# Patient Record
Sex: MALE | Race: WHITE
[De-identification: names, ages, dates, MRNs, and addresses within clinical notes are randomized per-mention and may not be internally consistent; named-entity substitution may affect disease eponyms.]

---

## 2017-08-16 ENCOUNTER — HOSPITAL ENCOUNTER (OUTPATIENT)
Dept: HOSPITAL 57 - BURLABSP | Age: 82
Discharge: HOME | End: 2017-08-16
Attending: FAMILY MEDICINE
Payer: MEDICARE

## 2017-08-16 DIAGNOSIS — L97.512: ICD-10-CM

## 2017-08-16 DIAGNOSIS — E11.621: Primary | ICD-10-CM

## 2017-08-16 PROCEDURE — 87186 SC STD MICRODIL/AGAR DIL: CPT

## 2017-08-16 PROCEDURE — 87205 SMEAR GRAM STAIN: CPT

## 2017-08-16 PROCEDURE — 87077 CULTURE AEROBIC IDENTIFY: CPT

## 2017-08-16 PROCEDURE — 87070 CULTURE OTHR SPECIMN AEROBIC: CPT

## 2017-09-12 ENCOUNTER — HOSPITAL ENCOUNTER (INPATIENT)
Dept: HOSPITAL 57 - BURMED | Age: 82
LOS: 16 days | Discharge: NURSING, REHAB OR LONG-TERM CARE | DRG: 690 | End: 2017-09-28
Attending: FAMILY MEDICINE | Admitting: FAMILY MEDICINE
Payer: MEDICARE

## 2017-09-12 VITALS — BODY MASS INDEX: 40.4 KG/M2

## 2017-09-12 DIAGNOSIS — I25.10: ICD-10-CM

## 2017-09-12 DIAGNOSIS — N39.0: Primary | ICD-10-CM

## 2017-09-12 DIAGNOSIS — I50.32: ICD-10-CM

## 2017-09-12 DIAGNOSIS — D64.9: ICD-10-CM

## 2017-09-12 DIAGNOSIS — E66.01: ICD-10-CM

## 2017-09-12 DIAGNOSIS — K21.9: ICD-10-CM

## 2017-09-12 DIAGNOSIS — L97.519: ICD-10-CM

## 2017-09-12 DIAGNOSIS — I11.0: ICD-10-CM

## 2017-09-12 DIAGNOSIS — G47.33: ICD-10-CM

## 2017-09-12 DIAGNOSIS — Z99.3: ICD-10-CM

## 2017-09-12 DIAGNOSIS — E11.9: ICD-10-CM

## 2017-09-12 DIAGNOSIS — M45.9: ICD-10-CM

## 2017-09-12 DIAGNOSIS — I48.0: ICD-10-CM

## 2017-09-12 DIAGNOSIS — N39.41: ICD-10-CM

## 2017-09-12 PROCEDURE — 97602 WOUND(S) CARE NON-SELECTIVE: CPT

## 2017-09-12 PROCEDURE — 81001 URINALYSIS AUTO W/SCOPE: CPT

## 2017-09-12 PROCEDURE — 36416 COLLJ CAPILLARY BLOOD SPEC: CPT

## 2017-09-12 PROCEDURE — 87086 URINE CULTURE/COLONY COUNT: CPT

## 2017-09-12 RX ADMIN — Medication SCH MG: at 22:21

## 2017-09-13 RX ADMIN — INSULIN DETEMIR SCH UNIT: 100 INJECTION, SOLUTION SUBCUTANEOUS at 10:52

## 2017-09-13 RX ADMIN — METFORMIN HYDROCHLORIDE SCH MG: 500 TABLET, EXTENDED RELEASE ORAL at 11:04

## 2017-09-13 RX ADMIN — INSULIN HUMAN PRN UNIT: 100 INJECTION, SOLUTION PARENTERAL at 18:26

## 2017-09-13 RX ADMIN — ASPIRIN SCH MG: 81 TABLET ORAL at 11:06

## 2017-09-13 RX ADMIN — Medication SCH MG: at 11:03

## 2017-09-13 RX ADMIN — INSULIN HUMAN PRN UNIT: 100 INJECTION, SOLUTION PARENTERAL at 13:08

## 2017-09-13 RX ADMIN — Medication SCH MG: at 20:40

## 2017-09-13 RX ADMIN — METFORMIN HYDROCHLORIDE SCH MG: 500 TABLET, EXTENDED RELEASE ORAL at 18:23

## 2017-09-14 RX ADMIN — Medication SCH MG: at 21:47

## 2017-09-14 RX ADMIN — METFORMIN HYDROCHLORIDE SCH MG: 500 TABLET, EXTENDED RELEASE ORAL at 08:46

## 2017-09-14 RX ADMIN — INSULIN DETEMIR SCH UNIT: 100 INJECTION, SOLUTION SUBCUTANEOUS at 09:07

## 2017-09-14 RX ADMIN — ASPIRIN SCH MG: 81 TABLET ORAL at 09:03

## 2017-09-14 RX ADMIN — METFORMIN HYDROCHLORIDE SCH MG: 500 TABLET, EXTENDED RELEASE ORAL at 21:45

## 2017-09-14 RX ADMIN — Medication SCH MG: at 08:45

## 2017-09-15 RX ADMIN — INSULIN DETEMIR SCH UNIT: 100 INJECTION, SOLUTION SUBCUTANEOUS at 10:45

## 2017-09-15 RX ADMIN — Medication SCH MG: at 10:47

## 2017-09-15 RX ADMIN — METFORMIN HYDROCHLORIDE SCH MG: 500 TABLET, EXTENDED RELEASE ORAL at 10:59

## 2017-09-15 RX ADMIN — Medication SCH MG: at 21:10

## 2017-09-15 RX ADMIN — METFORMIN HYDROCHLORIDE SCH MG: 500 TABLET, EXTENDED RELEASE ORAL at 18:11

## 2017-09-15 RX ADMIN — ASPIRIN SCH MG: 81 TABLET ORAL at 10:45

## 2017-09-16 RX ADMIN — METFORMIN HYDROCHLORIDE SCH MG: 500 TABLET, EXTENDED RELEASE ORAL at 16:33

## 2017-09-16 RX ADMIN — Medication SCH MG: at 21:09

## 2017-09-16 RX ADMIN — INSULIN HUMAN PRN UNIT: 100 INJECTION, SOLUTION PARENTERAL at 12:30

## 2017-09-16 RX ADMIN — METFORMIN HYDROCHLORIDE SCH MG: 500 TABLET, EXTENDED RELEASE ORAL at 08:37

## 2017-09-16 RX ADMIN — INSULIN DETEMIR SCH UNIT: 100 INJECTION, SOLUTION SUBCUTANEOUS at 08:42

## 2017-09-16 RX ADMIN — ASPIRIN SCH MG: 81 TABLET ORAL at 08:37

## 2017-09-16 RX ADMIN — Medication SCH MG: at 08:41

## 2017-09-17 RX ADMIN — ASPIRIN SCH MG: 81 TABLET ORAL at 09:27

## 2017-09-17 RX ADMIN — METFORMIN HYDROCHLORIDE SCH MG: 500 TABLET, EXTENDED RELEASE ORAL at 17:36

## 2017-09-17 RX ADMIN — Medication SCH MG: at 21:03

## 2017-09-17 RX ADMIN — Medication SCH MG: at 09:28

## 2017-09-17 RX ADMIN — METFORMIN HYDROCHLORIDE SCH MG: 500 TABLET, EXTENDED RELEASE ORAL at 09:25

## 2017-09-17 RX ADMIN — INSULIN DETEMIR SCH UNIT: 100 INJECTION, SOLUTION SUBCUTANEOUS at 09:15

## 2017-09-18 RX ADMIN — METFORMIN HYDROCHLORIDE SCH MG: 500 TABLET, EXTENDED RELEASE ORAL at 17:15

## 2017-09-18 RX ADMIN — METFORMIN HYDROCHLORIDE SCH MG: 500 TABLET, EXTENDED RELEASE ORAL at 08:21

## 2017-09-18 RX ADMIN — Medication SCH MG: at 21:21

## 2017-09-18 RX ADMIN — INSULIN DETEMIR SCH UNIT: 100 INJECTION, SOLUTION SUBCUTANEOUS at 08:32

## 2017-09-18 RX ADMIN — ASPIRIN SCH MG: 81 TABLET ORAL at 08:22

## 2017-09-18 RX ADMIN — Medication SCH MG: at 08:21

## 2017-09-19 RX ADMIN — Medication SCH MG: at 20:27

## 2017-09-19 RX ADMIN — METFORMIN HYDROCHLORIDE SCH MG: 500 TABLET, EXTENDED RELEASE ORAL at 08:14

## 2017-09-19 RX ADMIN — SENNOSIDES SCH TAB: 8.6 TABLET, FILM COATED ORAL at 20:29

## 2017-09-19 RX ADMIN — INSULIN DETEMIR SCH UNIT: 100 INJECTION, SOLUTION SUBCUTANEOUS at 08:26

## 2017-09-19 RX ADMIN — ASPIRIN SCH MG: 81 TABLET ORAL at 08:20

## 2017-09-19 RX ADMIN — Medication SCH MG: at 08:18

## 2017-09-19 RX ADMIN — METFORMIN HYDROCHLORIDE SCH MG: 500 TABLET, EXTENDED RELEASE ORAL at 17:59

## 2017-09-20 RX ADMIN — SENNOSIDES SCH TAB: 8.6 TABLET, FILM COATED ORAL at 21:14

## 2017-09-20 RX ADMIN — INSULIN HUMAN PRN UNIT: 100 INJECTION, SOLUTION PARENTERAL at 17:26

## 2017-09-20 RX ADMIN — Medication SCH MG: at 21:13

## 2017-09-20 RX ADMIN — INSULIN DETEMIR SCH UNIT: 100 INJECTION, SOLUTION SUBCUTANEOUS at 09:41

## 2017-09-20 RX ADMIN — METFORMIN HYDROCHLORIDE SCH MG: 500 TABLET, EXTENDED RELEASE ORAL at 09:19

## 2017-09-20 RX ADMIN — CARVEDILOL SCH EACH: 3.12 TABLET, FILM COATED ORAL at 09:36

## 2017-09-20 RX ADMIN — Medication SCH MG: at 09:21

## 2017-09-20 RX ADMIN — ASPIRIN SCH MG: 81 TABLET ORAL at 09:34

## 2017-09-20 RX ADMIN — METFORMIN HYDROCHLORIDE SCH MG: 500 TABLET, EXTENDED RELEASE ORAL at 17:26

## 2017-09-21 RX ADMIN — CARVEDILOL SCH EACH: 3.12 TABLET, FILM COATED ORAL at 10:02

## 2017-09-21 RX ADMIN — SENNOSIDES SCH TAB: 8.6 TABLET, FILM COATED ORAL at 21:00

## 2017-09-21 RX ADMIN — INSULIN HUMAN PRN UNIT: 100 INJECTION, SOLUTION PARENTERAL at 13:36

## 2017-09-21 RX ADMIN — Medication SCH MG: at 09:59

## 2017-09-21 RX ADMIN — METFORMIN HYDROCHLORIDE SCH MG: 500 TABLET, EXTENDED RELEASE ORAL at 10:02

## 2017-09-21 RX ADMIN — METFORMIN HYDROCHLORIDE SCH MG: 500 TABLET, EXTENDED RELEASE ORAL at 18:20

## 2017-09-21 RX ADMIN — ASPIRIN SCH MG: 81 TABLET ORAL at 10:02

## 2017-09-21 RX ADMIN — INSULIN DETEMIR SCH UNIT: 100 INJECTION, SOLUTION SUBCUTANEOUS at 08:34

## 2017-09-21 RX ADMIN — Medication SCH MG: at 20:59

## 2017-09-22 LAB
BACTERIA UR QL AUTO: (no result) HPF
SP GR UR STRIP: 1.01 (ref 1–1.03)
UNIDENT CRYS #/AREA URNS HPF: (no result) HPF
WBC UR QL AUTO: (no result) HPF (ref 0–3)

## 2017-09-22 RX ADMIN — ASPIRIN SCH MG: 81 TABLET ORAL at 08:35

## 2017-09-22 RX ADMIN — INSULIN DETEMIR SCH UNIT: 100 INJECTION, SOLUTION SUBCUTANEOUS at 08:46

## 2017-09-22 RX ADMIN — Medication SCH MG: at 08:35

## 2017-09-22 RX ADMIN — CARVEDILOL SCH EACH: 3.12 TABLET, FILM COATED ORAL at 08:39

## 2017-09-22 RX ADMIN — Medication SCH MG: at 21:05

## 2017-09-22 RX ADMIN — METFORMIN HYDROCHLORIDE SCH MG: 500 TABLET, EXTENDED RELEASE ORAL at 18:51

## 2017-09-22 RX ADMIN — SENNOSIDES SCH TAB: 8.6 TABLET, FILM COATED ORAL at 21:09

## 2017-09-22 RX ADMIN — METFORMIN HYDROCHLORIDE SCH MG: 500 TABLET, EXTENDED RELEASE ORAL at 08:35

## 2017-09-23 RX ADMIN — SENNOSIDES SCH TAB: 8.6 TABLET, FILM COATED ORAL at 20:22

## 2017-09-23 RX ADMIN — METFORMIN HYDROCHLORIDE SCH MG: 500 TABLET, EXTENDED RELEASE ORAL at 17:48

## 2017-09-23 RX ADMIN — METFORMIN HYDROCHLORIDE SCH MG: 500 TABLET, EXTENDED RELEASE ORAL at 09:15

## 2017-09-23 RX ADMIN — Medication SCH MG: at 20:22

## 2017-09-23 RX ADMIN — INSULIN DETEMIR SCH UNIT: 100 INJECTION, SOLUTION SUBCUTANEOUS at 09:26

## 2017-09-23 RX ADMIN — ASPIRIN SCH MG: 81 TABLET ORAL at 09:12

## 2017-09-23 RX ADMIN — Medication SCH MG: at 09:15

## 2017-09-23 RX ADMIN — INSULIN HUMAN PRN UNIT: 100 INJECTION, SOLUTION PARENTERAL at 13:07

## 2017-09-23 RX ADMIN — CARVEDILOL SCH EACH: 3.12 TABLET, FILM COATED ORAL at 09:21

## 2017-09-24 RX ADMIN — CARVEDILOL SCH EACH: 3.12 TABLET, FILM COATED ORAL at 09:19

## 2017-09-24 RX ADMIN — METFORMIN HYDROCHLORIDE SCH MG: 500 TABLET, EXTENDED RELEASE ORAL at 09:26

## 2017-09-24 RX ADMIN — SENNOSIDES SCH TAB: 8.6 TABLET, FILM COATED ORAL at 20:26

## 2017-09-24 RX ADMIN — ASPIRIN SCH MG: 81 TABLET ORAL at 09:19

## 2017-09-24 RX ADMIN — METFORMIN HYDROCHLORIDE SCH MG: 500 TABLET, EXTENDED RELEASE ORAL at 17:39

## 2017-09-24 RX ADMIN — INSULIN DETEMIR SCH UNIT: 100 INJECTION, SOLUTION SUBCUTANEOUS at 09:30

## 2017-09-24 RX ADMIN — Medication SCH MG: at 09:25

## 2017-09-24 RX ADMIN — Medication SCH MG: at 20:26

## 2017-09-25 RX ADMIN — METFORMIN HYDROCHLORIDE SCH MG: 500 TABLET, EXTENDED RELEASE ORAL at 08:45

## 2017-09-25 RX ADMIN — METFORMIN HYDROCHLORIDE SCH MG: 500 TABLET, EXTENDED RELEASE ORAL at 18:02

## 2017-09-25 RX ADMIN — ASPIRIN SCH MG: 81 TABLET ORAL at 08:43

## 2017-09-25 RX ADMIN — CARVEDILOL SCH EACH: 3.12 TABLET, FILM COATED ORAL at 08:48

## 2017-09-25 RX ADMIN — SENNOSIDES SCH TAB: 8.6 TABLET, FILM COATED ORAL at 21:15

## 2017-09-25 RX ADMIN — INSULIN HUMAN PRN UNIT: 100 INJECTION, SOLUTION PARENTERAL at 13:43

## 2017-09-25 RX ADMIN — INSULIN DETEMIR SCH UNIT: 100 INJECTION, SOLUTION SUBCUTANEOUS at 08:49

## 2017-09-25 RX ADMIN — Medication SCH MG: at 08:42

## 2017-09-25 RX ADMIN — Medication SCH MG: at 21:14

## 2017-09-25 NOTE — RAD
RIGHT FOOT 3 VIEWS:

 

DATE: 9/25/17.

 

COMPARISON: 

No prior films were available for comparison.

 

FINDINGS: 

There is some sclerosis of the 3rd, 4th, and 5th metatarsal shafts with some angulation of the 5th m
etatarsal shaft.  Bony overgrowth between the metatarsals is noted.  The findings are more likely du
e to old trauma than anything acute.  I would consider the study indeterminate for osteomyelitis.  T
he findings present could easily all be chronic.  It would probably take an MRI to see if there was 
any edema in any of these bones to be conclusive.  No gross acute fracture was seen.

 

IMPRESSION: 

Chronic deformity of the foot with findings indeterminate for osteomyelitis.

 

POS: HOME

## 2017-09-26 RX ADMIN — CARVEDILOL SCH EACH: 3.12 TABLET, FILM COATED ORAL at 08:49

## 2017-09-26 RX ADMIN — ASPIRIN SCH MG: 81 TABLET ORAL at 08:20

## 2017-09-26 RX ADMIN — METFORMIN HYDROCHLORIDE SCH MG: 500 TABLET, EXTENDED RELEASE ORAL at 17:35

## 2017-09-26 RX ADMIN — INSULIN DETEMIR SCH UNIT: 100 INJECTION, SOLUTION SUBCUTANEOUS at 08:26

## 2017-09-26 RX ADMIN — INSULIN HUMAN PRN UNIT: 100 INJECTION, SOLUTION PARENTERAL at 13:37

## 2017-09-26 RX ADMIN — SENNOSIDES SCH TAB: 8.6 TABLET, FILM COATED ORAL at 20:49

## 2017-09-26 RX ADMIN — Medication SCH MG: at 20:42

## 2017-09-26 RX ADMIN — Medication SCH MG: at 08:22

## 2017-09-26 RX ADMIN — METFORMIN HYDROCHLORIDE SCH MG: 500 TABLET, EXTENDED RELEASE ORAL at 08:24

## 2017-09-27 ENCOUNTER — HOSPITAL ENCOUNTER (OUTPATIENT)
Dept: HOSPITAL 92 - MRI | Age: 82
Discharge: HOME | End: 2017-09-27
Payer: MEDICARE

## 2017-09-27 DIAGNOSIS — L97.519: Primary | ICD-10-CM

## 2017-09-27 RX ADMIN — INSULIN HUMAN PRN UNIT: 100 INJECTION, SOLUTION PARENTERAL at 18:07

## 2017-09-27 RX ADMIN — ASPIRIN SCH MG: 81 TABLET ORAL at 11:23

## 2017-09-27 RX ADMIN — METFORMIN HYDROCHLORIDE SCH MG: 500 TABLET, EXTENDED RELEASE ORAL at 16:57

## 2017-09-27 RX ADMIN — Medication SCH MG: at 20:29

## 2017-09-27 RX ADMIN — METFORMIN HYDROCHLORIDE SCH MG: 500 TABLET, EXTENDED RELEASE ORAL at 11:21

## 2017-09-27 RX ADMIN — SENNOSIDES SCH TAB: 8.6 TABLET, FILM COATED ORAL at 20:30

## 2017-09-27 RX ADMIN — CARVEDILOL SCH EACH: 3.12 TABLET, FILM COATED ORAL at 11:23

## 2017-09-27 RX ADMIN — Medication SCH MG: at 11:09

## 2017-09-27 RX ADMIN — INSULIN DETEMIR SCH UNIT: 100 INJECTION, SOLUTION SUBCUTANEOUS at 11:25

## 2017-09-27 NOTE — MRI
MRI OF RIGHT FOOT PERFORMED WITHOUT CONTRAST ENHANCEMENT:

 

History: 

Chronic wound on right lateral foot. This area was marked. Evaluate for osteomyelitis. 

 

Comparison: Plain film examination, 9-25-17. 

 

FINDINGS: 

Some mild increased marrow signal change within the first metatarsal head. There are arthritic nation
es of this joint and there is plantar subluxation of the metatarsal head in relation to the proximal
 phalanx. Small amount of joint fluid. I would favor that these changes are more reactive changes. T
here is deformity to the metatarsal region. I do not see any signs of any marrow edema change that w
ould suggest osteomyelitis. There are fairly pronounced subcutaneous edema change. There is no soft 
tissue abscess demonstrated. 

 

IMPRESSION: 

No MR evidence for osteomyelitis. 

 

POS: [facility]

## 2017-09-27 NOTE — XMS
Clinical Summary

 Created on:2017



Patient:Martín Snider

Sex:Male

:1935

External Reference #:[id number]





Demographics







 Address  [address] 



   [place] [address]

 

 Home Phone  [phone]

 

 Mobile Phone  [phone]

 

 Email Address  [email or web address]

 

 Preferred Language  English

 

 Marital Status  Unknown

 

 Gnosticism Affiliation  Unknown

 

 Race  White

 

 Ethnic Group  Not  or 









Author







 Organization  [facility]

 

 Address  [address] [place] [address]

 

 Phone  [phone]









Support







 Name  Relationship  Address  Phone

 

 , Kat Snider  Emergency Contact  [address]   [phone]



     [place] [address]  









Care Team Providers







 Name  Role  Phone

 

 ,  Primary Care Provider  Unavailable









Allergies







 Active Allergy  Reactions  Severity  Noted Date  Comments

 

 Iodine And Iodide Containing Products      2015  Anaphylaxis

 

 Diazepam      2015  Hallucination







Current Medications

Not on file



Active Problems

Not on file



Social History







 Tobacco Use  Types  Packs/Day  Years Used  Date

 

 Never Assessed        









 Sex Assigned at Birth  Date Recorded

 

 Not on file  







Last Filed Vital Signs







 Vital Sign  Reading  Time Taken

 

 Blood Pressure  -  -

 

 Pulse  -  -

 

 Temperature  -  -

 

 Respiratory Rate  -  -

 

 Oxygen Saturation  -  -

 

 Inhaled Oxygen Concentration  -  -

 

 Weight  146.1 kg (322 lb)  2015 12:00 PM CDT

 

 Height  188 cm (6' 2")  2015 12:00 PM CDT

 

 Body Mass Index  41.34  2015 12:00 PM CDT







Plan of Treatment

Not on file



Results

Not on filefrom Last 3 Months

## 2017-09-27 NOTE — XMS
Clinical Summary

 Created on:2017



Patient:Martín Snider

Sex:Male

:1935

External Reference #:[id number]





Demographics







 Address  [address] 



   [place] [address]

 

 Home Phone  [phone]

 

 Mobile Phone  [phone]

 

 Email Address  [email or web address]

 

 Preferred Language  English

 

 Marital Status  Unknown

 

 Christianity Affiliation  Unknown

 

 Race  White

 

 Ethnic Group  Not  or 









Author







 Organization  [facility]

 

 Address  [address] [place] [address]

 

 Phone  [phone]









Support







 Name  Relationship  Address  Phone

 

 , Kat Snider  Emergency Contact  [address]   [phone]



     [place] [address]  









Care Team Providers







 Name  Role  Phone

 

 ,  Primary Care Provider  Unavailable









Allergies







 Active Allergy  Reactions  Severity  Noted Date  Comments

 

 Iodine And Iodide Containing Products      2015  Anaphylaxis

 

 Diazepam      2015  Hallucination







Current Medications

Not on file



Active Problems

Not on file



Social History







 Tobacco Use  Types  Packs/Day  Years Used  Date

 

 Never Assessed        









 Sex Assigned at Birth  Date Recorded

 

 Not on file  







Last Filed Vital Signs







 Vital Sign  Reading  Time Taken

 

 Blood Pressure  -  -

 

 Pulse  -  -

 

 Temperature  -  -

 

 Respiratory Rate  -  -

 

 Oxygen Saturation  -  -

 

 Inhaled Oxygen Concentration  -  -

 

 Weight  146.1 kg (322 lb)  2015 12:00 PM CDT

 

 Height  188 cm (6' 2")  2015 12:00 PM CDT

 

 Body Mass Index  41.34  2015 12:00 PM CDT







Plan of Treatment

Not on file



Results

Not on filefrom Last 3 Months

## 2017-09-28 VITALS — DIASTOLIC BLOOD PRESSURE: 61 MMHG | TEMPERATURE: 98 F | SYSTOLIC BLOOD PRESSURE: 146 MMHG

## 2017-09-28 RX ADMIN — INSULIN DETEMIR SCH UNIT: 100 INJECTION, SOLUTION SUBCUTANEOUS at 10:09

## 2017-09-28 RX ADMIN — Medication SCH MG: at 09:56

## 2017-09-28 RX ADMIN — METFORMIN HYDROCHLORIDE SCH MG: 500 TABLET, EXTENDED RELEASE ORAL at 09:59

## 2017-09-28 RX ADMIN — ASPIRIN SCH MG: 81 TABLET ORAL at 10:07

## 2017-09-29 NOTE — DIS
DATE OF ADMISSION:  09/12/2017

 

DATE OF DISCHARGE:  09/28/2017

 

ADMISSION DIAGNOSES:  Generalized weakness, urinary tract infection, chronic 
ulcer of the right foot



SECONDARY DIAGNOSES: paroxysmal atrial fibrillation, morbid obesity, 
obstructive sleep apnea, hypertension, type 2 diabetes mellitus, chronic 
diastolic heart failure, coronary artery disease, chronic anemia, 
gastroesophageal reflux disease, urge incontinence.

 

PROCEDURES:  On 09/25/2017, foot x-ray showed chronic deformity of the foot 
with findings indeterminate for osteomyelitis.  On 09/27/2017, MRI of the right 
foot showed no MRI evidence for osteomyelitis.

 

HOSPITAL COURSE:  An 82-year-old male with numerous chronic medical conditions 
transitioned for skilled care s/p admission at [facility], where 
he was admitted from 09/08/2017 to 09/12/2017 for complaints of significant 
worsening knee pain status post fall at home, preventing his ability to perform 
transfers; the patient has ankylosing spondylitis and is largely wheelchair 
bound and secondary to his morbid obesity and worsening knee pain he was unable 
to transition at his home setting.  He was evaluated by Orthopedics who 
declined an intra-articular steroid injection secondary to his chronic ulcer of 
the right foot and underlying type 2 diabetes mellitus.  He was subsequently 
found to have a urinary tract infection, which was treated initially with 
Rocephin and ultimately discharged to our facility with a 7-day course of 
Macrobid, which he did successfully complete.  Follow up urine culture was 
negative after completion of antibiotics.  The patient did participate with PT, 
OT, and did show a limited capacity to where they advised the patient against 
weightbearing activity secondary to significant fall risk.  It was advised that 
he have a free-standing lift set up in his home setting to be able to transfer 
to his wheelchair; however, he does not currently have this so he was advised 
that he transition to nursing home until properly equipped. Secondary to his 
lack of ability to participate with PT further and not meeting the standard for 
further skilled care at [facility] he will be discharged.  During his 
stay, he did receive Wound Care in regards to his chronic right foot ulcer.  It 
was shown that he did not have osteomyelitis involving this area.  At this time
, he has remained hemodynamically stable and is currently in his baseline 
condition and able to transition to [facility].

 

DISPOSITION:  The patient will be discharged to [facility] for 
further care until his free-standing lift can be properly set up at his home 
setting.

 

DISCHARGE MEDICATIONS:  New medication is finasteride 5 mg p.o. daily.  He will 
resume his usual medications otherwise, which include vitamin C 1 gram p.o. 
b.i.d., aspirin 81 mg p.o. daily, Lipitor 80 mg p.o. at bedtime, Coreg 6.25 mg 
p.o. b.i.d., vitamin B12 1000 mcg monthly, Dexilant 60 mg p.o. at bedtime, 
Multaq 400 mg p.o. b.i.d., ferrous sulfate 325 mg p.o. b.i.d., Lasix 40 mg p.o. 
daily, gabapentin 900 mg p.o. b.i.d., Norco 5 one to two tablets q.6 hours 
p.r.n., Lantus 30 units subcutaneously daily, Humalog insulin per sliding scale
, BiDil 1 tablet b.i.d., ranitidine 300 mg p.o. daily, Hytrin 5 mg p.o. daily, 
fentanyl patch 25 mcg q.72 h., Glucotrol 10 mg p.o. daily, Metformin 1 gram 
p.o. b.i.d.

 

MTDD

## 2017-10-06 ENCOUNTER — HOSPITAL ENCOUNTER (INPATIENT)
Dept: HOSPITAL 92 - ERS | Age: 82
LOS: 6 days | Discharge: HOME HEALTH | DRG: 260 | End: 2017-10-12
Attending: HOSPITALIST | Admitting: HOSPITALIST
Payer: MEDICARE

## 2017-10-06 VITALS — BODY MASS INDEX: 40.8 KG/M2

## 2017-10-06 DIAGNOSIS — R15.9: ICD-10-CM

## 2017-10-06 DIAGNOSIS — E11.22: ICD-10-CM

## 2017-10-06 DIAGNOSIS — E78.5: ICD-10-CM

## 2017-10-06 DIAGNOSIS — D64.9: ICD-10-CM

## 2017-10-06 DIAGNOSIS — R32: ICD-10-CM

## 2017-10-06 DIAGNOSIS — K21.9: ICD-10-CM

## 2017-10-06 DIAGNOSIS — L97.519: ICD-10-CM

## 2017-10-06 DIAGNOSIS — Z95.5: ICD-10-CM

## 2017-10-06 DIAGNOSIS — I48.0: ICD-10-CM

## 2017-10-06 DIAGNOSIS — I24.8: ICD-10-CM

## 2017-10-06 DIAGNOSIS — G47.33: ICD-10-CM

## 2017-10-06 DIAGNOSIS — E66.01: ICD-10-CM

## 2017-10-06 DIAGNOSIS — I25.10: ICD-10-CM

## 2017-10-06 DIAGNOSIS — I13.0: Primary | ICD-10-CM

## 2017-10-06 DIAGNOSIS — M45.9: ICD-10-CM

## 2017-10-06 DIAGNOSIS — N18.4: ICD-10-CM

## 2017-10-06 DIAGNOSIS — I50.33: ICD-10-CM

## 2017-10-06 DIAGNOSIS — E87.0: ICD-10-CM

## 2017-10-06 DIAGNOSIS — I47.2: ICD-10-CM

## 2017-10-06 LAB
ALP SERPL-CCNC: 77 U/L (ref 40–150)
ALT SERPL W P-5'-P-CCNC: 30 U/L (ref 8–55)
ANION GAP SERPL CALC-SCNC: 15 MMOL/L (ref 10–20)
AST SERPL-CCNC: 28 U/L (ref 5–34)
BASOPHILS # BLD AUTO: 0 THOU/UL (ref 0–0.2)
BASOPHILS NFR BLD AUTO: 0.4 % (ref 0–1)
BILIRUB SERPL-MCNC: 1.1 MG/DL (ref 0.2–1.2)
BUN SERPL-MCNC: 27 MG/DL (ref 8.4–25.7)
CALCIUM SERPL-MCNC: 8.7 MG/DL (ref 7.8–10.44)
CHLORIDE SERPL-SCNC: 106 MMOL/L (ref 98–107)
CK SERPL-CCNC: 62 U/L (ref 30–200)
CO2 SERPL-SCNC: 30 MMOL/L (ref 23–31)
CREAT CL PREDICTED SERPL C-G-VRATE: 0 ML/MIN (ref 70–130)
EOSINOPHIL # BLD AUTO: 0 THOU/UL (ref 0–0.7)
EOSINOPHIL NFR BLD AUTO: 0.3 % (ref 0–10)
GLOBULIN SER CALC-MCNC: 2.7 G/DL (ref 2.4–3.5)
HCT VFR BLD CALC: 33.5 % (ref 42–52)
LYMPHOCYTES # BLD: 1 THOU/UL (ref 1.2–3.4)
LYMPHOCYTES NFR BLD AUTO: 14 % (ref 21–51)
MONOCYTES # BLD AUTO: 0.7 THOU/UL (ref 0.11–0.59)
MONOCYTES NFR BLD AUTO: 9.6 % (ref 0–10)
NEUTROPHILS # BLD AUTO: 5.6 THOU/UL (ref 1.4–6.5)
RBC # BLD AUTO: 3.39 MILL/UL (ref 4.7–6.1)
TROPONIN I SERPL DL<=0.01 NG/ML-MCNC: 0.08 NG/ML (ref ?–0.03)
TROPONIN I SERPL DL<=0.01 NG/ML-MCNC: 0.09 NG/ML (ref ?–0.03)
WBC # BLD AUTO: 7.4 THOU/UL (ref 4.8–10.8)

## 2017-10-06 PROCEDURE — 71010: CPT

## 2017-10-06 PROCEDURE — 80069 RENAL FUNCTION PANEL: CPT

## 2017-10-06 PROCEDURE — 93798 PHYS/QHP OP CAR RHAB W/ECG: CPT

## 2017-10-06 PROCEDURE — A4216 STERILE WATER/SALINE, 10 ML: HCPCS

## 2017-10-06 PROCEDURE — 93880 EXTRACRANIAL BILAT STUDY: CPT

## 2017-10-06 PROCEDURE — 83735 ASSAY OF MAGNESIUM: CPT

## 2017-10-06 PROCEDURE — 36416 COLLJ CAPILLARY BLOOD SPEC: CPT

## 2017-10-06 PROCEDURE — 84484 ASSAY OF TROPONIN QUANT: CPT

## 2017-10-06 PROCEDURE — 70450 CT HEAD/BRAIN W/O DYE: CPT

## 2017-10-06 PROCEDURE — 93005 ELECTROCARDIOGRAM TRACING: CPT

## 2017-10-06 PROCEDURE — 93306 TTE W/DOPPLER COMPLETE: CPT

## 2017-10-06 PROCEDURE — 96374 THER/PROPH/DIAG INJ IV PUSH: CPT

## 2017-10-06 PROCEDURE — 80053 COMPREHEN METABOLIC PANEL: CPT

## 2017-10-06 PROCEDURE — 33282: CPT

## 2017-10-06 PROCEDURE — 81001 URINALYSIS AUTO W/SCOPE: CPT

## 2017-10-06 PROCEDURE — 80048 BASIC METABOLIC PNL TOTAL CA: CPT

## 2017-10-06 PROCEDURE — 36415 COLL VENOUS BLD VENIPUNCTURE: CPT

## 2017-10-06 PROCEDURE — 84100 ASSAY OF PHOSPHORUS: CPT

## 2017-10-06 PROCEDURE — 93970 EXTREMITY STUDY: CPT

## 2017-10-06 PROCEDURE — C1764 EVENT RECORDER, CARDIAC: HCPCS

## 2017-10-06 PROCEDURE — 83880 ASSAY OF NATRIURETIC PEPTIDE: CPT

## 2017-10-06 PROCEDURE — 94760 N-INVAS EAR/PLS OXIMETRY 1: CPT

## 2017-10-06 PROCEDURE — 85025 COMPLETE CBC W/AUTO DIFF WBC: CPT

## 2017-10-06 PROCEDURE — 82553 CREATINE MB FRACTION: CPT

## 2017-10-06 NOTE — RAD
PORTABLE CHEST ONE VIEW

10/6/17 at 8:54 p.m.

 

HISTORY: 

Dyspnea. 

 

FINDINGS:  

Comparison made to exam dated 4/27/16. 

 

The heart size is normal. The lungs are expanded without focal areas of consolidation, pneumothorax 
or pleural effusions. Spinal hardware is again seen.

 

IMPRESSION:  

No radiographic evidence of acute cardiopulmonary process. 

 

POS: [facility]

## 2017-10-06 NOTE — PDOC.EVN
Event Note





- Event Note


Event Note: 





[id number]


h&p dictated


1. Abnormal cardiac enzymes


2. ACute on chronic systolicchf


3. Syncope


4. H/O HTN


5. H/O Ankylosing spondylitis





plan:


see orders

## 2017-10-06 NOTE — CT
CT BRAIN WITHOUT CONTRAST 

10/6/17

 

HISTORY: 

Altered mental status. 

 

FINDINGS:  

Comparison is made with exam of 7/2/16.

 

Changes of cortical atrophy are again seen. Ventricular system is prominent but stable. No evidence 
of acute infarct, hemorrhage, midline shift or abnormal extra-axial fluid collections are noted. Pro
minent dural based calcifications are again seen. The bony calvarium is intact. The visualized paran
elysia sinuses and mastoid air cells are well aerated. A tiny old lacunar infarct is seen in the left 
thalamus.

 

IMPRESSION:  

No CT evidence of acute intracranial process.

 

POS: [facility]

## 2017-10-07 LAB
HYALINE CASTS #/AREA URNS LPF: (no result) LPF
RBC UR QL AUTO: (no result) HPF (ref 0–3)
TROPONIN I SERPL DL<=0.01 NG/ML-MCNC: 0.1 NG/ML (ref ?–0.03)
WBC UR QL AUTO: (no result) HPF (ref 0–3)

## 2017-10-07 RX ADMIN — INSULIN LISPRO PRN UNITS: 100 INJECTION, SOLUTION INTRAVENOUS; SUBCUTANEOUS at 16:55

## 2017-10-07 NOTE — PDOC.PN
- Subjective


Encounter Start Date: 10/07/17


Encounter Start Time: 13:10





doing well


c/o pain but says will try not using fentanyl patch


no f/c


no sob/cp





- Objective


MAR Reviewed: Yes


Vital Signs & Weight: 


 Vital Signs (12 hours)











  Temp Pulse Resp BP BP Pulse Ox


 


 10/07/17 13:05  97.7 F  70  16   143/66 H  95


 


 10/07/17 09:05      161/68 H 


 


 10/07/17 08:56   74    


 


 10/07/17 08:54     183/77 H  


 


 10/07/17 08:45  97.6 F   16    99


 


 10/07/17 08:07  97.6 F  74  16   


 


 10/07/17 04:00  97.7 F  56 L  12   134/60  95








 Weight











Weight                         301 lb 8 oz














I&O: 


 











 10/06/17 10/07/17 10/08/17





 06:59 06:59 06:59


 


Intake Total  240 


 


Balance  240 











Result Diagrams: 


 10/06/17 20:07





 10/06/17 20:07


Additional Labs: 


 Accuchecks











  10/07/17





  05:38


 


POC Glucose  112 H














Phys Exam





- Physical Examination


Constitutional: NAD


HEENT: PERRLA


Neck: no JVD


coarse bs.some basilar rales


Cardiovascular: no significant murmur


Gastrointestinal: soft, non-tender


Musculoskeletal: pulses present


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


Neurological: moves all 4 limbs


Psychiatric: A&O x 3





Dx/Plan


(1) Syncope and collapse


Code(s): R55 - SYNCOPE AND COLLAPSE   Status: Acute   





(2) Ankylosing spondylitis


Code(s): M45.9 - ANKYLOSING SPONDYLITIS OF UNSPECIFIED SITES IN SPINE   Status: 

Acute   





(3) Acute on chronic diastolic CHF (congestive heart failure)


Code(s): I50.33 - ACUTE ON CHRONIC DIASTOLIC (CONGESTIVE) HEART FAILURE   Status

: Acute   





(4) Elevated troponin


Code(s): R74.8 - ABNORMAL LEVELS OF OTHER SERUM ENZYMES   Status: Acute   





(5) Weakness generalized


Code(s): R53.1 - WEAKNESS   Status: Acute   





(6) CAD (coronary artery disease)


Code(s): I25.10 - ATHSCL HEART DISEASE OF NATIVE CORONARY ARTERY W/O ANG PCTRS 

  Status: Chronic   





(7) Chronic anemia


Code(s): D64.9 - ANEMIA, UNSPECIFIED   Status: Chronic   





(8) DM type 2 (diabetes mellitus, type 2)


Status: Chronic   





(9) GERD (gastroesophageal reflux disease)


Code(s): K21.9 - GASTRO-ESOPHAGEAL REFLUX DISEASE WITHOUT ESOPHAGITIS   Status: 

Chronic   





(10) HTN (hypertension)


Code(s): I10 - ESSENTIAL (PRIMARY) HYPERTENSION   Status: Chronic   





(11) Morbid obesity with BMI of 40.0-44.9, adult


Code(s): E66.01 - MORBID (SEVERE) OBESITY DUE TO EXCESS CALORIES; Z68.41 - BODY 

MASS INDEX (BMI) 40.0-44.9, ADULT   Status: Chronic   





(12) GARCÍA (obstructive sleep apnea)


Code(s): G47.33 - OBSTRUCTIVE SLEEP APNEA (ADULT) (PEDIATRIC)   Status: Chronic

   





(13) Ulcer of right foot


Code(s): L97.519 - NON-PRS CHRONIC ULCER OTH PRT RIGHT FOOT W UNSP SEVERITY   

Status: Chronic   





- Plan





* us carotid


* wound care


* lasix


* f/u card plan


* neuro consult


* stop fentanyl


* pt/ot 


* case mx for opt wound care

## 2017-10-07 NOTE — ULT
CAROTID DUPLEX SONOGRAM:

 

HISTORY: 

Altered mental status.  TIA.  Vascular disease.

 

FINDINGS: 

 

RIGHT:

Scattered plaque is present.  Color and spectral Doppler evaluation, peak systolic velocity of 109 c
m/s, and IC to CC ratio of 0.9 suggests no hemodynamically significant stenosis within the extracran
ial right ICA.  Antegrade flow is present within the vertebral artery.  Elevated velocity of 210 cm/
s is documented within the external carotid artery.

 

LEFT:

Scattered plaque is present.  Color and spectral Doppler evaluation, peak systolic velocity of 150 c
m/s, and IC to CC ratio of 1.2 suggests no hemodynamically significant stenosis within the extracran
ial left ICA.  Antegrade flow is present within the vertebral artery.

 

IMPRESSION: 

Atherosclerosis.  No sonographic evidence of significant extracranial internal carotid artery stenos
is.

 

POS: [facility]

## 2017-10-07 NOTE — EKG
Test Reason : CHF

Blood Pressure : ***/*** mmHG

Vent. Rate : 058 BPM     Atrial Rate : 058 BPM

   P-R Int : 216 ms          QRS Dur : 090 ms

    QT Int : 482 ms       P-R-T Axes : 003 089 085 degrees

   QTc Int : 473 ms

 

Sinus bradycardia with 1st degree A-V block

Low voltage QRS

Borderline ECG

 

Confirmed by YAMILETH TREJO, JEANETTE GARCIA (9),  ANAI WHITTINGTON ([id number]) on 10/7/2017 1:56:11 PM

 

Referred By:             Confirmed By:JEANETTE CARSON MD

## 2017-10-07 NOTE — ULT
BILATERAL LOWER EXTREMITY VENOUS DUPLEX SONOGRAM:

 

HISTORY: 

Bilateral leg pain and edema.

 

FINDINGS: 

Each common femoral vein and greater saphenous junction were evaluated along with each femoral, deep
 femoral, popliteal, and posterior tibial vein.  There is good color and spectral Doppler flow, comp
ression, and augmentation.

 

IMPRESSION: 

No sonographic evidence of deep vein thrombosis within either lower extremity.

 

POS: [facility]

## 2017-10-07 NOTE — ADD-CON
DATE OF CONSULTATION:  10/07/2017

 

INDICATION FOR CONSULTATION:  Congestive heart failure.

 

HISTORY OF PRESENT ILLNESS:  This is a very elderly gentleman who has had a long history in the past
 of coronary artery disease and heart disease.  I am uncertain about whether he has had bypass surge
ry, but he has had stent placements in the past.  He had been in rehab recently and then had been di
scharged to home.  While at home, he developed more lower extremity edema and shortness of breath an
d became somewhat lethargic and was brought to the emergency room, was then readmitted to the hospit
al.  He has been found to have congestive heart failure.  He had been on pain medications which may 
have been due to some of his lethargy, but otherwise, he denied any chest pain.  He always has some 
shortness of breath.  He has morbid obesity.  He also has ankylosing spondylitis as well as this low
er extremity edema and has had I believe surgery for his spine as well as some sternal surgery.  He 
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  I think 
he also had surgery, but he really is still not functional.  He did have an ulceration on the foot a
nd is unable to ambulate with the foot.  He is unable to get around due to the balance problem and c
ertainly, if he is unable to walk on this foot, then he is even more in disability, has pretty much 
been bedridden at home.  He does have multiple problems as noted previously by the dictations by my 
nurse practitioner, Renetta.

 

For his past medical history, social history, review of systems, medications, allergies, family hist
ory, please refer to the notes already dictated.

 

PHYSICAL EXAMINATION:

GENERAL:  At this time he is alert, he is oriented.

HEENT:  He does drift off to sleep occasionally during the examination or during the discussions, bu
t otherwise, HEENT exam was unremarkable.

CHEST:  He has a few basilar rales.

CARDIOVASCULAR:  Shows a regular rhythm at this time.  Heart sounds are somewhat distant.  I did not
 hear any significant murmurs.

ABDOMEN:  Shows morbid obesity.

EXTREMITIES:  Showed discoloration of the lower extremities and also evidence of edema of the lower 
extremities, the left being more so than the right.  Pedal pulses were present.

NEUROLOGIC:  Difficult to assess as the patient is unable to get out of the bed.

 

IMPRESSION:

1.  Congestive heart failure exacerbation.  He has been improving with the use of IV diuretics.  I mendoza lopez he has a diaper and will be somewhat difficult to determine the exact I's and O's, but he say
s he has been having increased urination since being in the hospital.  At this time, I would continu
e his diuretics.  Also, he does have a significant amount of pain and will need to have some control
 of the pain.  He seems to be tolerating it quite well with what is being ordered thus far, would ne
ed to be obviously very careful with fentanyl patches in this gentleman and with the addition of his
 other pain medications.

2.  History of hyperlipidemia.  He will remain on his atorvastatin.

3.  Diastolic dysfunction.  He recently had an echocardiogram in the office earlier this year, which
 showed an ejection fraction of around 50%.  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
farction in the past with a stress test, I believe in [place], which showed evidence of an inferior 
scar with french-infarct ischemia.  At this time, I would agree with the assessment and plan by the [facility] [place].  We have discussed this patient in detail and we will continue to follow him caref
ully with you until Dr. Landers sees the patient when he returns.  He also had an echocardiogram wh
ich will be reviewed when that becomes available.

## 2017-10-07 NOTE — HP
DATE OF ADMISSION:  10/06/2017

 

CHIEF COMPLAINT:  Dyspnea, lethargy and unresponsive.

 

HISTORY OF PRESENT ILLNESS:  The patient is an 82-year-old male with past medical history of ankylos
ing spondylitis, chronic pain, coronary artery disease, hypertension, CHF, hyperlipidemia, who was b
rought to the ER patient was found unresponsive.  History obtained from the patient and patient's da
ughter and the patient's wife also.  According to them, the patient was having shortness of breath a
nd chest congestion and fluid overload for the past few days.  The patient was taking Lasix at home,
 but the patient was having more short of breath today.  Today when patient wife was saw him this mo
rning, the patient was slightly irritable and not indeed the whole day.  The patient wife went to [facility] on him an hour later and the patient was found unresponsive and gasping for air, so EMS was call
ed.  Upon EMS arrival, the patient was placed on oxygen, breathing status improved later on, so the 
patient was brought to the ER.  The patient complains of some bilateral lower extremity swelling and
 complains of some dyspnea also.  Denies any chest pain, denies any palpitations, denies any fever, 
denies any chills.

 

PAST MEDICAL HISTORY:  Chronic right foot wound causing ankylosing spondylitis, coronary artery dise
ase, hypertension, CHF, hyperlipidemia.

 

PAST SURGICAL HISTORY:  Stents.

 

SOCIAL HISTORY:  No smoking, no alcohol, no drugs.

 

MEDICATIONS:  Reviewed.

 

FAMILY HISTORY:  Positive for heart problems.

 

REVIEW OF SYSTEMS:  Constitutional:  Denies any fever, denies any chills.  Eyes:  Denies any vision 
problems.  Ears:  Denies any hearing loss.  Neck:  Denies any neck pain.  Cardiovascular system:  De
nies any chest pain.  Respiratory system:  Positive for dyspnea.  Positive for cough.  Cranial nerve
 system:  Denies syncope, denies lightheadedness.  Psychiatric:  Denies depression or anxiety.  Inte
gumentary:  Denies any rash.  Genitourinary:  Denies dysuria.  Musculoskeletal:  Positive for right 
ankle wound.  All other review of systems are reviewed and are negative.

 

PHYSICAL EXAMINATION:

CONSTITUTIONAL/VITAL SIGNS:  At the time of H\T\P performed, blood pressure is 121/95, pulse oximetr
y is 95%, heart rate 60.

GENERAL APPEARANCE:  The patient appears tired.

HEENT:  Anterior nares patent.  Oral cavity:  Poor dentition.

NECK:  Supple, no JVD.

CARDIOVASCULAR:  S1, S2 present.  Regular rate and rhythm.  No murmurs, no rubs, no gallops.

RESPIRATORY:  Positive for crackles, diminished breath sounds.  No rhonchi, no wheezing.  Breath vinay
nds present bilaterally.

GASTROINTESTINAL:  Abdomen is soft, nontender, no guarding, no organomegaly, no masses felt.

INTEGUMENTARY:  No obvious rashes seen.

PSYCHIATRIC:  Mood is appropriate at this time.

CRANIAL NERVE SYSTEM:  Cranial nerves intact.  Follows command.  Strength intact, sensory intact.

MUSCULOSKELETAL:  Right ankle positive for chronic wound.  Positive for dressing present.

 

ASSESSMENT AND PLAN:  The patient is an 82-year-old male admitted secondary to syncope.

1.  Syncope, might be secondary to the pain medication.  The patient is on fentanyl patch and also N
orco, might be some drug overdose.  We will monitor the patient closely.  CT head, no acute disease.

2.  Acute on chronic congestive heart failure, possibly systolic.  We will go ahead and start the pa
tient on IV Lasix.  We will consult Cardiology to evaluate the patient.  We will check 2D echo.

3.  Abdominal cardiac enzymes, might be secondary to some demand ischemia itself.  Recheck cardiac e
nzymes.  If repeat troponin worsens, we will go ahead and give a dose of Lovenox.  EKG, no acute ST 
changes at this time.

4.  History of coronary artery disease.  Continue home medication.

5.  History of hypertension.  Continue blood pressure medications.

6.  Ankylosing spondylitis.  Plan to hold off fentanyl patch.  We will do p.r.n., Norco if needed fo
r severe pain.

7.  Chronic right ankle wound.  Plan to consult Wound Care to evaluate the patient.

 

The case was discussed in detail with the patient and patient's daughter at the bedside.

## 2017-10-08 LAB
ANION GAP SERPL CALC-SCNC: 12 MMOL/L (ref 10–20)
BASOPHILS # BLD AUTO: 0 THOU/UL (ref 0–0.2)
BASOPHILS NFR BLD AUTO: 0 % (ref 0–1)
BUN SERPL-MCNC: 38 MG/DL (ref 8.4–25.7)
BUN/CREAT SERPL: 30.89
CALCIUM SERPL-MCNC: 8.7 MG/DL (ref 7.8–10.44)
CHLORIDE SERPL-SCNC: 104 MMOL/L (ref 98–107)
CO2 SERPL-SCNC: 34 MMOL/L (ref 23–31)
CREAT CL PREDICTED SERPL C-G-VRATE: 91 ML/MIN (ref 70–130)
EOSINOPHIL # BLD AUTO: 0.2 THOU/UL (ref 0–0.7)
EOSINOPHIL NFR BLD AUTO: 4.3 % (ref 0–10)
HCT VFR BLD CALC: 32.5 % (ref 42–52)
LYMPHOCYTES # BLD: 1.8 THOU/UL (ref 1.2–3.4)
LYMPHOCYTES NFR BLD AUTO: 36 % (ref 21–51)
MONOCYTES # BLD AUTO: 0.4 THOU/UL (ref 0.11–0.59)
MONOCYTES NFR BLD AUTO: 7.9 % (ref 0–10)
NEUTROPHILS # BLD AUTO: 2.6 THOU/UL (ref 1.4–6.5)
RBC # BLD AUTO: 3.23 MILL/UL (ref 4.7–6.1)
WBC # BLD AUTO: 4.9 THOU/UL (ref 4.8–10.8)

## 2017-10-08 RX ADMIN — INSULIN LISPRO PRN UNITS: 100 INJECTION, SOLUTION INTRAVENOUS; SUBCUTANEOUS at 17:49

## 2017-10-08 RX ADMIN — INSULIN LISPRO PRN UNIT: 100 INJECTION, SOLUTION INTRAVENOUS; SUBCUTANEOUS at 21:12

## 2017-10-08 RX ADMIN — INSULIN LISPRO PRN UNITS: 100 INJECTION, SOLUTION INTRAVENOUS; SUBCUTANEOUS at 12:16

## 2017-10-08 NOTE — CON
This is Renetta Acevedo NP, dictating for Layne Carlos M.D.

 

CARDIOLOGY CONSULTATION REPORT

 

LOCATION:  Room #253.

 

PRIMARY CARE PHYSICIAN:  Porfirio Donaldson M.D.

 

PRIMARY CARDIOLOGIST:  Aman Landers M.D.

 

REFERRING PHYSICIAN:  Ulysses Atkins M.D.

 

REASON FOR CARDIOLOGY CONSULTATION:  Heart failure.

 

HISTORY OF PRESENT ILLNESS:  Mr. Snider is an 82-year-old  male 
with a significant medical history of ankylosing spondylosis, which affected to 
his spine, sternum, pelvis, ribs and neck; hypertension, diabetes type 2 and 
chronic right foot ulcer.  The patient was discharged from [facility] 
in 2017 and transferred to nursing home last week for 4 days for 
rehabilitation.  Then, the patient was discharged from nursing home to his home 
on 10/05/17.  On 10/06/17,  when the patient' wife went to check on the patient'
s status, the patient was not responsive to pain or sounds stimulation and the 
patient was breathing deep and heavy.  The patient's wife called the EMS.  When 
EMS arrived and put the patient on oxygen, the patient's symptoms improved.  
However, according to the patient's wife,the patient was still weak and still 
confusing at that time.  The patient was transferred to [facility] 
Emergency Department for further study, evaluation and treatment.  According to 
Emergency Department report, the patient was on the fentanyl patch and pain 
medication for pain treatment.  The patient reports that he has had shortness 
of breath and dyspnea on exertion for several days prior to this admission when 
he was in the nursing home.  During the initial Cardiology consult assessment, 
the patient denies any cardiac complaints, shortness of breath, dizziness, 
numbness, nausea or diaphoresis.

 

The patient underwent to cardiac catheterization and stent placement x3 in  
at [facility] in [place].  He was told that his cardiac status was complicated 
due to ankylosing spondylosis, so he had to have the procedure done in [place].
  The patient's last echocardiogram was on 2017, which revealed an 
ejection fraction of 50% to 55%, mild mitral valve regurgitation, tricuspid 
regurgitation, mild left ventricular dilation. and Grade I diastolic 
dysfunction.  A stress test in  at [facility] in [place] revealed 
abnormal myocardial infusion with severe medium sized fixed perfusion defect in 
the basal and the mid inferolateral wall of left ventricle.  However, due to 
history of ankylosing spondylosis, he was recommended to be on that medical 
treatment.  He has seen Dr. Dye for his chronic kidney disease stage 4 and he 
has been following up with Dr. Galindo for his urine incontinence.

 

PAST MEDICAL HISTORY:

1.  Ankylosing spondylosis of spine, sternum, now pelvis, ribs and a partial 
diffuse at the neck.

2.  Hypertension.

3.  Diabetes type 2.

4.  Hiatal hernia.

5.  Dyslipidemia.

6.  Obstructive sleep apnea.

7.  Gastroesophageal reflux disease.

8.  Chronic kidney disease stage 4.

9.  Right clubbed foot.

 

PAST SURGICAL HISTORY:

1.  T6-T7 spine fracture repair in .

2.  Left carpal tunnel surgery.

3.  Left foot surgery in .

4.  Pilonidal cyst.

5.  Cardiac catheterization and stent x3 in .

 

 

FAMILY HISTORY:  Significant for coronary artery disease in both paternal and 
maternal side.  His father had a history of multiple myeloma.  His mother 
 due to congestive heart failure.

 

SOCIAL HISTORY:  The patient is  and lives with his wife.  He has one 
child, living well.  He used to smoke.  He quit about 50 years ago.  He denies 
any alcohol or illicit drug abuse.

 

ALLERGIES:  He is allergic to SODIUM HYPOCHLORITE SOLUTION, DIAZEPAM, 
TRIMETHOPRIM, BACTRIM, IODINE and IODINE CONTRAST.

 

HOME MEDICATIONS:  Please see patient's records.

 

REVIEW OF SYSTEMS:  The following complete review of systems was negative, 
unless otherwise mentioned in the HPI or below.  Constitutional:  Sense of well 
being, ability to conduct usual activities.  Skin:  There is discoloration in 
the bilateral legs.  Eyes:  Wears glasses, but denied vision change, double 
vision, tearing, blind spots or pain.  HENT:  Headache, vertigo, lightheadedness
, nose bleeding, cold, obstruction, discharge, dental difficulty, gingival 
bleeding, denture, neck stiffness, pain, tenderness, mass in the thyroid or 
other areas.  Cardiovascular:  Precordial pain, substernal distress, 
palpitations, syncope, nocturnal dyspnea, cyanosis, heart murmur, claudication, 
varicosis.  Respiratory:  Pain, wheezing, stridor, cough, hemoptysis.  
Gastrointestinal:  Poor appetite, dysphagia, indigestion, abdominal pain, 
heartburn, nausea, vomiting, jaundice, constipation, diarrhea, abnormal stool 
or recent change in the bowel habit.  Genitourinary:  Urgency, frequency, 
dysuria, nocturia, hematuria, polyuria, oliguria.  Positive to urine 
incontinence.  Musculoskeletal:  Positive to limited motion due to the history 
of ankylosing spondylosis.  Neurologic:  Conversion, seizure, paralysis, tremor
, incoordination, difficulty with memory of speech, sensory or motion 
disturbance or muscular coordination.  PSYCHIATRIC:  Emotional problem, anxiety
, depression, previous psychiatric care, unusual perception, hallucination.

 

PHYSICAL EXAMINATION:

VITAL SIGNS:  Blood pressure 143/66, heart rate 70s, sinus rhythm, respiratory 
rate is 16, O2 sat 95% with 2 liters and temperature 97.7.

GENERAL:  A well-developed, well-nourished without any acute distress.

HEAD:  Normocephalic and atraumatic.

EYES:  Wears glasses.  Extraocular muscle movement is intact.

ENT:  Oral and nasal mucosa are moist without lesion.

NECK:  No JVD.  Neck is supple, but difficulty with range of movement.

LUNGS:  Diminished at the bases, but no wheezing, rales or rhonchi noted.

CARDIOVASCULAR:  Regular rate and rhythm.  Normal S1 and S2.  No S3, S4.  No 
significant murmur, hives, thrills, bruits or rubs noted.

EXTREMITIES:  2+ pulses in the bilateral dorsal pedis, posterior tibial.  
Carotid pulse present without bruit or thrill.  There is 2 or 3+ pitting edema 
in his right foot, but no edema in his left lower extremities.

ABDOMEN:  Soft and nontender or mass to palpate, but distended.  Bowel sounds 
are present.

MUSCULOSKELETAL:  Slow, but able to move all extremities.

SKIN:  There is a MediPort dressing on the right lateral foot and some 
discoloration on bilateral lower extremities, but no skin rash, lesion or 
bruise noted.

NEUROLOGIC:  The patient is alert and oriented x4, awake, normal affect and 
nonfocal.

PSYCHIATRIC:  Mood and affect are normal.

 

LABORATORY AND IMAGING DATA:  EKG:  A 12-lead EKG in the ER shows sinus 
bradycardia with a heart rate of 58 without ST segment change.  WBC 7.4, 
hemoglobin 10.8, hematocrit 33.5 and platelet 180.  Sodium 147, potassium 3.9, 
BUN 27, creatinine 0.85 and glucose 176.  BNP is 1523.  CK-MB 1.3.  Troponin 
0.080, 0.089 and 0.097.  Brain CT scan shows no evidence of acute intracranial 
process.  Chest x-ray revealed no acute cardiopulmonary process.  Bilateral 
carotid Doppler study revealed no sonographic evidence of significant carotid 
artery stenosis.  Venogram shows no evidence of deep venous thrombosis in the 
bilateral lower extremities.

 

ASSESSMENT AND PLAN:

1.  Syncope, possibly secondary to pain medication overdose.  The telemetry 
records have not showed any pause or abnormal rhythm.  We would like to 
continue to monitor on telemetry.  At this moment, all his pain medication are 
on hold.  

2.  Abnormal cardiac enzymes, possibly secondary to demand ischemia; however, 
due to the patient's cardiac status, we would like to continue to monitor and 
treat medically at this moment.

3.  Acute on chronic diastolic heart failure.  Echocardiogram in 2017 showed 
grade I diastolic dysfunction.  At this moment, he is stable with Lasix 40 mg 
IV push twice a day.  The patient's echocardiogram result at this admission is 
pending at this time.

4.  History of coronary artery disease with history of stents x3 in .  The 
patient's condition is stable.  At this time, we would like to continue to 
monitor on telemetry.

5.  Hypertension.  The patient's blood pressure is well controlled with current 
medication.

6.  Ankylosing spondylosis.  He is not on any pain medicine at this moment; His 
pain medication is on hold due to history of syncope.  However, the patient's 
pain level is stable at this time.

7.  Chronic kidney disease stage 4.  The patient's creatinine today was 0.85.

8.  Right foot ulcer.  Wound Care team consult is ordered by PCP.

9.  Dyslipidemia.  The patient is on statin medication.

10.  Diabetes type 2.  The patient on the a.c. and at the bedtime glucose check 
with insulin sliding scale, managed by primary care doctor.

11.  Paroxysmal atrial fibrillation.  On telemetry record, the patient remains 
in sinus rhythm with Multaq 400 mg twice a day.

12.  Obstructive sleep apnea.  There is no CPAP machine in the patient's room, 
maybe it is beneficial for him to have CPAP machine for sleep.

 

Thank you very much for allowing Cardiology Service to participate in the care 
of this patient.  We will follow along with the patient's care team and make 
further recommendations as appropriate.

 

YANIRA

## 2017-10-08 NOTE — PDOC.CTH
<Renetta Acevedo - Last Filed: 10/08/17 17:30>





Cardiology Progress Note





- Subjective





The pt was seen and examined.  No cardiac complaints.  Several episodes of V 

tach early AM today.  Asymptomatic.  





- Objective


 Vital Signs











  Temp Pulse Pulse Pulse Resp BP BP


 


 10/08/17 12:07  97.5 F L  72    20  


 


 10/08/17 10:13    69  71    170/73 H


 


 10/08/17 09:42   66     172/70 H 


 


 10/08/17 08:20  97.5 F L  72    20  


 


 10/08/17 08:19  97.7 F  66    16  














  BP BP Pulse Ox


 


 10/08/17 12:07   180/85 H  96


 


 10/08/17 10:13  179/76 H  


 


 10/08/17 09:42   


 


 10/08/17 08:20    95


 


 10/08/17 08:19   172/70 H  95








 











Admit Weight                   301 lb


 


Weight                         300 lb














 











 10/07/17 10/08/17 10/09/17





 06:59 06:59 06:59


 


Intake Total 240 1100 


 


Balance 240 1100 














- Physical Examination


General/Neuro: alert & oriented x3


Neck: no JVD present


Lungs: other: (Diminished in bases)


Heart: RRR


Abdomen: other: (distended)


Extremities: other: (2+ in Rt foot)





- Telemetry


Telemetry Rhythm: SR 





- Labs


Result Diagrams: 


 10/08/17 05:01





 10/08/17 05:01


 Troponin/CKMB











CK-MB (CK-2)  1.3 ng/mL (0-6.6)   10/06/17  20:07    


 


Troponin I  0.097 ng/mL (< 0.028)  H  10/07/17  02:37    














- Assessment/Plan





1. Syncope - stable; cont. monitor


2. Acute on chronic diastolic HF - 2D Echo on 10/07 showed EF 55-60% with mild 

MR and TR; stable; on Lasix until tomorrow.


3. CAD with Hx of Stent x3 in 2012 - s/p multiple episodes of Vtach today; EP 

consult


4. HTN - Stable with current medication


5. Ankylosing spondylitis - stable


6. CKD stage 4 - stable


7. Dyslipidemia - on Statin medication


8. Chronic ulcer of right foot - Wound Care consult 


9. DM type 2 - managed by PCP


10. Hx of Afib - Remains SR on tele with Multaq


11. Sleep Apnea 


MAR reviewed





Review of Systems





- Review of Systems


Constitutional: reports: no symptoms reported


EENTM: reports: no symptoms reported


Respiratory: reports: no symptoms reported


Cardiac (ROS): reports: no symptoms reported


ABD/GI: reports: no symptoms reported


: reports: no symptoms reported





<SIRIA Carlos - Last Filed: 10/08/17 22:28>





Cardiology Progress Note





- Objective


 Vital Signs











  Temp Pulse Resp BP BP Pulse Ox


 


 10/08/17 21:17     160/78 H  


 


 10/08/17 21:16   60   160/78 H  


 


 10/08/17 17:41  97 F L  60  20   181/74 H 


 


 10/08/17 12:07  97.5 F L  72  20   180/85 H  96








 











Admit Weight                   301 lb


 


Weight                         300 lb














 











 10/07/17 10/08/17 10/09/17





 06:59 06:59 06:59


 


Intake Total 240 1100 


 


Balance 240 1100 














- Labs


Result Diagrams: 


 10/08/17 05:01





 10/08/17 05:01


 Troponin/CKMB











CK-MB (CK-2)  1.3 ng/mL (0-6.6)   10/06/17  20:07    


 


Troponin I  0.097 ng/mL (< 0.028)  H  10/07/17  02:37    














- Assessment/Plan





Pt. seen and evaluated. i agree with the A/P by the NP

## 2017-10-08 NOTE — PDOC.PN
- Subjective


Encounter Start Date: 10/08/17


Encounter Start Time: 16:56





breathing better


no n/v


no cp/sob


still weak





- Objective


MAR Reviewed: Yes


Vital Signs & Weight: 


 Vital Signs (12 hours)











  Temp Pulse Pulse Pulse Resp BP BP


 


 10/08/17 12:07  97.5 F L  72    20  


 


 10/08/17 10:13    69  71    170/73 H


 


 10/08/17 09:42   66     172/70 H 


 


 10/08/17 08:20  97.5 F L  72    20  


 


 10/08/17 08:19  97.7 F  66    16  














  BP BP Pulse Ox


 


 10/08/17 12:07   180/85 H  96


 


 10/08/17 10:13  179/76 H  


 


 10/08/17 09:42   


 


 10/08/17 08:20    95


 


 10/08/17 08:19   172/70 H  95








 Weight











Admit Weight                   301 lb


 


Weight                         300 lb














I&O: 


 











 10/07/17 10/08/17 10/09/17





 06:59 06:59 06:59


 


Intake Total 240 1100 


 


Balance 240 1100 











Result Diagrams: 


 10/08/17 05:01





 10/08/17 05:01


Additional Labs: 


 Accuchecks











  10/08/17 10/08/17 10/07/17





  11:07 05:48 21:00


 


POC Glucose  302 H  91  183 H














Phys Exam





- Physical Examination


Constitutional: NAD


HEENT: moist MMs


Neck: no nodes


Respiratory: no wheezing


some bibasilar rales


Cardiovascular: RRR


Gastrointestinal: non-tender


Musculoskeletal: pulses present


Neurological: moves all 4 limbs


Psychiatric: A&O x 3





Dx/Plan


(1) Syncope and collapse


Code(s): R55 - SYNCOPE AND COLLAPSE   Status: Acute   





(2) Ankylosing spondylitis


Code(s): M45.9 - ANKYLOSING SPONDYLITIS OF UNSPECIFIED SITES IN SPINE   Status: 

Acute   





(3) Acute on chronic diastolic CHF (congestive heart failure)


Code(s): I50.33 - ACUTE ON CHRONIC DIASTOLIC (CONGESTIVE) HEART FAILURE   Status

: Acute   





(4) Elevated troponin


Code(s): R74.8 - ABNORMAL LEVELS OF OTHER SERUM ENZYMES   Status: Acute   





(5) Weakness generalized


Code(s): R53.1 - WEAKNESS   Status: Acute   





(6) CAD (coronary artery disease)


Code(s): I25.10 - ATHSCL HEART DISEASE OF NATIVE CORONARY ARTERY W/O ANG PCTRS 

  Status: Chronic   





(7) Chronic anemia


Code(s): D64.9 - ANEMIA, UNSPECIFIED   Status: Chronic   





(8) DM type 2 (diabetes mellitus, type 2)


Status: Chronic   





(9) GERD (gastroesophageal reflux disease)


Code(s): K21.9 - GASTRO-ESOPHAGEAL REFLUX DISEASE WITHOUT ESOPHAGITIS   Status: 

Chronic   





(10) HTN (hypertension)


Code(s): I10 - ESSENTIAL (PRIMARY) HYPERTENSION   Status: Chronic   





(11) Morbid obesity with BMI of 40.0-44.9, adult


Code(s): E66.01 - MORBID (SEVERE) OBESITY DUE TO EXCESS CALORIES; Z68.41 - BODY 

MASS INDEX (BMI) 40.0-44.9, ADULT   Status: Chronic   





(12) GARCÍA (obstructive sleep apnea)


Code(s): G47.33 - OBSTRUCTIVE SLEEP APNEA (ADULT) (PEDIATRIC)   Status: Chronic

   





(13) Ulcer of right foot


Code(s): L97.519 - NON-PRS CHRONIC ULCER OTH PRT RIGHT FOOT W UNSP SEVERITY   

Status: Chronic   





- Plan





* cont lasix


* card input appreciated


* pt/ot


* syncope work up negative


* er


* 55%


* carotid doppler - no hemodynamical significant stenosis


* f/u telemetry for arrythmias


* pt would like to go home with

## 2017-10-08 NOTE — CON
NEUROLOGY CONSULTATION NOTE

 

DATE OF CONSULTATION:  10/08/2017

 

CONSULTING PHYSICIAN:  [facility].

 

IMPRESSION:

1.  Syncopal episode secondary to cardiac arrhythmia.

2.  History of congestive heart failure.

 

PLAN:  As per Cardiology's recommendations.

 

HISTORY OF PRESENT ILLNESS:  Mr. Snider is an 82-year-old gentleman with a past history of conges
tive heart failure.  He has been followed by Dr. Landers.  His wife found him unresponsive, lying i
n the bed.  Reportedly, after EMS arrival, he was found to have a severely low blood pressure and wa
s resuscitated.  Since admission, he has had some documented ventricular tachycardia on his EKGs.  H
is CT scan of the brain was unremarkable.  His carotid ultrasound and echocardiogram were unremarkab
le as well.  He denies any past history of blackouts.  He does not recall any particular prodromal e
vent and has no residual focal neurologic deficits.

 

PAST MEDICAL HISTORY:  As listed above.

 

ALLERGIES:  VALIUM, IODINE and SULFA.

 

SOCIAL HISTORY:  No tobacco or alcohol use.  He is  and lives at home with his wife.

 

FAMILY HISTORY:  Noncontributory.

 

REVIEW OF SYSTEMS:  No complaint of headache, nausea, vomiting or vertigo.

 

PHYSICAL EXAMINATION:

GENERAL:  He is an overweight elderly gentleman, lying in bed, in no acute distress.

HEENT:  Pupils are equal and reactive.  Conjunctivae clear.  Oropharynx clear.

NECK:  Supple.  No lymphadenopathy noted.

EXTREMITIES:  Right foot is deformed and wrapped.  No cyanosis or edema noted.

NEUROLOGIC:  He is alert and appropriate.  His speech is fluent and clear.  Cranial nerves II-XII we
re intact.  Motor exam showed symmetric strength without fix or drift.  Sensation was intact to ligh
t touch.  No tremor or asterixis was present.  Gait was not tested.

 

SUMMARY:  This is an elderly gentleman who presented with a syncopal episode and what appears to be 
ventricular tachycardia on this EKG, which would explain his event.  I do not see any neurologic def
icits.  Defer to Cardiology for further management.

## 2017-10-09 RX ADMIN — INSULIN LISPRO PRN UNIT: 100 INJECTION, SOLUTION INTRAVENOUS; SUBCUTANEOUS at 21:07

## 2017-10-09 RX ADMIN — INSULIN LISPRO PRN UNITS: 100 INJECTION, SOLUTION INTRAVENOUS; SUBCUTANEOUS at 08:55

## 2017-10-09 RX ADMIN — INSULIN LISPRO PRN UNITS: 100 INJECTION, SOLUTION INTRAVENOUS; SUBCUTANEOUS at 12:27

## 2017-10-09 RX ADMIN — INSULIN LISPRO PRN UNITS: 100 INJECTION, SOLUTION INTRAVENOUS; SUBCUTANEOUS at 17:42

## 2017-10-09 NOTE — PDOC.PN
- Subjective


Encounter Start Date: 10/09/17


Encounter Start Time: 11:19





doing better


no f/c


no n/v


pain well controlled on po meds


feels constipated





- Objective


MAR Reviewed: Yes


Vital Signs & Weight: 


 Vital Signs (12 hours)











  Temp Pulse Resp BP BP Pulse Ox


 


 10/09/17 09:03     166/70 H  


 


 10/09/17 08:58   58 L    


 


 10/09/17 07:45   58 L  18   166/70 H  98


 


 10/09/17 03:40  98.5 F  60  20   144/60 H  96








 Weight











Admit Weight                   301 lb


 


Weight                         297 lb 14.4 oz














I&O: 


 











 10/08/17 10/09/17 10/10/17





 06:59 06:59 06:59


 


Intake Total 1100 600 


 


Balance 1100 600 











Result Diagrams: 


 10/08/17 05:01





 10/08/17 05:01


Additional Labs: 


 Accuchecks











  10/09/17 10/09/17 10/08/17





  05:47 03:43 20:16


 


POC Glucose  218 H  288 H  354 H














  10/08/17 10/08/17





  16:41 11:07


 


POC Glucose  348 H  302 H














Phys Exam





- Physical Examination


Constitutional: NAD


HEENT: PERRLA


Neck: no JVD


Respiratory: no rales


Cardiovascular: no significant murmur


Gastrointestinal: no distention


Musculoskeletal: pulses present


Neurological: moves all 4 limbs


Psychiatric: A&O x 3





Dx/Plan


(1) Syncope and collapse


Code(s): R55 - SYNCOPE AND COLLAPSE   Status: Acute   





(2) Ankylosing spondylitis


Code(s): M45.9 - ANKYLOSING SPONDYLITIS OF UNSPECIFIED SITES IN SPINE   Status: 

Acute   





(3) Acute on chronic diastolic CHF (congestive heart failure)


Code(s): I50.33 - ACUTE ON CHRONIC DIASTOLIC (CONGESTIVE) HEART FAILURE   Status

: Acute   





(4) Elevated troponin


Code(s): R74.8 - ABNORMAL LEVELS OF OTHER SERUM ENZYMES   Status: Acute   





(5) Weakness generalized


Code(s): R53.1 - WEAKNESS   Status: Acute   





(6) CAD (coronary artery disease)


Code(s): I25.10 - ATHSCL HEART DISEASE OF NATIVE CORONARY ARTERY W/O ANG PCTRS 

  Status: Chronic   





(7) Chronic anemia


Code(s): D64.9 - ANEMIA, UNSPECIFIED   Status: Chronic   





(8) DM type 2 (diabetes mellitus, type 2)


Status: Chronic   





(9) GERD (gastroesophageal reflux disease)


Code(s): K21.9 - GASTRO-ESOPHAGEAL REFLUX DISEASE WITHOUT ESOPHAGITIS   Status: 

Chronic   





(10) HTN (hypertension)


Code(s): I10 - ESSENTIAL (PRIMARY) HYPERTENSION   Status: Chronic   





(11) Morbid obesity with BMI of 40.0-44.9, adult


Code(s): E66.01 - MORBID (SEVERE) OBESITY DUE TO EXCESS CALORIES; Z68.41 - BODY 

MASS INDEX (BMI) 40.0-44.9, ADULT   Status: Chronic   





(12) GARCÍA (obstructive sleep apnea)


Code(s): G47.33 - OBSTRUCTIVE SLEEP APNEA (ADULT) (PEDIATRIC)   Status: Chronic

   





(13) Ulcer of right foot


Code(s): L97.519 - NON-PRS CHRONIC ULCER OTH PRT RIGHT FOOT W UNSP SEVERITY   

Status: Chronic   





- Plan





* r/o arrhythmia (vent) as cause of syncope


* f/u dr falk recommendations


* possible loop recorder


* neuro and card robert's appreciated

## 2017-10-10 LAB
ANION GAP SERPL CALC-SCNC: 9 MMOL/L (ref 10–20)
BASOPHILS # BLD AUTO: 0 THOU/UL (ref 0–0.2)
BASOPHILS NFR BLD AUTO: 0.5 % (ref 0–1)
BUN SERPL-MCNC: 36 MG/DL (ref 8.4–25.7)
CALCIUM SERPL-MCNC: 8.4 MG/DL (ref 7.8–10.44)
CHLORIDE SERPL-SCNC: 102 MMOL/L (ref 98–107)
CO2 SERPL-SCNC: 36 MMOL/L (ref 23–31)
CREAT CL PREDICTED SERPL C-G-VRATE: 102 ML/MIN (ref 70–130)
EOSINOPHIL # BLD AUTO: 0.2 THOU/UL (ref 0–0.7)
EOSINOPHIL NFR BLD AUTO: 3.5 % (ref 0–10)
HCT VFR BLD CALC: 33.6 % (ref 42–52)
LYMPHOCYTES # BLD: 1.7 THOU/UL (ref 1.2–3.4)
LYMPHOCYTES NFR BLD AUTO: 36.3 % (ref 21–51)
MAGNESIUM SERPL-MCNC: 1.9 MG/DL (ref 1.6–2.6)
MONOCYTES # BLD AUTO: 0.5 THOU/UL (ref 0.11–0.59)
MONOCYTES NFR BLD AUTO: 11.5 % (ref 0–10)
NEUTROPHILS # BLD AUTO: 2.2 THOU/UL (ref 1.4–6.5)
RBC # BLD AUTO: 3.34 MILL/UL (ref 4.7–6.1)
WBC # BLD AUTO: 4.6 THOU/UL (ref 4.8–10.8)

## 2017-10-10 RX ADMIN — DOCUSATE SODIUM 50 MG AND SENNOSIDES 8.6 MG SCH TAB: 8.6; 5 TABLET, FILM COATED ORAL at 21:14

## 2017-10-10 RX ADMIN — INSULIN LISPRO PRN UNIT: 100 INJECTION, SOLUTION INTRAVENOUS; SUBCUTANEOUS at 21:25

## 2017-10-10 RX ADMIN — INSULIN LISPRO PRN UNITS: 100 INJECTION, SOLUTION INTRAVENOUS; SUBCUTANEOUS at 17:31

## 2017-10-10 NOTE — CON
DATE OF SERVICE:  10/10/2017

 

YOB: 1935

 

REFERRING PHYSICIAN:  Dr. Aman Landers 

 

I am seeing Mr. Snider at our [facility] as an electrophysiology consultant.  His proble
ms are:

1.  Syncopal spell.

2.  Nonsustained ventricular tachycardia.

3.  History of ischemic heart disease.

A.  History of coronary bypass grafting surgery and stent placements in the past.

4.  History of congestive heart failure.

A.  2D echo from 10/07/2017 demonstrates LVEF 55-60%, mild mitral and tricuspid regurgitation with d
iastolic dysfunction also.

5.  History ankylosing spondylitis.  

6.  History of obesity.

7.  Chronic back on fentanyl.

8.  History of chronic right ankle wound.

9.  History of hypertension.  

10.  Borderline abnormal enzymes with peak troponin 0.097.

11.  History of atrial fibrillation on Multaq. 

12.  Coronary risk factor with history of diabetes. 

13.  Sleep apnea. 

14.  Gastroesophageal reflux disease.

 

ALLERGIES:  DIAZEPAM, IODINE, CONTRAST DYE.

 

MEDICATIONS:  Prior to admission were including Lipitor, metformin, terazosin, ranitidine, insulin, 
ascorbic acid, cyanocobalamin, gabapentin, isosorbide, Multaq 400 mg twice a day, ferrous sulfate, D
exilant, glipizide, hydrocodone, fentanyl, furosemide, carvedilol, calcium carbonate, omega 3 fatty 
acid/Krill supplements, silver sulfasalazine, aspirin, mupirocin, ketaconazole, hydrocortisone, zinc
 and gabapentin.

 

SUBJECTIVE:  Mr. Snider was admitted after being poorly responsive and passed out for 50 minutes 
at home.  He just arrived back from our rehab and did fair, but then developed sudden poor conscious
ness, heavy breathing which was witnessed by his wife.  They called the ambulance and he eventually 
got woke up while being loaded in the ambulance, minimally responsive.  Prior to that, he had no his
tory of seizure activity, incontinence, no fever, no chills or cough.  No stroke-like symptoms are a
ppreciated.  He denies chest pains.  There is no new PND, orthopnea and no bleeding issues.

 

REVIEW OF SYSTEMS:  The rest of the twelve point review of systems was otherwise unremarkable.

 

HOSPITAL COURSE:  The patient was monitored in the hospital on telemetry.  He was noted to have epis
odes of slow wide complex tachyarrhythmia's up to 15 beats of ventricular tachycardia was seen.  He 
also had some pauses, 5 second pause with a ventricular escape beat in the middle of his 5 seconds i
s noted at night-time.  The ventricular arrhythmias are slow, about 100 beats per minutes in speed. 
 There is other four beat of more rapid arrhythmia seen also by complex monomorphic with rates about
 120 beats per minute are seen.

 

LABORATORY DATA:  Cardiac enzymes as above.  Sodium 143, potassium 4.9, BUN is 36, creatinine 1.1.  
White count 4.6, hemoglobin 10.3, platelet count is 244.

 

SOCIAL HISTORY:  Patient denies smoking, ETOH or drug use.

 

FAMILY HISTORY:  Noncontributory.  The patient's father was a physician.

 

ASSESSMENT AND PLAN:  Ms. Snider is a pleasant 82-year-old man with prior history of syncope, pankaj
stolic heart failure which is currently quiescent.  He also has coronary disease with stenting in 20
12.  He had been admitted with a syncopal spell, although not 100% convinced that they are actually 
related to the relatively slow ventricular arrhythmia.

 

He had good LV function, no definite evidence of acute ischemia, although borderline cardiac enzyme 
changes were seen.

 

PLAN:  My plan at this point:

1.  At this point, he does not meet a full criteria for ICD implant.  His LVEF is normal and his arr
hythmias are nonsustained.  EP study would likely be nonspecific, with poor predictive power.   For 
this reason, I would rather continue beta blocker therapy.  Long-term monitoring would be reasonable
 with an implanted loop recorder as were discussed with Dr. Landers.

2.  Bradycardia night time likely sleep apnea related with reasonable ventricular escape.  For now, 
continue monitoring as well.

3.  History of opioid use due to musculoskeletal pain.  Adjust to at least tolerable dose.

4.  History of atrial fibrillation, currently stable on Multaq.

5.  Sleep apnea, morbid obesity, minimize weight if possible.  CPAP is a good consideration for him.

 

I discussed these issues with the patient and his wife and they agree with the monitor.  We will set
 him up for early tomorrow morning.

## 2017-10-10 NOTE — PDOC.PN
- Subjective


Encounter Start Date: 10/10/17


Encounter Start Time: 10:00





Patient seen and examined. No new complaints. No overnight events. No CP





- Objective


MAR Reviewed: Yes


Vital Signs & Weight: 


 Vital Signs (12 hours)











  Temp Pulse Resp BP Pulse Ox


 


 10/10/17 16:30  97.7 F  64  20  175/69 H  94 L


 


 10/10/17 12:45  98.7 F  63  18  149/70 H  97


 


 10/10/17 10:10     163/72 H 


 


 10/10/17 08:45  97.9 F  61  18  


 


 10/10/17 08:20  97.9 F  61  18  181/72 H  93 L


 


 10/10/17 06:30     193/80 H 








 Weight











Admit Weight                   301 lb


 


Weight                         305 lb 4.8 oz














I&O: 


 











 10/09/17 10/10/17 10/11/17





 06:59 06:59 06:59


 


Intake Total 600 1521.5 


 


Balance 600 1521.5 











Result Diagrams: 


 10/10/17 04:55





 10/10/17 04:55


Additional Labs: 


 Accuchecks











  10/10/17 10/10/17 10/10/17





  16:57 11:26 05:45


 


POC Glucose  321 H  186 H  187 H














  10/09/17 10/09/17





  21:06 16:51


 


POC Glucose  252 H  227 H














Phys Exam





- Physical Examination


Constitutional: NAD


Respiratory: no wheezing, no rhonchi


Cardiovascular: RRR, no rub


Gastrointestinal: soft, non-tender, positive bowel sounds


Neurological: moves all 4 limbs





Dx/Plan


(1) Syncope


Code(s): R55 - SYNCOPE AND COLLAPSE   Status: Acute   





(2) Acute on chronic diastolic CHF (congestive heart failure)


Code(s): I50.33 - ACUTE ON CHRONIC DIASTOLIC (CONGESTIVE) HEART FAILURE   Status

: Acute   





(3) NSVT (nonsustained ventricular tachycardia)


Code(s): I47.2 - VENTRICULAR TACHYCARDIA   Status: Acute   





(4) CAD (coronary artery disease)


Code(s): I25.10 - ATHSCL HEART DISEASE OF NATIVE CORONARY ARTERY W/O ANG PCTRS 

  Status: Chronic   





(5) DM type 2 (diabetes mellitus, type 2)


Status: Chronic   





(6) Other issues per previous notes








- Plan


cont current plan of care, DVT proph w/SCDs





* Loop recorder placement


* Treat constipation


* Cont current meds as below


* Cardio/EP following





Review of Systems





- Review of Systems


Constitutional: negative: Fever, Chills, Sweats, Weakness, Malaise, Other


Respiratory: negative: Cough, Dry, Shortness of Breath, Hemoptysis, SOB with 

Excertion, Pleuritic Pain, Sputum, Wheezing


Cardiovascular: negative: Chest Pain, Palpitations, Orthopnea, Paroxysmal Noc. 

Dyspnea, Edema, Light Headedness, Other


Gastrointestinal: Constipation.  negative: Nausea, Vomiting, Abdominal Pain, 

Diarrhea, Melena, Hematochezia, Other





- Medications/Allergies


Allergies/Adverse Reactions: 


 Allergies











Allergy/AdvReac Type Severity Reaction Status Date / Time


 


diazepam [From Valium] Allergy   Verified 10/07/17 01:10


 


Iodinated Contrast- Oral and Allergy   Verified 10/07/17 01:10





IV Dye     





[Iodinated Contrast Media -     





IV Dye]     


 


sodium hypochlorite solution Allergy   Verified 10/07/17 01:10





[sodium hypochlorite]     


 


Sulfa (Sulfonamide Allergy   Verified 10/07/17 01:10





Antibiotics)     


 


SODIUM HYDROXIDE Allergy   Uncoded 04/27/16 23:40


 


sodium hyperchlorite Allergy   Uncoded 09/12/17 14:58











Medications: 


 Current Medications





Hydrocodone Bitart/Acetaminophen (Norco 5/325)  1 tab PO Q6H PRN


   PRN Reason: Pain


   Last Admin: 10/06/17 23:55 Dose:  1 tab


Aspirin (Ecotrin)  325 mg PO DAILY [facility]


   Last Admin: 10/10/17 08:51 Dose:  325 mg


Atorvastatin Calcium (Lipitor)  80 mg PO HS [facility]


   Last Admin: 10/09/17 20:52 Dose:  80 mg


Bisacodyl (Dulcolax)  10 mg [place] DAILYPRN PRN


   PRN Reason: Constipation


Carvedilol (Coreg)  6.25 mg PO BID [facility]


   Last Admin: 10/10/17 08:51 Dose:  6.25 mg


Dextrose/Water (Dextrose 50%)  25 gm SLOW IVP PRN PRN


   PRN Reason: Hypoglycemia


Docusate Sodium (Colace)  100 mg PO BIDPRN PRN


   PRN Reason: Constipation


   Last Admin: 10/10/17 14:45 Dose:  100 mg


Dronedarone (Multaq)  400 mg PO BID-[facility]


   Last Admin: 10/10/17 16:54 Dose:  400 mg


Ferrous Sulfate (Feosol)  325 mg PO BID-[facility]


   Last Admin: 10/10/17 16:55 Dose:  325 mg


Finasteride (Proscar)  5 mg PO DAILY [facility]


   Last Admin: 10/10/17 08:51 Dose:  5 mg


Furosemide (Lasix)  40 mg PO 0900,1400 [facility]


   Last Admin: 10/10/17 14:44 Dose:  40 mg


Gabapentin (Neurontin)  900 mg PO BID [facility]


   Last Admin: 10/10/17 08:52 Dose:  900 mg


Glucagon (Glucagon)  1 mg IM PRN PRN


   PRN Reason: Hypoglycemia


Hydralazine HCl (Apresoline)  10 mg SLOW IVP Q4H PRN


   PRN Reason: SBP Greater Than 180


   Last Admin: 10/10/17 04:53 Dose:  10 mg


Hydralazine HCl (Apresoline)  37.5 mg PO TID [facility]


   Last Admin: 10/10/17 14:45 Dose:  37.5 mg


Dextrose/Water (D5w)  1,000 mls @ 0 mls/hr IV .Q0M PRN; As Directed


   PRN Reason: Hypoglycemia


Insulin Detemir 30 units/ (Miscellaneous Medication)  0.3 mls @ 0 mls/hr SC QAM 

[facility]


   PRN Reason: As Directed


   Last Admin: 10/10/17 08:53 Dose:  Not Given


Insulin Human Lispro (Humalog)  0 units SC .MODERATE SLIDING SC PRN


   PRN Reason: Moderate Correctional Scale


   Last Admin: 10/09/17 17:42 Dose:  4 units


Insulin Human Lispro (Humalog)  0 units SC .BEDTIME SLIDING SC PRN


   PRN Reason: Bedtime Correctional Scale


   Last Admin: 10/09/17 21:07 Dose:  3 unit


Isosorbide Dinitrate (Isordil)  20 mg PO TID [facility]


   Last Admin: 10/10/17 14:45 Dose:  20 mg


Losartan Potassium (Cozaar)  25 mg PO DAILY [facility]


   Last Admin: 10/10/17 08:52 Dose:  25 mg


Pantoprazole Sodium (Protonix)  40 mg PO HS [facility]


   Last Admin: 10/09/17 20:54 Dose:  40 mg


Polyethylene Glycol (Miralax)  17 gm PO DAILY PRN


   PRN Reason: Constipation


   Last Admin: 10/10/17 14:44 Dose:  17 gm


Senna/Docusate Sodium (Senokot S)  1 tab PO BID [facility]


Terazosin HCl (Hytrin)  5 mg PO DAILY [facility]


   Last Admin: 10/10/17 08:52 Dose:  5 mg

## 2017-10-11 LAB
ANION GAP SERPL CALC-SCNC: 10 MMOL/L (ref 10–20)
BUN SERPL-MCNC: 32 MG/DL (ref 8.4–25.7)
CALCIUM SERPL-MCNC: 8.6 MG/DL (ref 7.8–10.44)
CHLORIDE SERPL-SCNC: 102 MMOL/L (ref 98–107)
CO2 SERPL-SCNC: 35 MMOL/L (ref 23–31)
CREAT CL PREDICTED SERPL C-G-VRATE: 113 ML/MIN (ref 70–130)
MAGNESIUM SERPL-MCNC: 1.7 MG/DL (ref 1.6–2.6)

## 2017-10-11 PROCEDURE — 0JH602Z INSERTION OF MONITORING DEVICE INTO CHEST SUBCUTANEOUS TISSUE AND FASCIA, OPEN APPROACH: ICD-10-PCS | Performed by: INTERNAL MEDICINE

## 2017-10-11 RX ADMIN — INSULIN LISPRO PRN UNIT: 100 INJECTION, SOLUTION INTRAVENOUS; SUBCUTANEOUS at 22:13

## 2017-10-11 RX ADMIN — DOCUSATE SODIUM 50 MG AND SENNOSIDES 8.6 MG SCH TAB: 8.6; 5 TABLET, FILM COATED ORAL at 09:10

## 2017-10-11 RX ADMIN — Medication SCH ML: at 20:05

## 2017-10-11 RX ADMIN — INSULIN LISPRO PRN UNITS: 100 INJECTION, SOLUTION INTRAVENOUS; SUBCUTANEOUS at 17:51

## 2017-10-11 RX ADMIN — DOCUSATE SODIUM 50 MG AND SENNOSIDES 8.6 MG SCH TAB: 8.6; 5 TABLET, FILM COATED ORAL at 20:05

## 2017-10-11 NOTE — OP
DATE OF PROCEDURE:  10/11/2017

 

PROCEDURE:  Loop recorder insertion report. 

 

REFERRING PHYSICIAN:   Dr. Landers

 

REASON FOR PROCEDURE:  Syncope and nonsustained VT.

 

PROCEDURE:  The patient was prepped and draped in the prepectoral area and anesthetized in the 4th i
ntercostal space with subcutaneous lidocaine.  After adequate analgesia achieved, an incision was ma
de and the LINQ tool kit, a [employer] LINQ loop recorder was inserted.  The wound was closed with De
rmabond

 

CONCLUSION:  Successful insertion of loop recorder.

 

PLAN:  Routine monitoring, maximize beta blocker therapy.

## 2017-10-11 NOTE — PRG
DATE OF SERVICE:  10/11/2017

 

ELECTROPHYSIOLOGY FOLLOWUP NOTE

 

REFERRING PHYSICIAN:  Dr. Landers.

 

SUBJECTIVE:  Mr. Snider is doing well.  Ready for his loop recorder implant.

 

OBJECTIVE DATA:

VITAL SIGNS:  Blood pressure 182/70, heart rate 74, respirations 12, the patient is afebrile.

GENERAL:  He is alert and oriented man, in no apparent distress.

NECK:  Supple.  Jugular veins not distended.

ABDOMEN:  Benign.  Bowel sounds positive.

EXTREMITIES:  Lower extremities without edema, clubbing or cyanosis.

 

DATABASE:  Telemetry was reviewed and reveals sinus rhythm.

 

ASSESSMENT:  Mr. Snider is a pleasant 82-year-old male with prior history of paroxysmal atrial fi
brillation, suppressed with Multaq.  Now presenting with syncopal spell and had been noted to have n
onsustained ventricular arrhythmias, although they were not very rapid.  Loop recorder was inserted 
to evaluate for future arrhythmias.  His left ventricular ejection fraction is normal at this time.

 

PLAN:

1.  Maximize beta blocker therapy and monitor with loop recorder.

2.  Continue dronedarone.

## 2017-10-11 NOTE — PDOC.PN
- Subjective


Encounter Start Date: 10/11/17


Encounter Start Time: 08:00





Patient seen and examined. No new complaints. No overnight events. Loop 

recorder today





- Objective


MAR Reviewed: Yes


Vital Signs & Weight: 


 Vital Signs (12 hours)











  Temp Pulse Resp BP Pulse Ox


 


 10/11/17 09:05  97.8 F  82  20  186/96 H  93 L


 


 10/11/17 07:38  97.6 F  74  20  219/90 H  95


 


 10/11/17 04:00  98.3 F  62  20  139/65  91 L


 


 10/11/17 00:00  98.1 F  61  20  174/73 H  92 L








 Weight











Admit Weight                   301 lb


 


Weight                         304 lb 3.2 oz














I&O: 


 











 10/10/17 10/11/17 10/12/17





 06:59 06:59 06:59


 


Intake Total 1521.5 1260 


 


Balance 1521.5 1260 











Result Diagrams: 


 10/10/17 04:55





 10/11/17 04:16


Additional Labs: 


 Accuchecks











  10/11/17 10/10/17 10/10/17





  05:44 20:26 16:57


 


POC Glucose  232 H  315 H  321 H














  10/10/17





  11:26


 


POC Glucose  186 H











EKG Reviewed by me: Yes (Tele SR)





Phys Exam





- Physical Examination


Constitutional: NAD


Respiratory: no wheezing, no rhonchi


Cardiovascular: RRR, no rub


Gastrointestinal: soft, non-tender, positive bowel sounds


Musculoskeletal: edema present


Neurological: moves all 4 limbs





Dx/Plan


(1) Syncope


Code(s): R55 - SYNCOPE AND COLLAPSE   Status: Acute   





(2) Acute on chronic diastolic CHF (congestive heart failure)


Code(s): I50.33 - ACUTE ON CHRONIC DIASTOLIC (CONGESTIVE) HEART FAILURE   Status

: Acute   





(3) NSVT (nonsustained ventricular tachycardia)


Code(s): I47.2 - VENTRICULAR TACHYCARDIA   Status: Acute   





(4) CAD (coronary artery disease)


Code(s): I25.10 - ATHSCL HEART DISEASE OF NATIVE CORONARY ARTERY W/O ANG PCTRS 

  Status: Chronic   





(5) DM type 2 (diabetes mellitus, type 2)


Status: Chronic   





(6) Other issues per previous notes








- Plan


cont current plan of care, plan discussed w/ family, DVT proph w/lovenox, DVT 

proph w/SCDs





* Loop recorder today


* Cardio/EP following


* Cont wound care


* DC planning





Review of Systems





- Review of Systems


Respiratory: negative: Cough, Dry, Shortness of Breath, Hemoptysis, SOB with 

Excertion, Pleuritic Pain, Sputum, Wheezing


Cardiovascular: negative: Chest Pain, Palpitations, Orthopnea, Paroxysmal Noc. 

Dyspnea, Edema, Light Headedness, Other


Gastrointestinal: negative: Nausea, Vomiting, Abdominal Pain, Diarrhea, 

Constipation, Melena, Hematochezia, Other





- Medications/Allergies


Allergies/Adverse Reactions: 


 Allergies











Allergy/AdvReac Type Severity Reaction Status Date / Time


 


diazepam [From Valium] Allergy   Verified 10/07/17 01:10


 


Iodinated Contrast- Oral and Allergy   Verified 10/07/17 01:10





IV Dye     





[Iodinated Contrast Media -     





IV Dye]     


 


sodium hypochlorite solution Allergy   Verified 10/07/17 01:10





[sodium hypochlorite]     


 


Sulfa (Sulfonamide Allergy   Verified 10/07/17 01:10





Antibiotics)     


 


SODIUM HYDROXIDE Allergy   Uncoded 04/27/16 23:40


 


sodium hyperchlorite Allergy   Uncoded 09/12/17 14:58











Medications: 


 Current Medications





Hydrocodone Bitart/Acetaminophen (Norco 5/325)  1 tab PO Q6H PRN


   PRN Reason: Pain


   Last Admin: 10/06/17 23:55 Dose:  1 tab


Aspirin (Ecotrin)  325 mg PO DAILY [facility]


   Last Admin: 10/11/17 09:09 Dose:  325 mg


Atorvastatin Calcium (Lipitor)  80 mg PO [facility]


   Last Admin: 10/10/17 21:14 Dose:  80 mg


Bisacodyl (Dulcolax)  10 mg [place] DAILYPRN PRN


   PRN Reason: Constipation


Carvedilol (Coreg)  6.25 mg PO BID [facility]


   Last Admin: 10/11/17 07:46 Dose:  6.25 mg


Dextrose/Water (Dextrose 50%)  25 gm SLOW IVP PRN PRN


   PRN Reason: Hypoglycemia


Docusate Sodium (Colace)  100 mg PO BIDPRN PRN


   PRN Reason: Constipation


   Last Admin: 10/10/17 14:45 Dose:  100 mg


Dronedarone (Multaq)  400 mg PO BID-[facility]


   Last Admin: 10/11/17 09:09 Dose:  400 mg


Ferrous Sulfate (Feosol)  325 mg PO BID-[facility]


   Last Admin: 10/11/17 09:09 Dose:  325 mg


Finasteride (Proscar)  5 mg PO DAILY [facility]


   Last Admin: 10/11/17 09:09 Dose:  5 mg


Furosemide (Lasix)  40 mg PO 0900,1400 [facility]


   Last Admin: 10/11/17 09:09 Dose:  40 mg


Gabapentin (Neurontin)  900 mg PO BID [facility]


   Last Admin: 10/11/17 09:09 Dose:  900 mg


Glucagon (Glucagon)  1 mg IM PRN PRN


   PRN Reason: Hypoglycemia


Hydralazine HCl (Apresoline)  10 mg SLOW IVP Q4H PRN


   PRN Reason: SBP Greater Than 180


   Last Admin: 10/11/17 07:49 Dose:  10 mg


Hydralazine HCl (Apresoline)  37.5 mg PO TID [facility]


   Last Admin: 10/11/17 09:09 Dose:  37.5 mg


Dextrose/Water (D5w)  1,000 mls @ 0 mls/hr IV .Q0M PRN; As Directed


   PRN Reason: Hypoglycemia


Insulin Detemir 30 units/ (Miscellaneous Medication)  0.3 mls [facility] 0 [facility]


   PRN Reason: As Directed


   Last Admin: 10/11/17 09:15 Dose:  0.3 mls


Insulin Human Lispro (Humalog)  0 units SC .MODERATE SLIDING SC PRN


   PRN Reason: Moderate Correctional Scale


   Last Admin: 10/10/17 17:31 Dose:  8 units


Insulin Human Lispro (Humalog)  0 units SC .BEDTIME SLIDING SC PRN


   PRN Reason: Bedtime Correctional Scale


   Last Admin: 10/10/17 21:25 Dose:  4 unit


Isosorbide Dinitrate (Isordil)  20 mg PO TID [facility]


   Last Admin: 10/11/17 09:10 Dose:  20 mg


Losartan Potassium (Cozaar)  25 mg PO DAILY [facility]


   Last Admin: 10/11/17 09:10 Dose:  25 mg


Pantoprazole Sodium (Protonix)  40 mg PO HS [facility]


   Last Admin: 10/10/17 21:14 Dose:  40 mg


Polyethylene Glycol (Miralax)  17 gm PO DAILY PRN


   PRN Reason: Constipation


   Last Admin: 10/10/17 14:44 Dose:  17 gm


Senna/Docusate Sodium (Senokot S)  1 tab PO BID [facility]


   Last Admin: 10/11/17 09:10 Dose:  1 tab


Spironolactone (Aldactone)  25 mg PO QAM-[facility]


Spironolactone (Aldactone)  25 mg PO 1000 [facility]


   Stop: 10/11/17 12:00


   Last Admin: 10/11/17 09:20 Dose:  25 mg


Terazosin HCl (Hytrin)  5 mg PO DAILY [facility]


   Last Admin: 10/11/17 09:10 Dose:  5 mg

## 2017-10-12 VITALS — SYSTOLIC BLOOD PRESSURE: 179 MMHG | DIASTOLIC BLOOD PRESSURE: 70 MMHG

## 2017-10-12 VITALS — TEMPERATURE: 97 F

## 2017-10-12 RX ADMIN — INSULIN LISPRO PRN UNITS: 100 INJECTION, SOLUTION INTRAVENOUS; SUBCUTANEOUS at 12:01

## 2017-10-12 RX ADMIN — Medication SCH ML: at 09:05

## 2017-10-12 RX ADMIN — INSULIN LISPRO PRN UNITS: 100 INJECTION, SOLUTION INTRAVENOUS; SUBCUTANEOUS at 06:27

## 2017-10-12 RX ADMIN — DOCUSATE SODIUM 50 MG AND SENNOSIDES 8.6 MG SCH TAB: 8.6; 5 TABLET, FILM COATED ORAL at 09:04

## 2017-10-12 NOTE — DIS
DATE OF ADMISSION:  10/06/2017

 

DATE OF DISCHARGE:  10/12/2017

 

DISCHARGE DISPOSITION:  Home with [facility].

 

FOLLOWUP:

1.  Follow up with primary care physician, Dr. Donaldson in 1 week.

2.  Follow up with Electrophysiology, Dr. Garcia and Cardiology, Dr. Landers in 10-14 days.

3.  Follow up with wound care Dr. Adorno next week. 

4.  Fall precautions with daily weights and base met after 1 and 4 weeks.

 

DISCHARGE MEDICATIONS:

1.  Carvedilol 6.25 mg 4 times a day (new medication).

2.  Aldactone 25 mg daily (new medication).

 

Other home medications were resumed.

1.  Vitamin C 1000 mg twice a day.

2.  Aspirin 81 mg daily.

3.  Lipitor 80 mg at bedtime.

4.  Calcium with Vitamin D 1 tablet daily.

5.  Vitamin B12 1000 mcg IM every 30 days.

6.  Dexilant 60 mg at bedtime.

7.  Multaq 400 mg b.i.d.

8.  Ferrous sulfate 325 mg b.i.d.

9.  Lasix 40 mg daily.

10.  Neurontin 600 mg b.i.d.

11.  Gabapentin 300 mg b.i.d.

12.  Hydrocortisone cream as directed.

13.  Lantus 30 units daily with regular insulin sliding scale.

14.  BiDil one tablet twice a day.  The patient states that he normally takes it 3 times a day.

15.  Ketoconazole as directed.

16.  Krill oil daily.

17.  Mupirocin as directed.

18.  Ranitidine 300 mg daily.

19.  Silver sulfadiazine as directed.

20.  Hytrin 5 mg daily.

21.  Zinc 50 mg daily.

22.  Glipizide 10 mg daily.

23.  Metformin 1000 mg b.i.d.

 

INPATIENT CONSULTANTS:

1.  Cardiology, Dr. Landers.

2.  Electrophysiology, Dr. Garcia.

3.  Neurology, Dr. Cohen.

 

BRIEF HOSPITAL COURSE:  The patient is an 82-year-old male with chronic pain syndrome, morbid obesit
y, coronary artery disease, status post stent and chronic diastolic heart failure who presented to [facility] with generalized weakness, lethargy and altered mentation.  Please refer to the history 
and physical dated 10/17/2017 for further details.

 

The patient was admitted to the hospital with the diagnosis of acute-on-chronic diastolic heart fail
ure exacerbation along with syncope.

 

HOSPITAL COURSE:  The patient was admitted to the hospital with the diagnosis of acute-on-chronic di
astolic heart failure exacerbation along with syncope.  He was started on diuretics.  He was evaluat
ed by Neurology, Dr. Cohen, who recommended Cardiology consultation for possible cardiac arrhythmi
a as a cause of syncope.  The patient was seen by Cardiology, Dr. Landers, as well as Electrophysio
logy, Dr. Garcia.  An echocardiogram was repeated that showed ejection fraction 55%-60% with probable 
diastolic dysfunction, mild tricuspid regurgitation, and mild mitral regurgitation.  His carotid Dop
pler was negative for hemodynamically significant stenosis.  Bilateral lower extremity Doppler was n
egative for DVT.  The loop recorder was placed yesterday by Electrophysiology.  The patient has been
 cleared by Cardiology and Electrophysiology for discharge.  His weight on the day of discharge is 2
92 pounds compared to 301 pounds on admission.  He was extensively counseled on heart failure.

 

Total time coordinating the discharge of this patient was 39 minutes.

 

FINAL DIAGNOSES:

1.  Generalized weakness with syncope, multifactorial.

2.  Acute-on-chronic diastolic heart failure exacerbation.

3.  Nonsustained ventricular tachycardia during this hospital stay.  Loop recorder has been placed.

4.  Coronary artery disease, status post stent placement.

5.  Diabetes mellitus type 2.

6.  History of ankylosing spondylitis.

7.  Chronic deconditioning.

8.  History of spinal cord injury.

9.  Obstructive sleep apnea, on continuous positive airway pressure.

10.  Chronic urinary and fecal incontinence.

11.  History of chronic gastrointestinal blood loss.  Patient was admitted last year with hemoglobin
 of 5.  Eliquis was discontinued due to this reason.

12.  Paroxysmal atrial fibrillation.

13.  Hypertension.

14.  Dyslipidemia.

15.  Obesity with a body mass index 39.6.

16.  Indeterminate troponins, probably secondary to demand ischemia from congestive heart failure.

17.  Chronic anemia.

18.  Hypernatremia on admission, resolved.  His sodium on admission was 147.

 

LABORATORY DATA:  Significant laboratories:

1.  BNP 1523.

2.  Troponin maximum was 0.097.

3.  Creatinine on the day of discharge was 1.0 with BUN 32.

 

Total time coordinating the discharge was 39 minutes.

## 2017-10-18 ENCOUNTER — HOSPITAL ENCOUNTER (OUTPATIENT)
Dept: HOSPITAL 92 - WCC | Age: 82
Discharge: HOME | End: 2017-10-18
Attending: FAMILY MEDICINE
Payer: MEDICARE

## 2017-10-18 DIAGNOSIS — E11.621: Primary | ICD-10-CM

## 2017-10-18 DIAGNOSIS — I25.10: ICD-10-CM

## 2017-10-18 DIAGNOSIS — Z86.79: ICD-10-CM

## 2017-10-18 DIAGNOSIS — K21.9: ICD-10-CM

## 2017-10-18 DIAGNOSIS — M45.9: ICD-10-CM

## 2017-10-18 DIAGNOSIS — L97.519: ICD-10-CM

## 2017-10-18 DIAGNOSIS — I10: ICD-10-CM

## 2017-10-18 DIAGNOSIS — G47.33: ICD-10-CM

## 2017-10-18 DIAGNOSIS — I89.0: ICD-10-CM

## 2017-10-18 PROCEDURE — 99203 OFFICE O/P NEW LOW 30 MIN: CPT

## 2017-10-18 PROCEDURE — G0463 HOSPITAL OUTPT CLINIC VISIT: HCPCS

## 2017-10-18 PROCEDURE — A4218 STERILE SALINE OR WATER: HCPCS

## 2017-10-18 PROCEDURE — 82962 GLUCOSE BLOOD TEST: CPT

## 2017-10-18 PROCEDURE — 97139 UNLISTED THERAPEUTIC PX: CPT

## 2017-10-18 PROCEDURE — 36416 COLLJ CAPILLARY BLOOD SPEC: CPT

## 2017-10-18 PROCEDURE — 97602 WOUND(S) CARE NON-SELECTIVE: CPT

## 2017-10-18 NOTE — HP
DATE OF SERVICE:  10/18/2017

 

HISTORY OF PRESENT ILLNESS:  Mr. Martín Snider is a very pleasant 82-year-old gentleman accompani
ed by his wife, who presents to the [facility] for evaluation of an ulceration of the right latera
l foot.  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  The patient st
ates that he has a special boot for his right foot, which he has not been able to wear.  He states t
hat the boot no longer fits his right foot.  The patient states that he has had a callus over the ri
ght lateral foot for his entire life.  He states that after a fall, he spent a great deal of time in
 bed and the callus over his right lateral foot \\"fell off\\" leaving an open wound.  He states rocio
t the wound has been present for approximately 4 months.  The patient's wife states that she has karen
lied moisturizers to the callus of the right lateral foot prior to development of the open wound.  M
RI of the right foot was obtained on 09/27/2017, which showed no evidence of osteomyelitis.  The pat
geovani has received dressing changes with the assistance of Home Health for his wound.  Apparently, th
e patient has received a trial of a dressing containing silver as well as a trial of Medihoney.

 

PAST MEDICAL HISTORY:

1.  Coronary artery disease.

2.  Hypertension.

3.  Ankylosing spondylitis.

4.  Obstructive sleep apnea.

5.  History of paroxysmal atrial fibrillation.

6.  Gastroesophageal reflux disease.

7.  Diabetes mellitus.

 

PAST SURGICAL HISTORY:

1.  Hemorrhoidectomy.

2.  Finger surgery.

 

MEDICATIONS:  The patient does not have a list of his medications with him today.

 

ALLERGIES:  IODINE.

 

SOCIAL HISTORY:  Significant for tobacco use of up to 2-3 packs of cigarettes per day for 10 years. 
 The patient states that he stopped smoking 50 years ago.  The patient denies any history of ETOH us
e.

 

FAMILY HISTORY:  Significant for diabetes mellitus.  The patient states that he has one aunt, who wa
s diagnosed with diabetes mellitus.  Family history is also significant for coronary artery disease.
  The patient states that his maternal grandfather was diagnosed with coronary artery disease.

 

PHYSICAL EXAMINATION:

VITAL SIGNS:  Temperature 97.6, pulse 64, respirations 18, blood pressure 138/64.  Accu-Chek 169.

GENERAL:  An 82-year-old gentleman sitting on wheelchair in examination room, in no acute distress.

HEENT:  Normocephalic, atraumatic.

NECK:  No nuchal rigidity.

CHEST:  Clear to auscultation.

CARDIAC:  Regular rate and rhythm.

ABDOMEN:  Soft.

EXTREMITIES:  An ulceration of the right lateral foot is present, which measures approximately 2.5 x
 2.0 cm.  Granulation tissue is visible within the wound margins.  No purulent drainage is associate
d with the wound.  No cellulitis of the right foot is appreciated.  No maceration of the skin of the
 periwound is noted.  A dorsalis pedis or posterior tibial pulse is not palpable on the right.  Sign
ificant edema of the right foot and lower leg is present, however, on exam today.

 

ASSESSMENT AND PLAN:

1.  Ulceration of right lateral foot as described above.  Dressing changes of Silverlon, 4 x 4s, Ker
lix, and an Ace bandage will be initiated today.  These dressing changes are to be performed 3 times
 per week after cleansing and irrigation with the assistance of Home Health.  I will see Mr. Diane cifuentes again in 2 weeks.

2.  Lymphedema.  Arrangements will be made for the initiation of in-home lymphedema therapy.  Circum
ferences of the right and left lower extremities were obtained today at the ankle, calf and knee.  T
hese measurements on the right were 33.2 cm, 43.8 cm and 48 cm.  These measurements on the left were
 31.6 cm, 42.0 cm and 45.3 cm.

3.  Coronary artery disease.

4.  Hypertension.

5.  Ankylosing spondylitis.

6.  Obstructive sleep apnea.

7.  History of paroxysmal atrial fibrillation.

8.  Gastroesophageal reflux disease.

9.  Diabetes mellitus.  The patient's Accu-Chek in clinic today was 169.  The patient has been told 
that for optimal wound healing, if his blood glucoses should remain below 150.

## 2017-11-01 ENCOUNTER — HOSPITAL ENCOUNTER (OUTPATIENT)
Dept: HOSPITAL 92 - WCC | Age: 82
Discharge: HOME | End: 2017-11-01
Attending: FAMILY MEDICINE
Payer: MEDICARE

## 2017-11-01 DIAGNOSIS — I10: ICD-10-CM

## 2017-11-01 DIAGNOSIS — I89.0: ICD-10-CM

## 2017-11-01 DIAGNOSIS — E11.621: Primary | ICD-10-CM

## 2017-11-01 DIAGNOSIS — G47.33: ICD-10-CM

## 2017-11-01 DIAGNOSIS — L97.529: ICD-10-CM

## 2017-11-01 DIAGNOSIS — M45.9: ICD-10-CM

## 2017-11-01 DIAGNOSIS — K21.9: ICD-10-CM

## 2017-11-01 DIAGNOSIS — Z86.79: ICD-10-CM

## 2017-11-01 DIAGNOSIS — I25.10: ICD-10-CM

## 2017-11-01 PROCEDURE — A4218 STERILE SALINE OR WATER: HCPCS

## 2017-11-01 PROCEDURE — 97602 WOUND(S) CARE NON-SELECTIVE: CPT

## 2017-11-01 NOTE — XMS
Clinical Summary

 Created on:2017



Patient:Martín Snider

Sex:Male

:1935

External Reference #:[id number]





Demographics







 Address  [address] 



   [place] [address]

 

 Home Phone  [phone]

 

 Mobile Phone  [phone]

 

 Email Address  [email or web address]

 

 Preferred Language  English

 

 Marital Status  Unknown

 

 Scientologist Affiliation  Unknown

 

 Race  White

 

 Ethnic Group  Not  or 









Author







 Organization  [facility]

 

 Address  [address]



   [place] [address]

 

 Phone  [phone]









Support







 Name  Relationship  Address  Phone

 

 , Kat Snider  Emergency Contact  [address]   [phone]



     [place] [address]  









Care Team Providers







 Name  Role  Phone

 

 ,  Primary Care Provider  Unavailable









Allergies







 Active Allergy  Reactions  Severity  Noted Date  Comments

 

 Iodine And Iodide Containing Products      2015  Anaphylaxis

 

 Diazepam      2015  Hallucination







Current Medications

Not on file



Active Problems

Not on file



Social History







 Tobacco Use  Types  Packs/Day  Years Used  Date

 

 Never Assessed        









 Sex Assigned at Birth  Date Recorded

 

 Not on file  







Last Filed Vital Signs







 Vital Sign  Reading  Time Taken

 

 Blood Pressure  -  -

 

 Pulse  -  -

 

 Temperature  -  -

 

 Respiratory Rate  -  -

 

 Oxygen Saturation  -  -

 

 Inhaled Oxygen Concentration  -  -

 

 Weight  146.1 kg (322 lb)  2015 12:00 PM CDT

 

 Height  188 cm (6' 2")  2015 12:00 PM CDT

 

 Body Mass Index  41.34  2015 12:00 PM CDT







Plan of Treatment







 Date  Type  Specialty  Care Team  Description

 

 11/15/2017  Orders Only  Lab  Hubert Salinas MD



  



       [address]



  



       [place], [place] [id number] [phone] (Fax)  

 

 11/15/2017  Appointment    Hubert Salinas MD



  



       [address]



  



       [place] [address] [phone] (Fax)  







Results

Not on filefrom Last 3 Months

## 2017-11-01 NOTE — PRG
DATE OF SERVICE:  11/01/2017

 

HISTORY:  Mr. Martín Snider is a very pleasant 82-year-old gentleman accompanied by his wife who 
presents to the Wound Center for evaluation of an ulceration of right lateral foot.  The patient's m
edical history is significant for clubfoot on the right.  The patient previously stated that he has 
a special boot for his right foot which he has not been able to wear.  He stated that the boot no lo
nger fits his right foot.  The patient stated at the time of his initial presentation to the W[facility] C
enter that he has had a callus over the right lateral foot for his entire life.  He stated that afte
r a fall, he spent a great deal of time in bed and the callus over his right lateral foot \\"fell of
f\\" leaving an open wound.  When the patient initially presented to the Wound Center, he stated rocio
t the wound had been present for approximately 4 months.  The patient's wife stated that she had bee
n applying moisturizers to the callus of the right lateral foot prior to development of the open wou
nd.  MRI of the right foot was obtained on 09/27/2017 which showed no evidence of osteomyelitis.  Th
e patient has received dressing changes with the assistance of Home Health for his wounds.  Apparent
ly, the patient had received a trial of a dressing containing silver as well as a trial of MEDIHONEY
 prior to being seen in the Wound Center.

 

PHYSICAL EXAMINATION:

VITAL SIGNS:  Temperature 97.7, pulse 68, respirations 16, blood pressure 129/58, Accu-Chek 298.

EXTREMITIES:  An ulceration of the right lateral foot is present which measures approximately 4.5 x 
4.0 cm.  The dimensions of the wound at the time of the patient's initial presentation to the Wound 
Center were approximately 2.5 x 2.0 cm.  Granulation tissue is visible within the wound margins.  No
 purulent drainage is associated with the wound.  Ecchymosis of the periwound is present on exam tod
ay.  No cellulitis of the right foot is appreciated.  No maceration of the skin of the periwound is 
noted.  A dorsalis pedis or posterior tibial pulse is not palpable on the right.  Again, significant
 edema of the right foot and lower leg is present on today's exam.

 

ASSESSMENT AND PLAN:

1.  Ulceration of right lateral foot as described above.  Dressing changes of Silverlon or Adaptic f
ollowed by 4 x 4s, Kerlix, and an Ace bandage are to be performed 3 times per week after cleansing a
nd irrigation with the assistance of Home Health.  I will also discuss the treatment plan with the [facility] home health nurse.  I will see Mr. Snider again in one week.

2.  Lymphedema.  Arrangements will continue to be made for the initiation of in-home lymphedema ther
apy.

3.  Coronary artery disease.

4.  Hypertension.

5.  Ankylosing spondylitis.

6.  Obstructive sleep apnea.

7.  History of paroxysmal atrial fibrillation

8.  Gastroesophageal reflux disease.

9.  Diabetes mellitus.  The patient's Accu-Chek in clinic today is 298.  The patient has been remind
ed that for optimal wound healing, his blood glucoses should remain below 150.

## 2017-12-07 ENCOUNTER — HOSPITAL ENCOUNTER (OUTPATIENT)
Dept: HOSPITAL 92 - WCC | Age: 82
Discharge: HOME | End: 2017-12-07
Attending: FAMILY MEDICINE
Payer: MEDICARE

## 2017-12-07 DIAGNOSIS — I48.91: ICD-10-CM

## 2017-12-07 DIAGNOSIS — L97.519: ICD-10-CM

## 2017-12-07 DIAGNOSIS — G47.33: ICD-10-CM

## 2017-12-07 DIAGNOSIS — E11.621: Primary | ICD-10-CM

## 2017-12-07 DIAGNOSIS — M45.9: ICD-10-CM

## 2017-12-07 DIAGNOSIS — I10: ICD-10-CM

## 2017-12-07 DIAGNOSIS — I25.10: ICD-10-CM

## 2017-12-07 DIAGNOSIS — K21.9: ICD-10-CM

## 2017-12-07 PROCEDURE — 97598 DBRDMT OPN WND ADDL 20CM/<: CPT

## 2017-12-07 PROCEDURE — 87205 SMEAR GRAM STAIN: CPT

## 2017-12-07 PROCEDURE — 87070 CULTURE OTHR SPECIMN AEROBIC: CPT

## 2017-12-07 PROCEDURE — 97597 DBRDMT OPN WND 1ST 20 CM/<: CPT

## 2018-01-11 ENCOUNTER — HOSPITAL ENCOUNTER (OUTPATIENT)
Dept: HOSPITAL 92 - WCC | Age: 83
Discharge: HOME | End: 2018-01-11
Attending: FAMILY MEDICINE
Payer: MEDICARE

## 2018-01-11 DIAGNOSIS — M45.9: ICD-10-CM

## 2018-01-11 DIAGNOSIS — E11.621: Primary | ICD-10-CM

## 2018-01-11 DIAGNOSIS — I48.0: ICD-10-CM

## 2018-01-11 DIAGNOSIS — L97.519: ICD-10-CM

## 2018-01-11 DIAGNOSIS — I25.10: ICD-10-CM

## 2018-01-11 DIAGNOSIS — K21.9: ICD-10-CM

## 2018-01-11 DIAGNOSIS — I89.0: ICD-10-CM

## 2018-01-11 DIAGNOSIS — I10: ICD-10-CM

## 2018-01-11 DIAGNOSIS — G47.33: ICD-10-CM

## 2018-01-11 PROCEDURE — 36416 COLLJ CAPILLARY BLOOD SPEC: CPT

## 2018-01-11 PROCEDURE — 97602 WOUND(S) CARE NON-SELECTIVE: CPT

## 2018-01-11 NOTE — PRG
DATE OF SERVICE:  01/11/2018

 

HISTORY:  Mr. Martín Snider is a very pleasant 82-year-old gentleman, who presents to the Wound Ce
nter for evaluation of an ulceration of the right lateral foot.  The patient is again accompanied by 
his wife today.  The patient has been receiving dressing changes of Silverlon for the right lateral f
oot ulceration 3 times per week after cleansing and irrigation with the assistance of Home Health.  T
he patient states he has been utilizing his pneumatic pump for lymphedema of the lower extremities as
 previously prescribed.  The patient denies any fever or chills.

 

PHYSICAL EXAMINATION:

VITAL SIGNS:  Temperature 97.7, pulse 62, respirations 20, blood pressure 127/59.  Accu-Chek 82.

EXTREMITIES:  An ulceration of the right lateral foot is present, which measures approximately 6.0 x 
4.0 cm.  The dimensions of the wound at the time of the patient's visit on 12/07/2017 were approximat
ely 7.5 x 2.7 cm.  Granulation tissue is present within the wound margins.  Dry stable eschar is also
 present within the wound margins.  No purulent drainage is associated with the wound.  No erythema o
f the skin surrounding the wound is present.  No maceration of the skin of the periwound is noted.  L
ess edema of the right foot and lower leg is present on exam today than at the time of the patient's 
last visit.  A pedal pulse is not palpable on the right.

 

ASSESSMENT AND PLAN:

1.  Ulceration of right lateral foot as described above.  Dressing changes of Silverlon will be disco
ntinued.  Dressing changes of Santyl will be initiated today.  These dressing changes are to be perfo
rmed on a daily basis after cleansing and irrigation with the assistance of Home Health.  Arrangement
s will be made for the home delivery of Santyl, 4 x 4s, and ABD, Kerlix, and an Ace bandage will be u
tilized as secondary dressings.  The patient and his wife understand and are in agreement with the pr
eceding treatment plan.  I will see Mr. Snider again in four weeks.

2.  Lymphedema.  The patient is to begin utilizing his pneumatic pump on the right for 45 minutes eac
h day and on the left for 45 minutes each day.  Cardiology will also be notified that the patient sherly
l be utilizing his pneumatic pump on the right for 45 minutes and on the left for 45 minutes each day
.

3.  Coronary artery disease.

4.  Hypertension.

5.  Ankylosing spondylitis.

6.  Obstructive sleep apnea.

7.  History of paroxysmal atrial fibrillation.

8.  Gastroesophageal reflux disease.

9.  Diabetes mellitus.  The patient's Accu-Chek in clinic today is 82.  The patient has been reminded
 that for optimal wound healing.  His blood glucoses should remain below 150.

## 2018-01-28 ENCOUNTER — HOSPITAL ENCOUNTER (OUTPATIENT)
Dept: HOSPITAL 57 - BURRAD | Age: 83
Discharge: HOME | End: 2018-01-28
Payer: MEDICARE

## 2018-01-28 DIAGNOSIS — I48.0: ICD-10-CM

## 2018-01-28 DIAGNOSIS — I25.5: Primary | ICD-10-CM

## 2018-01-28 PROCEDURE — 71046 X-RAY EXAM CHEST 2 VIEWS: CPT

## 2018-01-28 NOTE — RAD
CHEST 2 VIEWS:

 

Date:  01/28/18 

 

Comparison made with the 10/06/17 study from [facility]. 

 

FINDINGS:

The heart is not enlarged. There are no congestive changes or pleural effusions. The lungs are clear.
 There has been no adverse change since the prior study. 

 

IMPRESSION: 

No acute findings.  

 

 

POS: HOME

## 2018-02-08 ENCOUNTER — HOSPITAL ENCOUNTER (OUTPATIENT)
Dept: HOSPITAL 92 - WCC | Age: 83
Discharge: HOME | End: 2018-02-08
Attending: FAMILY MEDICINE
Payer: MEDICARE

## 2018-02-08 DIAGNOSIS — I10: ICD-10-CM

## 2018-02-08 DIAGNOSIS — G47.33: ICD-10-CM

## 2018-02-08 DIAGNOSIS — K21.9: ICD-10-CM

## 2018-02-08 DIAGNOSIS — I25.10: ICD-10-CM

## 2018-02-08 DIAGNOSIS — M45.9: ICD-10-CM

## 2018-02-08 DIAGNOSIS — I89.0: ICD-10-CM

## 2018-02-08 DIAGNOSIS — L97.519: ICD-10-CM

## 2018-02-08 DIAGNOSIS — E11.621: Primary | ICD-10-CM

## 2018-02-08 DIAGNOSIS — I48.0: ICD-10-CM

## 2018-02-08 PROCEDURE — 36416 COLLJ CAPILLARY BLOOD SPEC: CPT

## 2018-02-08 PROCEDURE — 97602 WOUND(S) CARE NON-SELECTIVE: CPT

## 2018-02-08 NOTE — PRG
DATE OF SERVICE:  02/08/2018

 

HISTORY:  Mr. Martín Snider is a very pleasant 82-year-old gentleman who presents to the [facility] for evaluation of an ulceration of the right lateral foot.  The patient is again accompani
ed by his wife today.  The patient has been receiving dressing changes of Santyl for the right latera
l foot ulceration on a daily basis after cleansing and irrigation with the assistance of Home Health.
  The patient's wife states that Mr. Snider has been utilizing his pneumatic pump for lymphedema o
f the lower extremities.  She states that he utilizes his pneumatic pump for lymphedema of the right 
lower extremity and left lower extremity on a daily basis.  The patient denies any fever or chills.  
He has no complaints today.

 

PHYSICAL EXAMINATION:

VITAL SIGNS:  Temperature 97.5, pulse 69, respirations 17, blood pressure 164/70.  Accu-Chek 201.

EXTREMITIES:  An ulceration of the right lateral foot is present which measures approximately 6.6 x 3
.8 cm.  The dimensions of the wound at the time of the patient's visit on 01/11/2018 were approximate
ly 6.0 x 4.0 cm.  Little to no granulation tissue was visible within the wound margins.  No purulent 
drainage is associated with the wound.  No erythema of the skin surrounding the wound is present.  No
 maceration of the skin of the periwound is noted.  A pedal pulse is not palpable on the right.  Less
 edema of the right foot and lower leg is present on exam today than at the time of the patient's ini
tial presentation to the Wound Center.

 

ASSESSMENT AND PLAN:

1.  Ulceration of right lateral foot as described above.  Dressing changes of Santyl will be continue
d on a daily basis after cleansing and irrigation with the assistance of Home Health.  Arrangements w
ere previously made for the home delivery of Santyl, 4 x 4s, ABD, Kerlix, and an Ace bandage will be 
utilized as secondary dressings.  I will see Mr. Snider again in 4 weeks.

2.  Lymphedema.  The patient is to continue utilizing his pneumatic pump on the right for 45 minutes 
each day and on the left for 45 minutes each day.

3.  Coronary artery disease.

4.  Hypertension.

5.  Ankylosing spondylitis.

6.  Obstructive sleep apnea.

7.  History of paroxysmal atrial fibrillation.

8.  Gastroesophageal reflux disease.

9.  Diabetes mellitus.  The patient's Accu-Chek in clinic today is 201.  The patient has been reminde
d that for optimal wound healing, his blood glucoses should remain below 150.

## 2018-03-08 ENCOUNTER — HOSPITAL ENCOUNTER (OUTPATIENT)
Dept: HOSPITAL 92 - WCC | Age: 83
Discharge: HOME | End: 2018-03-08
Attending: FAMILY MEDICINE
Payer: MEDICARE

## 2018-03-08 DIAGNOSIS — K21.9: ICD-10-CM

## 2018-03-08 DIAGNOSIS — L97.519: ICD-10-CM

## 2018-03-08 DIAGNOSIS — I89.0: ICD-10-CM

## 2018-03-08 DIAGNOSIS — M45.9: ICD-10-CM

## 2018-03-08 DIAGNOSIS — Z86.79: ICD-10-CM

## 2018-03-08 DIAGNOSIS — G47.33: ICD-10-CM

## 2018-03-08 DIAGNOSIS — I25.10: ICD-10-CM

## 2018-03-08 DIAGNOSIS — E11.621: Primary | ICD-10-CM

## 2018-03-08 PROCEDURE — 36416 COLLJ CAPILLARY BLOOD SPEC: CPT

## 2018-03-08 PROCEDURE — 97602 WOUND(S) CARE NON-SELECTIVE: CPT

## 2018-03-08 NOTE — PRG
DATE OF SERVICE:  03/08/2018

 

HISTORY:  Mr. Martín Snider is a very pleasant 82-year-old gentleman, who presents to the Wound
 Center for evaluation of an ulceration of the right lateral foot.  The patient is again accompanied 
by his wife today.  The patient has been receiving dressing changes of Santyl for the right lateral f
oot ulceration on a daily basis after cleansing and irrigation with the assistance of Home Health and
 his wife.  The patient's wife again states that Mr. Snider has been utilizing his pneumatic pump 
for lymphedema of the right lower extremity.  He states that he utilizes his pneumatic pump for lymph
edema of the right lower extremity and left lower extremity on a daily basis.  The patient has no com
plaints today.  He denies any fever or chills.

 

PHYSICAL EXAMINATION:

VITAL SIGNS:  Temperature 97.4, pulse 65, respirations 18, blood pressure 130/60.  Accu-Chek 90.

EXTREMITIES:  An ulceration of the right lateral foot is present, which measures approximately 6.4 x 
3.5 cm.  The dimensions of the wound at the time of the patient's visit on 02/08/2018 were approximat
ely 6.6 x 3.8 cm.  Granulation tissue is visible only at the periphery of the wound.  No purulent sharri
inage is associated with the wound.  No cellulitis of the right foot is appreciated.  No maceration o
f the skin of the periwound is noted.  A pedal pulse is not palpable on the right.  Less edema of the
 right foot and lower leg is present on exam today than at the time of the patient's initial presenta
tion to the Wound Center.

 

ASSESSMENT AND PLAN:

1.  Ulceration of right lateral foot as described above.  Dressing changes of Santyl will be continue
d on a daily basis after cleansing and irrigation with the assistance of the patient's wife and Home 
Health.  A 4 x 4s, Kerlix, and an Ace bandage will be utilized as secondary dressings.  Arrangements 
will be made for the initiation of negative pressure therapy with dressing changes of the wound VAC 3
 times per week with the assistance of Home Health.  I will see Mr. Snider again in four weeks.

2.  Lymphedema.  The patient is to continue to utilize his pneumatic pump on the right for 45 minutes
 each day and on the left for 45 minutes each day.

3.  Coronary artery disease.

4.  Hypertension.

5.  Ankylosing spondylitis.

6.  Obstructive sleep apnea.

7.  History of paroxysmal atrial fibrillation.

8.  Gastroesophageal reflux disease.

9.  Diabetes mellitus.  The patient's Accu-Chek in clinic today is 90.  The patient has been reminded
 that for optimal wound healing, his blood glucoses should remain below 150.

## 2018-03-22 ENCOUNTER — HOSPITAL ENCOUNTER (OUTPATIENT)
Dept: HOSPITAL 92 - WCC | Age: 83
Discharge: HOME | End: 2018-03-22
Attending: FAMILY MEDICINE
Payer: MEDICARE

## 2018-03-22 DIAGNOSIS — E11.9: ICD-10-CM

## 2018-03-22 DIAGNOSIS — Z86.79: ICD-10-CM

## 2018-03-22 DIAGNOSIS — L97.519: Primary | ICD-10-CM

## 2018-03-22 DIAGNOSIS — I10: ICD-10-CM

## 2018-03-22 DIAGNOSIS — I25.10: ICD-10-CM

## 2018-03-22 DIAGNOSIS — M45.9: ICD-10-CM

## 2018-03-22 DIAGNOSIS — K21.9: ICD-10-CM

## 2018-03-22 DIAGNOSIS — I89.0: ICD-10-CM

## 2018-03-22 DIAGNOSIS — G47.33: ICD-10-CM

## 2018-03-22 PROCEDURE — 97605 NEG PRS WND THER DME<=50SQCM: CPT

## 2018-03-22 NOTE — PRG
DATE OF SERVICE:  03/22/2018

 

HISTORY:  Mr. Martín Snider is a very pleasant 82-year-old gentleman who presents to the Wound 
Center for evaluation of an ulceration of the right lateral foot.  The patient is again accompanied b
y his wife today.  The patient is now receiving negative pressure therapy with dressing changes of th
e wound VAC 3 times per week with the assistance of Home Health.  The patient is also utilizing a pne
umatic pump for lymphedema of the left lower extremity on a daily basis.  Mr. Snider has no compla
ints today.  He denies any fever or chills.

 

PHYSICAL EXAMINATION:

VITAL SIGNS:  Temperature 97.4, pulse 60, respirations 18, blood pressure 155/70.

EXTREMITIES:  An ulceration of the right lateral foot is present which measures approximately 5.7 x 3
.8 cm.  The dimensions of the wound at the time of the patient's visit on 03/08/2018 were approximate
ly 6.4 x 3.5 cm.  Granulation tissue is present within the wound margins.  No purulent drainage is as
sociated with the wound.  No cellulitis of the right foot is appreciated.  No maceration of the skin 
of the periwound is noted.

 

ASSESSMENT AND PLAN:

1.  Ulceration of right lateral foot as described above.  Negative pressure therapy will be continued
 with dressing changes of the wound VAC 3 times per week with the assistance of Home Health.  I will 
see Mr. Snider again in four weeks.

2.  Lymphedema.  The patient is to continue to utilize his pneumatic pump on the left for 45 minutes 
each day.

3.  Coronary artery disease.

4.  Hypertension.

5.  Ankylosing spondylitis.

6.  Obstructive sleep apnea.

7.  History of paroxysmal atrial fibrillation.

8.  Gastroesophageal reflux disease.

9.  Diabetes mellitus.  Accu-Cheks will be obtained at the time of the patient's clinic visits.  The 
patient has been reminded that for optimal wound healing, his blood glucoses should remain below 150.

## 2018-04-19 ENCOUNTER — HOSPITAL ENCOUNTER (OUTPATIENT)
Dept: HOSPITAL 92 - WCC | Age: 83
Discharge: HOME | End: 2018-04-19
Attending: FAMILY MEDICINE
Payer: MEDICARE

## 2018-04-19 DIAGNOSIS — E11.621: Primary | ICD-10-CM

## 2018-04-19 DIAGNOSIS — M45.9: ICD-10-CM

## 2018-04-19 DIAGNOSIS — I89.0: ICD-10-CM

## 2018-04-19 DIAGNOSIS — L97.519: ICD-10-CM

## 2018-04-19 DIAGNOSIS — K21.9: ICD-10-CM

## 2018-04-19 DIAGNOSIS — I25.10: ICD-10-CM

## 2018-04-19 DIAGNOSIS — I10: ICD-10-CM

## 2018-04-19 DIAGNOSIS — G47.33: ICD-10-CM

## 2018-04-19 DIAGNOSIS — I48.0: ICD-10-CM

## 2018-04-19 PROCEDURE — 36416 COLLJ CAPILLARY BLOOD SPEC: CPT

## 2018-04-19 PROCEDURE — A4218 STERILE SALINE OR WATER: HCPCS

## 2018-04-19 PROCEDURE — 97606 NEG PRS WND THER DME>50 SQCM: CPT

## 2018-04-19 NOTE — PRG
DATE OF SERVICE:  04/19/2018

 

HISTORY:  Mr. Martín Snider is a very pleasant 82-year-old gentleman who presents to the Wound 
Center for evaluation of an ulceration of the right lateral foot.  The patient is again accompanied b
y his wife today.  The patient is currently receiving negative pressure therapy with dressing changes
 of the wound VAC 3 times per week with the assistance of Home Health.  The patient is also utilizing
 a pneumatic pump for lymphedema of the left lower extremity on a daily basis.  The patient has no co
mplaints today.  He denies any fever or chills.

 

PHYSICAL EXAMINATION:

VITAL SIGNS:  Temperature 97.4, pulse 77, respirations 18, blood pressure 180/84.  Accu-Chek 253.

EXTREMITIES:  An ulceration of the right lateral foot is present which measures approximately 5.2 x 3
.3 cm.  The dimensions of the wound at the time of the patient's visit on 03/22/2018 were approximate
ly 5.7 x 3.8 cm.  Granulation tissue is present within the wound margins.  No purulent drainage is as
sociated with the wound.  No cellulitis of the right foot is appreciated.  No maceration of the skin 
of the periwound is noted.

 

ASSESSMENT AND PLAN:

1.  Ulceration of right lateral foot as described above.  Negative pressure therapy will be continued
 with dressing changes of the wound VAC 3 times per week with the assistance of Home Health.  I will 
see Mr. Snider again in two weeks.

2.  Lymphedema.  The patient is to continue to utilize his pneumatic pump on the left for 45 minutes 
each day.

3.  Coronary artery disease.

4.  Hypertension.

5.  Ankylosing spondylitis.

6.  Obstructive sleep apnea.

7.  History of paroxysmal atrial fibrillation.

8.  Gastroesophageal reflux disease.

9.  Diabetes mellitus.  The patient's Accu-Chek in clinic today is 253.  The patient has been reminde
d that for optimal wound healing, his blood glucoses should remain below 150.

## 2018-05-10 ENCOUNTER — HOSPITAL ENCOUNTER (OUTPATIENT)
Dept: HOSPITAL 92 - WCC | Age: 83
Discharge: HOME | End: 2018-05-10
Attending: FAMILY MEDICINE
Payer: MEDICARE

## 2018-05-10 DIAGNOSIS — G47.33: ICD-10-CM

## 2018-05-10 DIAGNOSIS — I25.10: ICD-10-CM

## 2018-05-10 DIAGNOSIS — I10: ICD-10-CM

## 2018-05-10 DIAGNOSIS — M45.9: ICD-10-CM

## 2018-05-10 DIAGNOSIS — I48.0: ICD-10-CM

## 2018-05-10 DIAGNOSIS — E11.621: Primary | ICD-10-CM

## 2018-05-10 DIAGNOSIS — K21.9: ICD-10-CM

## 2018-05-10 PROCEDURE — C5275 LOW COST SKIN SUBSTITUTE APP: HCPCS

## 2018-05-10 PROCEDURE — 36416 COLLJ CAPILLARY BLOOD SPEC: CPT

## 2018-05-10 PROCEDURE — 82962 GLUCOSE BLOOD TEST: CPT

## 2018-05-10 NOTE — PRG
DATE OF SERVICE:  05/10/2018

 

HISTORY:  Mr. Martín Snider is a very pleasant 82-year-old gentleman who presents to the Wound 
Center for evaluation of an ulceration of the right lateral foot.  The patient is again accompanied b
y his wife today.  The patient is presently receiving negative pressure therapy with dressing changes
 of the wound VAC 3 times per week with the assistance of Home Health.  The patient is also utilizing
 a pneumatic pump for lymphedema of the left lower extremity on a daily basis.  Mr. Snider has no 
complaints today.  He denies any fever or chills.

 

PHYSICAL EXAMINATION:

VITAL SIGNS:  Temperature 97.6, pulse 62, respirations 19, blood pressure 177/75.  Accu-Chek 161.

EXTREMITIES:  An ulceration of the right lateral foot is present which measures approximately 6.0 x 3
.5 cm.  The dimensions of the wound at the time of the patient's visit on 04/19/2018 were approximate
ly 5.2 x 3.3 cm.  Granulation tissue is present within the wound margins.  Necrotic and nonviable tis
tabatha present within the wound margins was debrided with an excisional full-thickness debridement with 
the use of scissors.  No purulent drainage is associated with the wound.  No cellulitis of the right 
foot is appreciated.  No maceration of the skin of the periwound is noted.  After copious irrigation 
of the wound bed with normal saline, MatriStem sheet 3 x 3.5 cm was applied to the wound bed of the u
lceration followed by Mepitel and the GranuFoam of the wound VAC.

 

ASSESSMENT AND PLAN:

1.  Ulceration of right lateral foot as described above.  Negative pressure therapy will be continued
 with dressing changes of the wound VAC 3 times per week with the assistance of Home Health.  I have 
explained to the patient that the next dressing change will be on 05/14/2018.  I will see Mr. Contreras
ht again in two weeks.

2.  Lymphedema.  The patient is to continue to utilize his pneumatic pump on the left for 45 minutes 
each day.

3.  Coronary artery disease.

4.  Hypertension.

5.  Ankylosing spondylitis.

6.  Obstructive sleep apnea.

7.  History of paroxysmal atrial fibrillation.

8.  Gastroesophageal reflux disease.

9.  Diabetes mellitus.  The patient's Accu-Chek in clinic today is 161.  The patient has been reminde
d that for optimal wound healing, his blood glucoses should remain below 150.

## 2018-06-11 ENCOUNTER — HOSPITAL ENCOUNTER (OUTPATIENT)
Dept: HOSPITAL 92 - WCC | Age: 83
Discharge: HOME | End: 2018-06-11
Attending: FAMILY MEDICINE
Payer: MEDICARE

## 2018-06-11 DIAGNOSIS — I25.10: ICD-10-CM

## 2018-06-11 DIAGNOSIS — Z86.79: ICD-10-CM

## 2018-06-11 DIAGNOSIS — I10: ICD-10-CM

## 2018-06-11 DIAGNOSIS — L97.519: ICD-10-CM

## 2018-06-11 DIAGNOSIS — K21.9: ICD-10-CM

## 2018-06-11 DIAGNOSIS — I89.0: ICD-10-CM

## 2018-06-11 DIAGNOSIS — M45.9: ICD-10-CM

## 2018-06-11 DIAGNOSIS — G47.33: ICD-10-CM

## 2018-06-11 DIAGNOSIS — E11.621: Primary | ICD-10-CM

## 2018-06-11 PROCEDURE — 36416 COLLJ CAPILLARY BLOOD SPEC: CPT

## 2018-06-11 PROCEDURE — A4218 STERILE SALINE OR WATER: HCPCS

## 2018-06-11 PROCEDURE — C5275 LOW COST SKIN SUBSTITUTE APP: HCPCS

## 2018-06-11 PROCEDURE — 82962 GLUCOSE BLOOD TEST: CPT

## 2018-06-11 NOTE — PRG
DATE OF SERVICE:  06/11/2018

 

HISTORY:  Mr. Martín Snider is a very pleasant 83-year-old gentleman who [facility] for evaluation of an ulceration of the right lateral foot.  The patient is again accompanied b
y his wife today.  The patient is currently receiving negative pressure therapy with dressing changes
 of the wound VAC 3 times per week with the assistance of Home Health.  The patient is also utilizing
 a pneumatic pump for lymphedema of the left lower extremity on a daily basis.  The patient has no co
mplaints today.  He denies any fever or chills.

 

PHYSICAL EXAMINATION:

VITAL SIGNS:  Temperature 97.4, pulse 59, respirations 20, blood pressure 125/60.  Accu-Chek 138.

EXTREMITIES:  An ulceration of the right lateral foot is present which measures approximately 5.2 x 2
.5 cm.  The dimensions of the wound at the time of the patient's visit on 05/10/2018 were approximate
ly 6.0 x 3.5 cm.  Granulation tissue is present within the wound margins.  No purulent drainage is as
sociated with the wound.  No cellulitis of the right foot is appreciated.  No maceration of the skin 
of the periwound is noted.  After copious irrigation of the wound bed with normal saline, MatriStem s
heet 3 x 3.5 cm was applied to the wound bed of the ulceration followed by Mepitel and the GranuFoam 
of the wound VAC.

 

ASSESSMENT AND PLAN:

1.  Ulceration of right lateral foot as described above.  Negative pressure therapy will be continued
 with dressing changes of the wound VAC 3 times per week with the assistance of Home Health.  I have 
explained to the patient that the next dressing change will be on 06/15/2018.  I will see Mr. Contreras
ht again in 2 weeks.

2.  Lymphedema.  The patient is to continue to utilize his pneumatic pump on the left for 45 minutes 
each day.

3.  Coronary artery disease.

4.  Hypertension.

5.  Ankylosing spondylitis.

6.  Obstructive sleep apnea.

7.  History of paroxysmal atrial fibrillation.

8.  Gastroesophageal reflux disease.

9.  Diabetes mellitus.  The patient's Accu-Chek in clinic today is 138.  The patient has been reminde
d that for optimal wound healing, his blood glucoses should remain below 150.

## 2018-06-25 ENCOUNTER — HOSPITAL ENCOUNTER (OUTPATIENT)
Dept: HOSPITAL 92 - WCC | Age: 83
Discharge: HOME | End: 2018-06-25
Attending: FAMILY MEDICINE
Payer: MEDICARE

## 2018-06-25 DIAGNOSIS — K21.9: ICD-10-CM

## 2018-06-25 DIAGNOSIS — E11.621: Primary | ICD-10-CM

## 2018-06-25 DIAGNOSIS — I25.10: ICD-10-CM

## 2018-06-25 DIAGNOSIS — G47.33: ICD-10-CM

## 2018-06-25 DIAGNOSIS — I48.0: ICD-10-CM

## 2018-06-25 DIAGNOSIS — L97.529: ICD-10-CM

## 2018-06-25 DIAGNOSIS — I89.0: ICD-10-CM

## 2018-06-25 DIAGNOSIS — I10: ICD-10-CM

## 2018-06-25 DIAGNOSIS — M45.9: ICD-10-CM

## 2018-06-25 PROCEDURE — C5275 LOW COST SKIN SUBSTITUTE APP: HCPCS

## 2018-06-25 PROCEDURE — 36416 COLLJ CAPILLARY BLOOD SPEC: CPT

## 2018-06-25 PROCEDURE — 82962 GLUCOSE BLOOD TEST: CPT

## 2018-06-25 NOTE — PRG
DATE OF SERVICE:  06/25/2018

 

HISTORY:  Mr. Martín Snider is a very pleasant 83-year-old gentleman who presents to the [facility] for evaluation of an ulceration of the right lateral foot.  The patient is again accompanied b
y his wife today.  Presently, the patient is receiving negative pressure therapy with dressing change
s of the wound VAC 3 times per week with the assistance of Home Health.  The patient is also utilizin
g a pneumatic pump for lymphedema of the left lower extremity on a daily basis.  Mr. Snider has no
 complaints today.  He denies any fever or chills.  In conjunction with the wound VAC, the patient is
 receiving treatment with MatriStem sheet.

 

PHYSICAL EXAMINATION:

VITAL SIGNS:  Temperature 97.6, pulse 59, respirations 17, blood pressure 141/63.  Accu-Chek 153.

EXTREMITIES:  An ulceration of the right lateral foot is present which measures approximately 5.2 x 2
.7 cm.  The dimensions of the wound at the time of the patient's visit on 06/11/2018 were approximate
ly 5.2 x 2.5 cm.  Granulation tissue is present within the wound margins.  No purulent drainage is as
sociated with the wound.  No cellulitis of the right foot is present.  No maceration of the skin of t
he periwound is noted.  After copious irrigation of the wound bed with normal saline, MatriStem sheet
 3 x 3.5 cm was applied to the wound bed of the ulceration followed by Mepitel and the GranuFoam of t
he wound VAC.

 

ASSESSMENT AND PLAN:

1.  Ulceration of right lateral foot as described above.  Negative pressure therapy will be continued
 with dressing changes of the wound VAC 3 times per week with the assistance of Home Health.  I have 
explained to the patient that the next dressing change will be on 06/29/2018.  I will see Mr. Contreras
ht again in two weeks.  At this time, consideration will be given to another placement of MatriStem.

2.  Lymphedema.  The patient is to continue to utilize his pneumatic pump on the left for 45 minutes 
each day.

3.  Coronary artery disease.

4.  Hypertension.

5.  Ankylosing spondylitis.

6.  Obstructive sleep apnea.

7.  History of paroxysmal atrial fibrillation.

8.  Gastroesophageal reflux disease.

9.  Diabetes mellitus.  The patient's Accu-Chek in clinic today is 153.  The patient has been reminde
d that for optimal wound healing, his blood glucoses should remain below 150.

## 2018-07-09 ENCOUNTER — HOSPITAL ENCOUNTER (OUTPATIENT)
Dept: HOSPITAL 92 - WCC | Age: 83
Discharge: HOME | End: 2018-07-09
Attending: FAMILY MEDICINE
Payer: MEDICARE

## 2018-07-09 DIAGNOSIS — M45.9: ICD-10-CM

## 2018-07-09 DIAGNOSIS — E11.621: Primary | ICD-10-CM

## 2018-07-09 DIAGNOSIS — I89.0: ICD-10-CM

## 2018-07-09 DIAGNOSIS — Z86.79: ICD-10-CM

## 2018-07-09 DIAGNOSIS — I25.10: ICD-10-CM

## 2018-07-09 DIAGNOSIS — K21.9: ICD-10-CM

## 2018-07-09 DIAGNOSIS — G47.33: ICD-10-CM

## 2018-07-09 DIAGNOSIS — L97.519: ICD-10-CM

## 2018-07-09 DIAGNOSIS — I10: ICD-10-CM

## 2018-07-09 PROCEDURE — 36416 COLLJ CAPILLARY BLOOD SPEC: CPT

## 2018-07-09 PROCEDURE — A4218 STERILE SALINE OR WATER: HCPCS

## 2018-07-09 NOTE — PRG
DATE OF SERVICE:  07/09/2018

 

HISTORY:  Mr. Martín Snider is a very pleasant 83-year-old gentleman accompanied by his wife nabeel carrion presents to the [facility] for evaluation of an ulceration of the right lateral foot.  Currently,
 the patient is receiving negative pressure therapy in conjunction with MatriStem placement for treat
ment of the ulceration of the right lateral foot.  The patient is also utilizing a pneumatic pump for
 lymphedema of the left lower extremity on a daily basis.  The patient has no complaints today.  He d
enies any fever or chills.

 

PHYSICAL EXAMINATION:

VITAL SIGNS:  Temperature 97.7, pulse 60, respirations 19, blood pressure 197/77.  Accu-Chek 227.

EXTREMITIES:  An ulceration of the right lateral foot is present which measures approximately 5.1 x 2
.4 cm.  The dimensions of the wound at the time of the patient's visit on 06/25/2018 were approximate
ly 5.2 x 2.7 cm.  Granulation tissue is present within the wound margins.  No purulent drainage is as
sociated with the wound.  No cellulitis of the right foot is present.  No maceration of the skin of t
he periwound is noted.  After copious irrigation of the wound bed with normal saline, MatriStem sheet
 3 x 3.5 cm was applied to the wound bed of the ulceration followed by Adaptic touch and the GranuFoa
m of the wound VAC.

 

ASSESSMENT AND PLAN:

1.  Ulceration of right lateral foot as described above.  Negative pressure therapy will be continued
 with dressing changes of the wound VAC 3 times per week with the assistance of Home Health.  I have 
explained to the patient that the next dressing change will be on 07/13/2018.  I will see Mr. Contreras
ht again in 2 weeks.  At this time, consideration will be given to another placement of MatriStem.

2.  Lymphedema.  The patient is to continue to utilize his pneumatic pump on the left for 45 minutes 
each day.

3.  Coronary artery disease.

4.  Hypertension.

5.  Ankylosing spondylitis.

6.  Obstructive sleep apnea.

7.  History of paroxysmal atrial fibrillation.

8.  Gastroesophageal reflux disease.

9.  Diabetes mellitus.  The patient's Accu-Chek in clinic today is 227.  The patient has been reminde
d that for optimal wound healing, his blood glucoses should remain below 150.

## 2018-07-12 ENCOUNTER — HOSPITAL ENCOUNTER (OUTPATIENT)
Dept: HOSPITAL 57 - BURRAD | Age: 83
Discharge: HOME | End: 2018-07-12
Attending: FAMILY MEDICINE
Payer: MEDICARE

## 2018-07-12 DIAGNOSIS — R05: Primary | ICD-10-CM

## 2018-07-12 PROCEDURE — 71046 X-RAY EXAM CHEST 2 VIEWS: CPT

## 2018-07-12 NOTE — RAD
CHEST TWO VIEWS:

7/12/18

 

Comparison is made with the prior study of 1/28/18. 

 

The heart is normal in size and the lungs are clear. No infiltrate or effusion was seen. The lungs ar
e hyperexpanded as usual. There is no sign of pneumonia. The trachea is midline. 

 

IMPRESSION:  

No acute thoracic finding.

 

POS: HOME

## 2018-07-23 ENCOUNTER — HOSPITAL ENCOUNTER (OUTPATIENT)
Dept: HOSPITAL 92 - WCC | Age: 83
Discharge: HOME | End: 2018-07-23
Attending: FAMILY MEDICINE
Payer: MEDICARE

## 2018-07-23 DIAGNOSIS — E11.621: Primary | ICD-10-CM

## 2018-07-23 DIAGNOSIS — G47.33: ICD-10-CM

## 2018-07-23 DIAGNOSIS — I89.0: ICD-10-CM

## 2018-07-23 DIAGNOSIS — K21.9: ICD-10-CM

## 2018-07-23 DIAGNOSIS — L97.519: ICD-10-CM

## 2018-07-23 DIAGNOSIS — I25.10: ICD-10-CM

## 2018-07-23 DIAGNOSIS — Z86.79: ICD-10-CM

## 2018-07-23 DIAGNOSIS — M45.9: ICD-10-CM

## 2018-07-23 DIAGNOSIS — I10: ICD-10-CM

## 2018-07-23 PROCEDURE — A4218 STERILE SALINE OR WATER: HCPCS

## 2018-07-23 NOTE — PRG
DATE OF SERVICE:  07/23/2018

 

HISTORY:  Mr. Martín Snider is a very pleasant 83-year-old gentleman accompanied by his wife naebel carrion presents to the [facility] for evaluation of an ulceration of the right lateral foot.  Presently,
 the patient is receiving negative pressure therapy in conjunction with MatriStem placement for treat
ment of the ulceration of the right lateral foot.  The patient is also utilizing a pneumatic pump for
 lymphedema of the left lower extremity on a daily basis.  Mr. Snider has no complaints today.  He
 denies any fever or chills.

 

PHYSICAL EXAMINATION:

VITAL SIGNS:  Temperature 97.8, pulse 62, respirations 18, blood pressure 161/70.  Accu-Chek 134.

EXTREMITIES:  An ulceration of the right lateral foot is present, which measures approximately 5.4 x 
2.3 cm.  The dimensions of the wound at the time of the patient's visit on 07/09/2018 were approximat
ely 5.1 x 2.4 cm.  Granulation tissue is present within the wound margins.  No purulent drainage is a
ssociated with the wound.  No cellulitis of the right foot is present.  No maceration of the skin of 
the periwound is noted.  After copious irrigation of the wound bed with normal saline, MatriStem shee
t 3 x 3.5 cm was applied to the wound bed of the ulceration followed by Adaptic touch and the GranuFo
am of the wound VAC.

 

ASSESSMENT AND PLAN:

1.  Ulceration of right lateral foot as described above.  Negative pressure therapy will be continued
 with dressing changes of the wound VAC 3 times per week with the assistance of Home Health.  I have 
explained to the patient that the next dressing change will be on 07/27/2018.  I will see Mr. Contreras
ht again in four weeks.

2.  Lymphedema.  The patient is to continue to utilize his pneumatic pump on the left for 45 minutes 
each day.

3.  Coronary artery disease.

4.  Hypertension.

5.  Ankylosing spondylitis.

6.  Obstructive sleep apnea.

7.  History of paroxysmal atrial fibrillation.

8.  Gastroesophageal reflux disease.

9.  Diabetes mellitus.  The patient's Accu-Chek in clinic today is 134.  The patient has been reminde
d that for optimal wound healing, his blood glucoses should remain below 150.

## 2018-08-21 ENCOUNTER — HOSPITAL ENCOUNTER (INPATIENT)
Dept: HOSPITAL 92 - ERS | Age: 83
LOS: 4 days | Discharge: HOME | DRG: 291 | End: 2018-08-25
Attending: INTERNAL MEDICINE | Admitting: INTERNAL MEDICINE
Payer: MEDICARE

## 2018-08-21 VITALS — BODY MASS INDEX: 40.3 KG/M2

## 2018-08-21 DIAGNOSIS — I87.8: ICD-10-CM

## 2018-08-21 DIAGNOSIS — R06.03: ICD-10-CM

## 2018-08-21 DIAGNOSIS — I13.0: Primary | ICD-10-CM

## 2018-08-21 DIAGNOSIS — D64.9: ICD-10-CM

## 2018-08-21 DIAGNOSIS — I50.33: ICD-10-CM

## 2018-08-21 DIAGNOSIS — K21.9: ICD-10-CM

## 2018-08-21 DIAGNOSIS — M45.9: ICD-10-CM

## 2018-08-21 DIAGNOSIS — E78.5: ICD-10-CM

## 2018-08-21 DIAGNOSIS — Z66: ICD-10-CM

## 2018-08-21 DIAGNOSIS — E11.22: ICD-10-CM

## 2018-08-21 DIAGNOSIS — J18.9: ICD-10-CM

## 2018-08-21 DIAGNOSIS — I82.4Z2: ICD-10-CM

## 2018-08-21 DIAGNOSIS — I48.2: ICD-10-CM

## 2018-08-21 DIAGNOSIS — I25.10: ICD-10-CM

## 2018-08-21 DIAGNOSIS — E87.6: ICD-10-CM

## 2018-08-21 DIAGNOSIS — G47.33: ICD-10-CM

## 2018-08-21 DIAGNOSIS — N18.3: ICD-10-CM

## 2018-08-21 DIAGNOSIS — E66.01: ICD-10-CM

## 2018-08-21 LAB
ALBUMIN SERPL BCG-MCNC: 3.6 G/DL (ref 3.4–4.8)
ALP SERPL-CCNC: 71 U/L (ref 40–150)
ALT SERPL W P-5'-P-CCNC: 18 U/L (ref 8–55)
ANION GAP SERPL CALC-SCNC: 12 MMOL/L (ref 10–20)
AST SERPL-CCNC: 19 U/L (ref 5–34)
BASOPHILS # BLD AUTO: 0 THOU/UL (ref 0–0.2)
BASOPHILS NFR BLD AUTO: 0.7 % (ref 0–1)
BILIRUB SERPL-MCNC: 0.5 MG/DL (ref 0.2–1.2)
BUN SERPL-MCNC: 26 MG/DL (ref 8.4–25.7)
CALCIUM SERPL-MCNC: 8.8 MG/DL (ref 7.8–10.44)
CHLORIDE SERPL-SCNC: 103 MMOL/L (ref 98–107)
CK MB SERPL-MCNC: 1.4 NG/ML (ref 0–6.6)
CK SERPL-CCNC: 59 U/L (ref 30–200)
CO2 SERPL-SCNC: 26 MMOL/L (ref 23–31)
CREAT CL PREDICTED SERPL C-G-VRATE: 0 ML/MIN (ref 70–130)
EOSINOPHIL # BLD AUTO: 0.2 THOU/UL (ref 0–0.7)
EOSINOPHIL NFR BLD AUTO: 2.7 % (ref 0–10)
GLOBULIN SER CALC-MCNC: 2.8 G/DL (ref 2.4–3.5)
GLUCOSE SERPL-MCNC: 209 MG/DL (ref 83–110)
HGB BLD-MCNC: 10.7 G/DL (ref 14–18)
LIPASE SERPL-CCNC: 17 U/L (ref 8–78)
LYMPHOCYTES # BLD: 1.7 THOU/UL (ref 1.2–3.4)
LYMPHOCYTES NFR BLD AUTO: 24.8 % (ref 21–51)
MCH RBC QN AUTO: 32.6 PG (ref 27–31)
MCV RBC AUTO: 96.5 FL (ref 78–98)
MONOCYTES # BLD AUTO: 0.6 THOU/UL (ref 0.11–0.59)
MONOCYTES NFR BLD AUTO: 9.1 % (ref 0–10)
NEUTROPHILS # BLD AUTO: 4.3 THOU/UL (ref 1.4–6.5)
NEUTROPHILS NFR BLD AUTO: 62.7 % (ref 42–75)
PLATELET # BLD AUTO: 137 THOU/UL (ref 130–400)
POTASSIUM SERPL-SCNC: 4.4 MMOL/L (ref 3.5–5.1)
RBC # BLD AUTO: 3.29 MILL/UL (ref 4.7–6.1)
SODIUM SERPL-SCNC: 137 MMOL/L (ref 136–145)
SP GR UR STRIP: 1.01 (ref 1–1.04)
TROPONIN I SERPL DL<=0.01 NG/ML-MCNC: (no result) NG/ML (ref ?–0.03)
WBC # BLD AUTO: 6.9 THOU/UL (ref 4.8–10.8)

## 2018-08-21 PROCEDURE — 36415 COLL VENOUS BLD VENIPUNCTURE: CPT

## 2018-08-21 PROCEDURE — 80048 BASIC METABOLIC PNL TOTAL CA: CPT

## 2018-08-21 PROCEDURE — 90670 PCV13 VACCINE IM: CPT

## 2018-08-21 PROCEDURE — 93005 ELECTROCARDIOGRAM TRACING: CPT

## 2018-08-21 PROCEDURE — G0009 ADMIN PNEUMOCOCCAL VACCINE: HCPCS

## 2018-08-21 PROCEDURE — 93798 PHYS/QHP OP CAR RHAB W/ECG: CPT

## 2018-08-21 PROCEDURE — 83880 ASSAY OF NATRIURETIC PEPTIDE: CPT

## 2018-08-21 PROCEDURE — 83690 ASSAY OF LIPASE: CPT

## 2018-08-21 PROCEDURE — 94640 AIRWAY INHALATION TREATMENT: CPT

## 2018-08-21 PROCEDURE — 85025 COMPLETE CBC W/AUTO DIFF WBC: CPT

## 2018-08-21 PROCEDURE — 96375 TX/PRO/DX INJ NEW DRUG ADDON: CPT

## 2018-08-21 PROCEDURE — 71045 X-RAY EXAM CHEST 1 VIEW: CPT

## 2018-08-21 PROCEDURE — 83735 ASSAY OF MAGNESIUM: CPT

## 2018-08-21 PROCEDURE — 82553 CREATINE MB FRACTION: CPT

## 2018-08-21 PROCEDURE — 84484 ASSAY OF TROPONIN QUANT: CPT

## 2018-08-21 PROCEDURE — 36416 COLLJ CAPILLARY BLOOD SPEC: CPT

## 2018-08-21 PROCEDURE — 81003 URINALYSIS AUTO W/O SCOPE: CPT

## 2018-08-21 PROCEDURE — 80053 COMPREHEN METABOLIC PANEL: CPT

## 2018-08-21 PROCEDURE — 90471 IMMUNIZATION ADMIN: CPT

## 2018-08-21 PROCEDURE — 96365 THER/PROPH/DIAG IV INF INIT: CPT

## 2018-08-21 PROCEDURE — 87040 BLOOD CULTURE FOR BACTERIA: CPT

## 2018-08-21 PROCEDURE — 93970 EXTREMITY STUDY: CPT

## 2018-08-21 PROCEDURE — A4216 STERILE WATER/SALINE, 10 ML: HCPCS

## 2018-08-21 PROCEDURE — 83605 ASSAY OF LACTIC ACID: CPT

## 2018-08-21 NOTE — RAD
CHEST ONE VIEW:

8/21/18

 

HISTORY: 

Shortness of breath. Dyspnea.

 

COMPARISON:  

Chest radiograph 7/12/18.

 

FINDINGS:  

There is a new right basilar air space opacity. Right costophrenic sulcus appears to be blunted. No p
neumothorax. Mild pulmonary venous congestion. There is similar appearance to the posterior spinal fu
gabriela hardware. Moderate degenerative changes of both glenohumeral joints and acromioclavicular joints
. 

 

IMPRESSION:  

Findings concerning for developing right lower lobe pneumonia. Followup after treatment is recommende
d.

 

POS: [facility]

## 2018-08-22 LAB
TROPONIN I SERPL DL<=0.01 NG/ML-MCNC: 0.01 NG/ML (ref ?–0.03)
TROPONIN I SERPL DL<=0.01 NG/ML-MCNC: 0.01 NG/ML (ref ?–0.03)

## 2018-08-22 RX ADMIN — CEFTRIAXONE SCH MLS: 2 INJECTION, POWDER, FOR SOLUTION INTRAMUSCULAR; INTRAVENOUS at 04:57

## 2018-08-22 RX ADMIN — INSULIN GLARGINE SCH MLS: 100 INJECTION, SOLUTION SUBCUTANEOUS at 10:10

## 2018-08-22 RX ADMIN — INSULIN HUMAN PRN UNIT: 100 INJECTION, SOLUTION PARENTERAL at 10:09

## 2018-08-22 RX ADMIN — ASPIRIN SCH MG: 81 TABLET ORAL at 09:51

## 2018-08-22 RX ADMIN — INSULIN HUMAN PRN UNIT: 100 INJECTION, SOLUTION PARENTERAL at 12:01

## 2018-08-22 NOTE — CON
DATE OF CONSULTATION:  08/22/2018

 

DATE OF ADMISSION:  08/21/2018

 

CARDIOLOGY CONSULT NOTE

 

INDICATION FOR CONSULTATION:  An 83-year-old patient with congestive heart failure exacerbation.

 

HISTORY OF PRESENT ILLNESS:  This is very unfortunate 83-year-old gentleman who is essentially bedrid
den or wheelchair ridden due to history of ankylosing spondylitis, has a history of coronary artery d
isease in the past.  He has undergone angioplasty and stent placements x3 at [facility] in [place] un
certain as exactly, which vessels were underwent angioplasty and stent placement.  He was told that h
e most likely had needed to have more stents, but they were unable to place more than 3 stents.  He h
as been turned down, apparently as a patient for bypass surgery due to his severe ankylosing spondyli
tis of the spine and sternum.  He had been doing relatively well, but since he has had actually with 
his ankylosing spondylitis, he apparently had also fallen at some point in time off his porch and the
n became bedridden at that time and has had more problems with urinary and GI problems.  He apparentl
y has had occasional urinary tract infections and recently had another urinary tract infection, was p
laced on medications, and then spoke to the pharmacist and apparently the pharmacist told him to decr
ease his Lasix, so that the antibiotics would work more efficiently, so he decreased his Lasix and th
en he developed more lower extremity edema, abdominal bloating, and increasing shortness of breath.  
He does have a history of diastolic dysfunction.  The systolic function has been normal in the past. 
 At this time, he has had some diuresis and is feeling somewhat better, but he still appears to be dr
inking a significant amount of fluid, which is sitting at the bedside, but otherwise appears to be do
ing better.  He has no complaints of chest pain.  He does have a history of atrial fibrillation and h
as been on aspirin in the past.  I believe due to possible problems with increased possibility of ble
eding problems or falls, he has been only on aspirin and not on any other significant anticoagulation
.  I did not see another indication for that.  Despite he still remains in his atrial fibrillation.  
Actually, he does not appear on EKG to have atrial fibrillation on initial EKG.  He does have sinus r
hythm with an occasional PAC.  No indication of significant ischemia.  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
art block.  At this time, he is certainly more comfortable than on his admission.  He did have an ech
ocardiogram, most recently in the office, I believe in January of this year.  I do not see more recen
t one in the office, I believe his last echocardiogram was according to the records in 10/2017, which
 showed ejection fraction of 50%-55%.  His last cardiac catheterization by Dr. Landers was in 2012 a
nd that showed significant stenosis in the right coronary artery, obtuse marginal branch of the left 
circumflex and in the diagonal branches as well as the left anterior descending artery.

 

PAST MEDICAL HISTORY:  Rather extensive.  He has a history of coronary artery disease, angioplasty an
d stent placement.  He has had history of diastolic dysfunction.  He has obstructive sleep apnea.  He
 wears a CPAP mask usually.  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  He is wheelch
air or bedridden.  He has type 2 diabetes insulin-dependent.  He has ankylosing spondylitis.  He has 
had a spinal cord injury after a fall.  He has had some history in the past of nonsustained ventricul
ar tachycardia.  He has had a history of GI bleeding in the past and this is why he is not able take 
the oral anticoagulation except for an aspirin.  He had been on medication in the past, I believe thi
s was stopped after the GI bleed.  He has had some episodes of atrial fibrillation, but at this time 
remains in sinus rhythm.  He has dyslipidemia and obesity.  There is a history of a loop recorder shahnaz
ng placed in the past as well as EGD and colonoscopy.  He has had a finger reattachment.  He has had 
a hemorrhoidectomy.

 

ALLERGIES:  He is allergic to multiple medications, which include SULFA and VALIUM.

 

MEDICATIONS:  His list of medications prior to admission is rather extensive also, these include metf
ormin, fluoxetine, terazosin, Lantus insulin, Nasonex, zinc tablets, hydrocodone/acetaminophen, devang
us sulfate, hydrocortisone cream, gabapentin, glipizide, mupirocin cream, silver sulfadiazine, ketaco
nazole cream, multivitamins, vitamin A, vitamin C, calcium plus vitamin D, vitamin B complex, pantopr
azole, aspirin 81 mg a day, ranitidine, Humulin R injections for insulin, fish oil, CoQ10.  He also t
fabio cyanocobalamin, folic acid, furosemide was 20 mg b.i.d., Lipitor 80 mg q.p.m., Coreg 6.25 mg b.i
.d., Multaq 400 mg b.i.d.  He was taken also BiDil 1 tablet 3 times a day.

 

REVIEW OF SYSTEMS:  He denied any HEENT complaints.  He does have some poor dentition.  He has had so
me shortness of breath, pulmonary standpoint.  GI:  He has had some bloating.  He has had some proble
ms with constipation in the past.  Genitourinary:  He has occasional urinary tract infections.  Muscu
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  He has had some problems wh
ile walking.  He has lower extremity edema.  He has recently had a large callus on the right side of 
his foot, which apparently was sloughed off and now he had an underlying ulceration, which has been t
reated by the wound care and appears to have gotten better.  Neurologically, he did not have any harkins
ges.  He has had some injuries from the spine as well as his ankylosing spondylitis, which prevents h
im from ambulating very much at all.  He pretty much stays in the wheelchair or the bed.

 

PHYSICAL EXAMINATION:

GENERAL:  Reveals an elderly, obese, deconditioned gentleman who does not appear to be in any distres
s at this time.

VITAL SIGNS:  His blood pressure is 138/65, heart rate is 62 and regular with occasional ectopy, resp
iratory rate is 18.  He is afebrile.

HEENT:  Reveals the head to be normocephalic and atraumatic.  He has bilateral carotid bruits.

NECK:  I cannot see any JVD, but he does have a large neck.

CHEST:  I did not hear any rales, rhonchi, or wheezing at this time; however, breath sounds are somew
hat decreased throughout.

CARDIOVASCULAR:  Otherwise unremarkable.

ABDOMEN:  Shows obesity, he is somewhat tympanic.  He does have some bowel sounds and somewhat hypoac
tive.

EXTREMITIES:  Showed 2+ lower extremity edema.  He has surgical type shoes on the feet that they were
 not removed to palpate pulses.

NEUROLOGIC:  The patient was unable to get out of bed for further evaluation, but mentation wise appe
ars to be within normal limits.

 

LABORATORY AND DATA:  Shows a creatinine of 1.26 with a BUN of 26, potassium 4.4, blood sugar was 156
-234.  Hemoglobin is 10.9.

 

IMPRESSION:

1.  Congestive heart failure exacerbation with chronic diastolic dysfunction.  This is acute on chron
ic due to long history of diastolic dysfunction.  He apparently has a normal systolic function.  At t
his time, would agree with the management by continue to diurese the patient gently.  We will continu
e his other home medications as listed above.

2.  Diabetes.  This is under somewhat poorly controlled and will be dealt with by the primary care se james.

3.  Chronic anemia would just suggest he continue his medications.  He may need to be placed on iron 
supplements.  There is no indication of any active bleeding at this time.

4.  History of chronic sleep apnea.  The patient will need to CPAP mask, he does not have one here ne
ed to be supplied.

5.  Coronary artery disease and this appears to be stable at this time.  I do not see an indication h
e has any ischemia.  Enzymes have remained negative.  He denied any chest pain.

6.  Hypertension.  This is under good control at this time.  We will continue his medications.

7.  History of atrial fibrillation.  He has been taking Multaq and this medicine has been continued. 
 For this, I would just continue to follow the patient.  I suspect once he is diuresed then he went b
ack to his baseline and can be discharged to home in the future would ask him to continue his diureti
cs.  His I's and O's are inaccurate as the patient has been voiding in the diaper and cannot determin
e whether or not he is actually losing very much volume or not.

## 2018-08-22 NOTE — HP
DATE OF ADMISSION:  08/21/2018

 

PRIMARY CARE PHYSICIAN:  Porfirio Donaldson M.D.

 

PRIMARY CARDIOLOGIST:  Aman Landers M.D.

 

PRIMARY WOUND CARE:  Kellen Adorno M.D.

 

CHIEF COMPLAINT:  Cough, shortness of breath along with chills of 1 day duration.

 

HISTORY OF PRESENT ILLNESS:  The patient is an 83-year-old male with diabetes mellitus type 2, hypert
ension, and chronic diastolic heart failure, who presented to the emergency room with above complaint
s.

 

Over the last 1-2 days, the patient has on and off cough which is productive of thick whitish phlegm.
  He also felt generally weak and fatigued.  He was short of breath on minimal exertion.  He tried us
ing nebulizer treatment without much help.  He later started developing fever along with chills.  He 
did not record his temperature.  He was also recently diagnosed with UTI and is currently on Bactrim.


 

PAST MEDICAL HISTORY:

1.  Chronic diastolic heart failure.

2.  Obstructive sleep apnea on CPAP.

3.  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  The patient is wheelchair dep
endent.

4.  Diabetes mellitus type 2.

5.  Coronary artery disease, status post stent placement.

6.  History of nonsustained ventricular tachycardia.

7.  History of ankylosing spondylitis.

8.  History of spinal cord injury.

9.  History of GI bleeding that required discontinuation of Eliquis.

10.  Paroxysmal atrial fibrillation.

11.  Hypertension.

12.  Dyslipidemia.

13.  Obesity.

 

PAST SURGICAL HISTORY:

1.  Loop recorder placement last year.

2.  EGD and colonoscopy.

3.  Hemorrhoidectomy.

4.  Cardiac catheterization.

5.  Finger reattachment.

 

ALLERGIES:  The patient is allergic to multiple medications including SULFA and VALIUM.

 

CURRENT HOME MEDICATIONS:  Family to bring accurate list of medications.  He is unable to recall all 
of his medications.  He is currently on Lantus 30 units a day.

 

FAMILY HISTORY:  Positive for heart disease, multiple myeloma, and diabetes.

 

SOCIAL HISTORY:  He uses a power wheelchair.  He currently lives at home with his wife who is the dec
ision maker.  He is FULL CODE.  No smoking, alcohol or drug use.

 

REVIEW OF SYSTEMS:  The following complete review of systems was negative, unless otherwise mentioned
 in the HPI or below: Constitutional:  Weight loss or gain, ability to conduct usual activities.  Ski
n:  Rash, itching. Eyes:  Double vision, pain.  ENT/Mouth:  Nose bleeding, neck stiffness, pain, tend
erness.  Cardiovascular:  Palpitations, dyspnea on exertion, orthopnea.  Respiratory:  Shortness of b
reath, wheezing, cough, hemoptysis, fever or night sweats.  Gastrointestinal:  Poor appetite, abdomin
al pain, heartburn, nausea, vomiting, constipation, or diarrhea.  Genitourinary:  Urgency, frequency,
 dysuria, nocturia.  Musculoskeletal:  Pain, swelling.  Neurologic/Psychiatric:  Anxiety, depression.
  Allergy/Immunologic:  Skin rash, bleeding tendency.

 

PHYSICAL EXAMINATION:

VITAL SIGNS:  Temperature 97.9, respiration 18, pulse rate of 72, [facility]  His last blood pressure is in systolic 200s.

GENERAL:  An 83-year-old man in no significant respiratory distress.  He feels better after Levaquin,
 Lasix, aspirin, and nitropatch in the emergency room.

HEENT:  Head atraumatic, normocephalic.  Sclerae are anicteric.  Dry mucous membrane, no oral lesion.


NECK:  Supple, no JVD appreciated.  No carotid bruit.

LUNGS:  Showed bilateral rhonchi with rales, especially at left base.  No significant accessory muscl
e use.  Trachea was in midline.

HEART:  S1, S2 present.  Regular rate and rhythm.  No murmur, rubs, or gallops appreciated.

ABDOMEN:  Soft, obese, bowel sounds present.

EXTREMITIES:  There is chronic bilateral lower extremity swelling along with dressing over his feet. 
 He also has a history of chronic venous stasis.  He recently had wound VAC.  There was no new tender
ness in bilateral lower extremities.

SKIN:  As discussed above.

LYMPH NODES:  No palpable lymph nodes in the neck.

PERIPHERAL VASCULAR:  Radial pulses palpable bilaterally.

MUSCULOSKELETAL:  No joint swelling or tenderness.

LYMPH NODES:  No palpable lymph nodes in the neck.

NEUROLOGY:  No new focal deficit.

PSYCHIATRIC:  Alert, awake, oriented x3.

 

LABORATORY AND X-RAY FINDINGS:  CBC showed WBC 6.9 with hemoglobin 10.7, hematocrit 31.7, platelet 13
7.  Chemistries showed sodium 137, potassium 4.4, chloride 103, bicarbonate 26, BUN 26, creatinine 1.
29, glucose of 209.  .  Troponins were negative.  Chest x-ray by my review showed right lower 
lobe infiltrate with some pulmonary vascular congestion.  EKG by my review showed sinus rhythm with f
irst degree AV block and PACs.

 

IMPRESSION:

1.  Community-acquired pneumonia, questionable pneumococcal.

2.  Acute on chronic diastolic heart failure exacerbation, probably precipitated by #1.

3.  Paroxysmal atrial fibrillation/nonsustained ventricular tachycardia.

4.  Coronary artery disease, status post stent placement in the past.

5.  Diabetes mellitus type 2.

6.  Obstructive sleep apnea on CPAP.

7.  Chronic bilateral lower extremity venous stasis with skin wounds followed by Wound Care.  He rece
ntly had a wound VAC.

8.  History of spinal cord injury.

9.  History of gastrointestinal bleeding in the past requiring discontinuation of Eliquis.

10.  Uncontrolled hypertension.

11.  Dyslipidemia.

12.  Chronic kidney disease stage 3.

 

PLAN:  The patient will be monitored on the telemetry unit.  We will start him back on his home medic
ations due to uncontrolled blood pressure.  Empiric antibiotics will be started.  Gentle diuresis.  I
nsulin sliding scale.  We will resume his selected home medications from last discharge.  The rest of
 his home medications will be started once confirmed.  Nebulizer treatments.  Home CPAP at bedtime.

 

Plan of care was discussed with the patient in detail and he stated understanding.

## 2018-08-22 NOTE — PDOC.PN
- Subjective


Encounter Start Date: 08/22/18


Encounter Start Time: 10:20


Subjective: no sob, feels better





- Objective


Resuscitation Status: 


 











Resuscitation Status           FULL:Full Resuscitation














MAR Reviewed: Yes


Vital Signs & Weight: 


 Vital Signs (12 hours)











  Temp Pulse Resp BP BP Pulse Ox


 


 08/22/18 11:56   64   138/65  


 


 08/22/18 11:55     138/65  


 


 08/22/18 11:50  98.5 F  64  20   138/65  98


 


 08/22/18 10:03   66  12    96


 


 08/22/18 09:54   67   132/61  


 


 08/22/18 08:05       97


 


 08/22/18 08:00  98.6 F  66  12   136/61  92 L


 


 08/22/18 07:01       92 L


 


 08/22/18 06:54   83  12    92 L


 


 08/22/18 06:46   83  12    92 L


 


 08/22/18 05:30   82    136/61 


 


 08/22/18 04:38   76   195/79 H  


 


 08/22/18 03:30  98.6 F  76  20    92 L


 


 08/22/18 03:00  98.6 F  76  20   195/79 H  92 L








 Weight











Admit Weight                   311 lb 12.8 oz


 


Weight                         311 lb 12.8 oz














I&O: 


 











 08/21/18 08/22/18 08/23/18





 06:59 06:59 06:59


 


Intake Total  200 


 


Balance  200 











Result Diagrams: 


 08/21/18 20:45





 08/21/18 20:44


Additional Labs: 


 Accuchecks











  08/22/18 08/22/18 08/22/18





  10:48 05:34 04:19


 


POC Glucose  234 H  174 H  156 H














Phys Exam





- Physical Examination


HEENT: PERRLA, moist MMs


Neck: no JVD, supple


Respiratory: no wheezing, no rales


Cardiovascular: RRR, no significant murmur


Gastrointestinal: soft, non-tender, no distention, positive bowel sounds


Musculoskeletal: pulses present, edema present


Neurological: non-focal, moves all 4 limbs


has chronic paraparesis of LE


Psychiatric: A&O x 3





Dx/Plan


(1) PNA (pneumonia)


Code(s): J18.9 - PNEUMONIA, UNSPECIFIED ORGANISM   Status: Acute   


Qualifiers: 


   Pneumonia type: due to unspecified organism   Laterality: right   Lung 

location: lower lobe of lung   Qualified Code(s): J18.1 - Lobar pneumonia, 

unspecified organism   





(2) CAD (coronary artery disease)


Code(s): I25.10 - ATHSCL HEART DISEASE OF NATIVE CORONARY ARTERY W/O ANG PCTRS 

  Status: Chronic   


Qualifiers: 


   Coronary Disease-Associated Artery/Lesion type: native artery   Native vs. 

transplanted heart: native heart   Associated angina: without angina   

Qualified Code(s): I25.10 - Atherosclerotic heart disease of native coronary 

artery without angina pectoris   





(3) Chronic a-fib


Code(s): I48.2 - CHRONIC ATRIAL FIBRILLATION   Status: Chronic   





(4) Chronic anemia


Code(s): D64.9 - ANEMIA, UNSPECIFIED   Status: Chronic   





(5) Chronic diastolic heart failure


Code(s): I50.32 - CHRONIC DIASTOLIC (CONGESTIVE) HEART FAILURE   Status: 

Chronic   Comment: ef 55%- 10/2017   





(6) DM type 2 (diabetes mellitus, type 2)


Status: Chronic   


Qualifiers: 


   Diabetes mellitus long term insulin use: with long term use   Diabetes 

mellitus complication status: with neurologic complications   Diabetes mellitus 

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 

diabetes mellitus with diabetic polyneuropathy; Z79.4 - Long term (current) use 

of insulin   





(7) GERD (gastroesophageal reflux disease)


Code(s): K21.9 - GASTRO-ESOPHAGEAL REFLUX DISEASE WITHOUT ESOPHAGITIS   Status: 

Chronic   


Qualifiers: 


   Esophagitis presence: esophagitis presence not specified   Qualified Code(s)

: K21.9 - Gastro-esophageal reflux disease without esophagitis   





(8) HTN (hypertension)


Code(s): I10 - ESSENTIAL (PRIMARY) HYPERTENSION   Status: Chronic   


Qualifiers: 


   Hypertension type: essential hypertension   Qualified Code(s): I10 - 

Essential (primary) hypertension   





(9) Morbid obesity with BMI of 40.0-44.9, adult


Code(s): E66.01 - MORBID (SEVERE) OBESITY DUE TO EXCESS CALORIES; Z68.41 - BODY 

MASS INDEX (BMI) 40.0-44.9, ADULT   Status: Chronic   





(10) GARCÍA (obstructive sleep apnea)


Code(s): G47.33 - OBSTRUCTIVE SLEEP APNEA (ADULT) (PEDIATRIC)   Status: Chronic

   





- Plan


on ceftriaxone and doxy


-: discussed code status with patient at bedside, wants to be DNR


-: continue asp, coreg, multaq, isordil, lasix and hydralazine


-: is chronically wheel chair bound, tx to medical floor


-: pt is off eliquis due to prior h/o GI bleed





* .








Review of Systems





- Medications/Allergies


Allergies/Adverse Reactions: 


 Allergies











Allergy/AdvReac Type Severity Reaction Status Date / Time


 


diazepam [From Valium] Allergy   Verified 08/22/18 04:35


 


Iodinated Contrast- Oral and Allergy   Verified 08/22/18 04:35





IV Dye     





[Iodinated Contrast Media -     





IV Dye]     


 


nitrofurantoin Allergy   Verified 08/22/18 04:35





[From Macrobid]     


 


sodium hypochlorite solution Allergy   Verified 08/22/18 04:35





[sodium hypochlorite]     


 


Sulfa (Sulfonamide Allergy   Verified 08/22/18 04:35





Antibiotics)     


 


sulfamethoxazole Allergy   Verified 08/22/18 04:35





[From Bactrim]     


 


trimethoprim [From Bactrim] Allergy   Verified 08/22/18 04:35


 


sodium hyperchlorite Allergy Unknown  Uncoded 08/22/18 04:35











Medications: 


 Current Medications





Acetaminophen (Tylenol)  650 mg PO Q4H PRN


   PRN Reason: Headache/Fever or Pain


Al Hydroxide/Mg Hydroxide (Maalox)  30 ml PO Q6H PRN


   PRN Reason: Heartburn  or Indigestion


Albuterol/Ipratropium (Duoneb)  3 ml NEB [id number] [facility]


   Last Admin: 08/22/18 10:03 Dose:  3 ml


Albuterol/Ipratropium (Duoneb)  3 ml NEB Q2H PRN


   PRN Reason: SOB &/or Wheezing


Aspirin (Ecotrin)  81 mg PO DAILY [facility]


   Last Admin: 08/22/18 09:51 Dose:  81 mg


Budesonide (Pulmicort Neb Solution)  0.5 mg INH BID-RT [facility]


   Last Admin: 08/22/18 06:54 Dose:  0.5 mg


Calcium Carbonate (Tums)  1,000 mg PO Q4H PRN


   PRN Reason: Heartburn  or Indigestion


Carvedilol (Coreg)  6.25 mg PO TID-WM [facility]


   Last Admin: 08/22/18 11:55 Dose:  6.25 mg


Dextrose/Water (Dextrose 50%)  25 gm SLOW IVP PRN PRN


   PRN Reason: Hypoglycemia


Doxycycline Hyclate (Vibramycin)  100 mg PO BID [facility]


   Last Admin: 08/22/18 09:50 Dose:  100 mg


Dronedarone (Multaq)  400 mg PO BID-[facility]


Enoxaparin Sodium (Lovenox)  40 mg SC 0900 [facility]


   Last Admin: 08/22/18 09:58 Dose:  40 mg


Furosemide (Lasix)  20 mg SLOW IVP 0600,1400 [facility]


   Last Admin: 08/22/18 06:37 Dose:  20 mg


Glipizide (Glucotrol)  10 mg PO BID-[facility]


   Last Admin: 08/22/18 09:51 Dose:  10 mg


Glucagon (Glucagon)  1 mg IM PRN PRN


   PRN Reason: Hypoglycemia


Hydralazine HCl (Apresoline)  25 mg PO TID-[facility]


   Last Admin: 08/22/18 11:56 Dose:  25 mg


Dextrose/Water (D5w)  1,000 mls @ 0 mls/hr IV .Q0M PRN


   PRN Reason: Hypoglycemia


Ceftriaxone Sodium 2 gm/ (Sodium Chloride)  100 mls @ 200 mls/hr IVPB Q24HR [facility]


   Last Admin: 08/22/18 04:57 Dose:  100 mls


Insulin Glargine 30 units/ (Miscellaneous Medication)  0.3 mls @ 0 mls/hr SC 

QAM [facility]


   Last Admin: 08/22/18 10:10 Dose:  0.3 mls


Insulin Human Regular (Humulin R)  0 units SC .MODERATE SLIDING SC PRN


   PRN Reason: Moderate Correctional Scale


   Last Admin: 08/22/18 12:01 Dose:  4 unit


Insulin Human Regular (Humulin R)  0 units SC .BEDTIME SLIDING SC PRN


   PRN Reason: Bedtime Correctional Scale


Isosorbide Dinitrate (Isordil)  20 mg PO TID-[facility]


   Last Admin: 08/22/18 11:55 Dose:  20 mg


Labetalol HCl (Normodyne)  10 mg SLOW IVP Q4H PRN


   PRN Reason: Systolic BP > 180


   Last Admin: 08/22/18 04:38 Dose:  10 mg


Nitroglycerin (Nitrostat)  0.4 mg PO Q5MIN PRN


   PRN Reason: Chest Pain


Pantoprazole Sodium (Protonix)  40 mg PO DAILY [facility]


   Last Admin: 08/22/18 09:51 Dose:  40 mg


Saccharomyces Boulardii (Florastor)  250 mg PO DAILY [facility]


   Last Admin: 08/22/18 09:50 Dose:  250 mg


Senna (Senokot)  2 tab PO HSPRN PRN


   PRN Reason: Constipation


   Last Admin: 08/22/18 10:01 Dose:  2 tab


Terazosin HCl (Hytrin)  5 mg PO DAILY [place]

## 2018-08-23 LAB
ANION GAP SERPL CALC-SCNC: 13 MMOL/L (ref 10–20)
BASOPHILS # BLD AUTO: 0 THOU/UL (ref 0–0.2)
BASOPHILS NFR BLD AUTO: 0.4 % (ref 0–1)
BUN SERPL-MCNC: 28 MG/DL (ref 8.4–25.7)
CALCIUM SERPL-MCNC: 8.5 MG/DL (ref 7.8–10.44)
CHLORIDE SERPL-SCNC: 104 MMOL/L (ref 98–107)
CO2 SERPL-SCNC: 28 MMOL/L (ref 23–31)
CREAT CL PREDICTED SERPL C-G-VRATE: 94 ML/MIN (ref 70–130)
EOSINOPHIL # BLD AUTO: 0.2 THOU/UL (ref 0–0.7)
EOSINOPHIL NFR BLD AUTO: 3.7 % (ref 0–10)
GLUCOSE SERPL-MCNC: 72 MG/DL (ref 83–110)
HGB BLD-MCNC: 10 G/DL (ref 14–18)
LYMPHOCYTES # BLD: 1.5 THOU/UL (ref 1.2–3.4)
LYMPHOCYTES NFR BLD AUTO: 23.2 % (ref 21–51)
MAGNESIUM SERPL-MCNC: 1.6 MG/DL (ref 1.6–2.6)
MCH RBC QN AUTO: 32.2 PG (ref 27–31)
MCV RBC AUTO: 96.1 FL (ref 78–98)
MONOCYTES # BLD AUTO: 0.6 THOU/UL (ref 0.11–0.59)
MONOCYTES NFR BLD AUTO: 9.6 % (ref 0–10)
NEUTROPHILS # BLD AUTO: 4 THOU/UL (ref 1.4–6.5)
NEUTROPHILS NFR BLD AUTO: 63.1 % (ref 42–75)
PLATELET # BLD AUTO: 128 THOU/UL (ref 130–400)
POTASSIUM SERPL-SCNC: 3.7 MMOL/L (ref 3.5–5.1)
RBC # BLD AUTO: 3.1 MILL/UL (ref 4.7–6.1)
SODIUM SERPL-SCNC: 141 MMOL/L (ref 136–145)
WBC # BLD AUTO: 6.3 THOU/UL (ref 4.8–10.8)

## 2018-08-23 RX ADMIN — CEFTRIAXONE SCH MLS: 2 INJECTION, POWDER, FOR SOLUTION INTRAMUSCULAR; INTRAVENOUS at 03:58

## 2018-08-23 RX ADMIN — Medication SCH MG: at 20:45

## 2018-08-23 RX ADMIN — INSULIN GLARGINE SCH: 100 INJECTION, SOLUTION SUBCUTANEOUS at 09:26

## 2018-08-23 RX ADMIN — Medication SCH ML: at 20:45

## 2018-08-23 RX ADMIN — MOMETASONE FUROATE AND FORMOTEROL FUMARATE DIHYDRATE SCH PUFF: 100; 5 AEROSOL RESPIRATORY (INHALATION) at 18:51

## 2018-08-23 RX ADMIN — ASPIRIN SCH MG: 81 TABLET ORAL at 09:14

## 2018-08-23 NOTE — PDOC.PN
- Subjective


Encounter Start Date: 08/23/18


Encounter Start Time: 10:30


Subjective: a bit lethargic this am, is oriented, wife at bedside





- Objective


Resuscitation Status: 


 











Resuscitation Status           DNR:Do Not Resuscitate














MAR Reviewed: Yes


Vital Signs & Weight: 


 Vital Signs (12 hours)











  Temp Pulse Resp BP BP Pulse Ox


 


 08/23/18 10:59  98.2 F  71  18   146/72 H  96


 


 08/23/18 10:09   60  16    85 L


 


 08/23/18 09:14   79   156/81 H  


 


 08/23/18 09:12     156/81 H  


 


 08/23/18 08:00  97.5 F L  60  16    93 L


 


 08/23/18 07:13  97.5 F L  79  18   156/81 H  92 L


 


 08/23/18 06:49       98


 


 08/23/18 06:27   63  16    98


 


 08/23/18 03:58  98.9 F  75  19   145/63 H  97


 


 08/23/18 01:57   62  16    98








 Weight











Admit Weight                   311 lb 12.8 oz


 


Weight                         316 lb 6.4 oz














I&O: 


 











 08/22/18 08/23/18 08/24/18





 06:59 06:59 06:59


 


Intake Total 200 1260 


 


Balance 200 1260 











Result Diagrams: 


 08/23/18 03:43





 08/23/18 03:43


Additional Labs: 


 Accuchecks











  08/23/18 08/23/18 08/23/18





  11:02 05:57 05:02


 


POC Glucose  165 H  99  71














  08/22/18 08/22/18





  20:36 16:48


 


POC Glucose  125 H  141 H














Phys Exam





- Physical Examination


HEENT: PERRLA, sclera anicteric


Neck: no JVD, supple


Respiratory: no wheezing, no rales


Cardiovascular: RRR, no significant murmur


Gastrointestinal: soft, non-tender, positive bowel sounds


Musculoskeletal: pulses present, edema present


Neurological: non-focal, moves all 4 limbs


Psychiatric: normal affect





Dx/Plan


(1) PNA (pneumonia)


Code(s): J18.9 - PNEUMONIA, UNSPECIFIED ORGANISM   Status: Acute   


Qualifiers: 


   Pneumonia type: due to unspecified organism   Laterality: right   Lung 

location: lower lobe of lung   Qualified Code(s): J18.1 - Lobar pneumonia, 

unspecified organism   





(2) CAD (coronary artery disease)


Code(s): I25.10 - ATHSCL HEART DISEASE OF NATIVE CORONARY ARTERY W/O ANG PCTRS 

  Status: Chronic   


Qualifiers: 


   Coronary Disease-Associated Artery/Lesion type: native artery   Native vs. 

transplanted heart: native heart   Associated angina: without angina   

Qualified Code(s): I25.10 - Atherosclerotic heart disease of native coronary 

artery without angina pectoris   





(3) Chronic a-fib


Code(s): I48.2 - CHRONIC ATRIAL FIBRILLATION   Status: Chronic   





(4) Chronic anemia


Code(s): D64.9 - ANEMIA, UNSPECIFIED   Status: Chronic   





(5) Chronic diastolic heart failure


Code(s): I50.32 - CHRONIC DIASTOLIC (CONGESTIVE) HEART FAILURE   Status: 

Chronic   Comment: ef 55%- 10/2017   





(6) DM type 2 (diabetes mellitus, type 2)


Status: Chronic   


Qualifiers: 


   Diabetes mellitus long term insulin use: with long term use   Diabetes 

mellitus complication status: with neurologic complications   Diabetes mellitus 

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 

diabetes mellitus with diabetic polyneuropathy; Z79.4 - Long term (current) use 

of insulin   





(7) GERD (gastroesophageal reflux disease)


Code(s): K21.9 - GASTRO-ESOPHAGEAL REFLUX DISEASE WITHOUT ESOPHAGITIS   Status: 

Chronic   


Qualifiers: 


   Esophagitis presence: esophagitis presence not specified   Qualified Code(s)

: K21.9 - Gastro-esophageal reflux disease without esophagitis   





(8) HTN (hypertension)


Code(s): I10 - ESSENTIAL (PRIMARY) HYPERTENSION   Status: Chronic   


Qualifiers: 


   Hypertension type: essential hypertension   Qualified Code(s): I10 - 

Essential (primary) hypertension   





(9) Morbid obesity with BMI of 40.0-44.9, adult


Code(s): E66.01 - MORBID (SEVERE) OBESITY DUE TO EXCESS CALORIES; Z68.41 - BODY 

MASS INDEX (BMI) 40.0-44.9, ADULT   Status: Chronic   





(10) GARCÍA (obstructive sleep apnea)


Code(s): G47.33 - OBSTRUCTIVE SLEEP APNEA (ADULT) (PEDIATRIC)   Status: Chronic

   





- Plan


is starting to use cpap now, fingerstick was low this am, is eating BF now


-: continue ceftriaxone and doxy


-: multaq, coreg, lasix, hydralazine and isordil


-: dulcolax supp x1, stool softeners, had bm yest but feels distended


-: to exercise on bed to help move his bowels/deconditioning





* .








Review of Systems





- Medications/Allergies


Allergies/Adverse Reactions: 


 Allergies











Allergy/AdvReac Type Severity Reaction Status Date / Time


 


diazepam [From Valium] Allergy   Verified 08/22/18 04:35


 


Iodinated Contrast- Oral and Allergy   Verified 08/22/18 04:35





IV Dye     





[Iodinated Contrast Media -     





IV Dye]     


 


nitrofurantoin Allergy   Verified 08/22/18 04:35





[From Macrobid]     


 


sodium hypochlorite solution Allergy   Verified 08/22/18 04:35





[sodium hypochlorite]     


 


Sulfa (Sulfonamide Allergy   Verified 08/22/18 04:35





Antibiotics)     


 


sulfamethoxazole Allergy   Verified 08/22/18 04:35





[From Bactrim]     


 


trimethoprim [From Bactrim] Allergy   Verified 08/22/18 04:35


 


sodium hyperchlorite Allergy Unknown  Uncoded 08/22/18 04:35











Medications: 


 Current Medications





Acetaminophen (Tylenol)  650 mg PO Q4H PRN


   PRN Reason: Headache/Fever or Pain


Al Hydroxide/Mg Hydroxide (Maalox)  30 ml PO Q6H PRN


   PRN Reason: Heartburn  or Indigestion


Albuterol/Ipratropium (Duoneb)  3 ml NEB [id number]


   Last Admin: 08/23/18 10:09 Dose:  3 ml


Albuterol/Ipratropium (Duoneb)  3 ml NEB Q2H PRN


   PRN Reason: SOB &/or Wheezing


Aspirin (Ecotrin)  81 mg PO DAILY [facility]


   Last Admin: 08/23/18 09:14 Dose:  81 mg


Benzonatate (Tessalon)  100 mg PO Q4H PRN


   PRN Reason: Cough


   Last Admin: 08/23/18 01:23 Dose:  100 mg


Budesonide (Pulmicort Neb Solution)  0.5 mg INH BID-RT [facility]


   Last Admin: 08/23/18 06:30 Dose:  0.5 mg


Calcium Carbonate (Tums)  1,000 mg PO Q4H PRN


   PRN Reason: Heartburn  or Indigestion


Carvedilol (Coreg)  6.25 mg PO TID-WM [facility]


   Last Admin: 08/23/18 09:12 Dose:  6.25 mg


Dextrose/Water (Dextrose 50%)  25 gm SLOW IVP PRN PRN


   PRN Reason: Hypoglycemia


Doxycycline Hyclate (Vibramycin)  100 mg PO BID [facility]


   Last Admin: 08/23/18 09:12 Dose:  100 mg


[facility]  [facility]


   Last Admin: 08/23/18 09:13 Dose:  400 mg


Enoxaparin Sodium (Lovenox)  40 mg SC 0900 [facility]


   Last Admin: 08/23/18 09:14 Dose:  40 mg


Furosemide (Lasix)  20 mg SLOW IVP 0600,1400 [facility]


   Last Admin: 08/23/18 06:38 Dose:  20 mg


Glipizide (Glucotrol)  10 mg PO BID-[facility]


   Last Admin: 08/23/18 09:13 Dose:  10 mg


Glucagon (Glucagon)  1 mg IM PRN PRN


   PRN Reason: Hypoglycemia


Guaifenesin/Dextromethorphan (Robitussin Dm)  20 ml PO Q4H PRN


   PRN Reason: Cough


[facility]yd[facility]azine [facility] ([facility]oline)  [facility]


   Last Admin: 08/23/18 09:14 Dose:  25 mg


Dextrose/Water (D5w)  1,000 mls @ 0 mls/hr IV .Q0M PRN


   PRN Reason: Hypoglycemia


Ceftriaxone Sodium 2 gm/ (Sodium Chloride)  100 mls @ 200 mls/hr IVPB Q24HR [facility]


   Last Admin: 08/23/18 03:58 Dose:  100 mls


Insulin Glargine 30 units/ (Miscellaneous Medication)  0.3 mls @ 0 mls/hr SC 

QAM [facility]


   Last Admin: 08/23/18 09:26 Dose:  Not Given


Insulin Human Regular (Humulin R)  0 units SC .MODERATE SLIDING SC PRN


   PRN Reason: Moderate Correctional Scale


   Last Admin: 08/22/18 12:01 Dose:  4 unit


Insulin Human Regular (Humulin R)  0 units SC .BEDTIME SLIDING SC PRN


   PRN Reason: Bedtime Correctional Scale


Isosorbide Dinitrate (Isordil)  20 mg PO TID-[facility]


   Last Admin: 08/23/18 09:13 Dose:  20 mg


Labetalol HCl (Normodyne)  10 mg SLOW IVP Q4H PRN


   PRN Reason: Systolic BP > 180


   Last Admin: 08/22/18 04:38 Dose:  10 mg


Nitroglycerin (Nitrostat)  0.4 mg PO Q5MIN PRN


   PRN Reason: Chest Pain


Pantoprazole Sodium (Protonix)  40 mg PO DAILY [facility]


   Last Admin: 08/23/18 09:14 Dose:  40 mg


Saccharomyces Boulardii (Florastor)  250 mg PO DAILY [facility]


   Last Admin: 08/23/18 09:14 Dose:  250 mg


Senna (Senokot)  2 tab PO HSPRN PRN


   PRN Reason: Constipation


   Last Admin: 08/22/18 10:01 Dose:  2 tab


Terazosin HCl (Hytrin)  5 mg PO DAILY [facility]


   Last Admin: 08/23/18 09:13 Dose:  5 mg

## 2018-08-23 NOTE — PDOC.CTH
<Renetta Acevedo - Last Filed: 08/23/18 17:28>





Cardiology Progress Note





- Subjective





The pt seen and examined.  No overnight events.  No cardiac complaints.  He 

complains of SOB after having meal.  





- Objective


 Vital Signs











  Temp Pulse Resp BP BP Pulse Ox


 


 08/23/18 16:04  98.7 F  69  20   136/76  93 L


 


 08/23/18 13:55   71  16    96


 


 08/23/18 12:25   71   146/72 H  


 


 08/23/18 10:59  98.2 F  71  18   146/72 H  96


 


 08/23/18 10:09   60  16    85 L


 


 08/23/18 09:14   79   156/81 H  


 


 08/23/18 09:12     156/81 H  


 


 08/23/18 08:00  97.5 F L  60  16    93 L


 


 08/23/18 07:13  97.5 F L  79  18   156/81 H  92 L


 


 08/23/18 06:49       98


 


 08/23/18 06:27   63  16    98








 











Admit Weight                   311 lb 12.8 oz


 


Weight                         316 lb 6.4 oz














 











 08/22/18 08/23/18 08/24/18





 06:59 06:59 06:59


 


Intake Total 200 1260 


 


Balance 200 1260 














- Physical Examination


General/Neuro: alert & oriented x3


Neck: no JVD present


Lungs: other: (diminished at bases)


Abdomen: soft


Extremities: other: (Dressing to Rt foot; 3-4 pitting BLE edema)





- Labs


Result Diagrams: 


 08/23/18 03:43





 08/23/18 03:43


 Troponin/CKMB











CK-MB (CK-2)  1.4 ng/mL (0-6.6)   08/21/18  20:44    


 


Troponin I  0.011 ng/mL (< 0.028)   08/22/18  02:52    














- Assessment/Plan





1. Acute on Chronic diastolic HF - Stable with Lasix 20mg PO, BiDil, and 

BBlocker.  


2. Acute resp. distress 2/2 PNA - Stable with 2LNC


3. CAD with hx of stent placement x3 - stable with BBlocker, ASA, and Statin.  


4. Chronic Afib - On Multaq and ASA 81mg qd due to hx of GI bleed


5. HTN - stable


6. DM type 2 - on ACHS BG check with SS Insulin


7. SA with Cpap @ HS - stable


8. Chronic Anemia - Stable with Iron supplement; 


9. Hx of GI bleed - 


10. Ankylosing Spondylitis - 


11. Morid obesity - 


MAR reviewed 





* Echo in 10/2017 showed EF 55-60%, mild MR, and mild TR. 


 





Review of Systems





- Review of Systems


Constitutional: reports: no symptoms reported


EENTM: reports: no symptoms reported


Respiratory: reports: see HPI


Cardiac (ROS): reports: no symptoms reported


ABD/GI: reports: no symptoms reported


: reports: no symptoms reported





<SIRIA Carlos - Last Filed: 08/23/18 18:42>





Cardiology Progress Note





- Objective


 Vital Signs











  Temp Pulse Resp BP BP Pulse Ox


 


 08/23/18 18:05     156/72 H  


 


 08/23/18 18:04   68   156/72 H  


 


 08/23/18 16:04  98.7 F  69  20   136/76  93 L


 


 08/23/18 13:55   71  16    96


 


 08/23/18 12:25   71   146/72 H  


 


 08/23/18 10:59  98.2 F  71  18   146/72 H  96


 


 08/23/18 10:09   60  16    85 L


 


 08/23/18 09:14   79   156/81 H  


 


 08/23/18 09:12     156/81 H  


 


 08/23/18 08:00  97.5 F L  60  16    93 L


 


 08/23/18 07:13  97.5 F L  79  18   156/81 H  92 L


 


 08/23/18 06:49       98








 











Admit Weight                   311 lb 12.8 oz


 


Weight                         316 lb 6.4 oz














 











 08/22/18 08/23/18 08/24/18





 06:59 06:59 06:59


 


Intake Total 200 1260 


 


Balance 200 1260 














- Labs


Result Diagrams: 


 08/23/18 03:43





 08/23/18 03:43


 Troponin/CKMB











CK-MB (CK-2)  1.4 ng/mL (0-6.6)   08/21/18  20:44    


 


Troponin I  0.011 ng/mL (< 0.028)   08/22/18  02:52    














- Assessment/Plan





Pt. seen and eval. by me. I agree with the A/P by the NP. The cardiac status is 

stable at this time.

## 2018-08-24 LAB
ANION GAP SERPL CALC-SCNC: 14 MMOL/L (ref 10–20)
BASOPHILS # BLD AUTO: 0 THOU/UL (ref 0–0.2)
BASOPHILS NFR BLD AUTO: 0.6 % (ref 0–1)
BUN SERPL-MCNC: 27 MG/DL (ref 8.4–25.7)
CALCIUM SERPL-MCNC: 8.4 MG/DL (ref 7.8–10.44)
CHLORIDE SERPL-SCNC: 106 MMOL/L (ref 98–107)
CO2 SERPL-SCNC: 25 MMOL/L (ref 23–31)
CREAT CL PREDICTED SERPL C-G-VRATE: 105 ML/MIN (ref 70–130)
EOSINOPHIL # BLD AUTO: 0.2 THOU/UL (ref 0–0.7)
EOSINOPHIL NFR BLD AUTO: 4.6 % (ref 0–10)
GLUCOSE SERPL-MCNC: 96 MG/DL (ref 83–110)
HGB BLD-MCNC: 9.5 G/DL (ref 14–18)
LYMPHOCYTES # BLD: 1.5 THOU/UL (ref 1.2–3.4)
LYMPHOCYTES NFR BLD AUTO: 28.9 % (ref 21–51)
MAGNESIUM SERPL-MCNC: 1.4 MG/DL (ref 1.6–2.6)
MCH RBC QN AUTO: 31.4 PG (ref 27–31)
MCV RBC AUTO: 95.6 FL (ref 78–98)
MONOCYTES # BLD AUTO: 0.5 THOU/UL (ref 0.11–0.59)
MONOCYTES NFR BLD AUTO: 9.9 % (ref 0–10)
NEUTROPHILS # BLD AUTO: 2.9 THOU/UL (ref 1.4–6.5)
NEUTROPHILS NFR BLD AUTO: 56 % (ref 42–75)
PLATELET # BLD AUTO: 134 THOU/UL (ref 130–400)
POTASSIUM SERPL-SCNC: 3.3 MMOL/L (ref 3.5–5.1)
RBC # BLD AUTO: 3.02 MILL/UL (ref 4.7–6.1)
SODIUM SERPL-SCNC: 142 MMOL/L (ref 136–145)
WBC # BLD AUTO: 5.1 THOU/UL (ref 4.8–10.8)

## 2018-08-24 RX ADMIN — Medication SCH ML: at 20:04

## 2018-08-24 RX ADMIN — Medication SCH MG: at 20:04

## 2018-08-24 RX ADMIN — CEFTRIAXONE SCH MLS: 2 INJECTION, POWDER, FOR SOLUTION INTRAMUSCULAR; INTRAVENOUS at 04:32

## 2018-08-24 RX ADMIN — MOMETASONE FUROATE AND FORMOTEROL FUMARATE DIHYDRATE SCH PUFF: 100; 5 AEROSOL RESPIRATORY (INHALATION) at 18:03

## 2018-08-24 RX ADMIN — Medication SCH MG: at 08:36

## 2018-08-24 RX ADMIN — INSULIN HUMAN PRN UNIT: 100 INJECTION, SOLUTION PARENTERAL at 17:14

## 2018-08-24 RX ADMIN — ASPIRIN SCH MG: 81 TABLET ORAL at 08:37

## 2018-08-24 RX ADMIN — MOMETASONE FUROATE AND FORMOTEROL FUMARATE DIHYDRATE SCH PUFF: 100; 5 AEROSOL RESPIRATORY (INHALATION) at 06:32

## 2018-08-24 RX ADMIN — INSULIN GLARGINE SCH MLS: 100 INJECTION, SOLUTION SUBCUTANEOUS at 12:23

## 2018-08-24 RX ADMIN — Medication SCH ML: at 08:37

## 2018-08-24 RX ADMIN — INSULIN GLARGINE SCH: 100 INJECTION, SOLUTION SUBCUTANEOUS at 08:42

## 2018-08-24 NOTE — PDOC.PN
- Subjective


Encounter Start Date: 08/24/18


Encounter Start Time: 07:45


Subjective: cough is better


-: is wearing cpap when sleeping, had another bm last night


-: feels better overall, no c/o palp or sob





- Objective


Resuscitation Status: 


 











Resuscitation Status           DNR:Do Not Resuscitate














MAR Reviewed: Yes


Vital Signs & Weight: 


 Vital Signs (12 hours)











  Temp Pulse Resp BP Pulse Ox


 


 08/24/18 08:00  98.0 F  56 L  16   96


 


 08/24/18 07:17  98.0 F  56 L  16  144/64 H  96


 


 08/24/18 06:31   67  16   97


 


 08/24/18 06:28   67  16   97


 


 08/24/18 01:56   66  18   95


 


 08/23/18 23:12   55 L  16   95








 Weight











Admit Weight                   311 lb 12.8 oz


 


Weight                         313 lb 12.8 oz














I&O: 


 











 08/23/18 08/24/18 08/25/18





 06:59 06:59 06:59


 


Intake Total 1260 100 


 


Balance 1260 100 











Result Diagrams: 


 08/24/18 04:34





 08/24/18 04:34


Additional Labs: 


 Accuchecks











  08/24/18 08/23/18 08/23/18





  04:37 19:39 16:06


 


POC Glucose  103  155 H  171 H














  08/23/18 08/23/18





  11:02 09:18


 


POC Glucose  165 H  113 H














Phys Exam





- Physical Examination


HEENT: PERRLA, moist MMs


Neck: no JVD, supple


Respiratory: no wheezing, no rales


rhonchi+


Cardiovascular: no significant murmur, irregular


Gastrointestinal: soft, non-tender, positive bowel sounds


Musculoskeletal: pulses present, edema present


Neurological: non-focal, moves all 4 limbs


Psychiatric: normal affect, A&O x 3





Dx/Plan


(1) PNA (pneumonia)


Code(s): J18.9 - PNEUMONIA, UNSPECIFIED ORGANISM   Status: Acute   


Qualifiers: 


   Pneumonia type: due to unspecified organism   Laterality: right   Lung 

location: lower lobe of lung   Qualified Code(s): J18.1 - Lobar pneumonia, 

unspecified organism   





(2) CAD (coronary artery disease)


Code(s): I25.10 - ATHSCL HEART DISEASE OF NATIVE CORONARY ARTERY W/O ANG PCTRS 

  Status: Chronic   


Qualifiers: 


   Coronary Disease-Associated Artery/Lesion type: native artery   Native vs. 

transplanted heart: native heart   Associated angina: without angina   

Qualified Code(s): I25.10 - Atherosclerotic heart disease of native coronary 

artery without angina pectoris   





(3) Chronic a-fib


Code(s): I48.2 - CHRONIC ATRIAL FIBRILLATION   Status: Chronic   





(4) Chronic anemia


Code(s): D64.9 - ANEMIA, UNSPECIFIED   Status: Chronic   





(5) Chronic diastolic heart failure


Code(s): I50.32 - CHRONIC DIASTOLIC (CONGESTIVE) HEART FAILURE   Status: 

Chronic   Comment: ef 55%- 10/2017   





(6) DM type 2 (diabetes mellitus, type 2)


Status: Chronic   


Qualifiers: 


   Diabetes mellitus long term insulin use: with long term use   Diabetes 

mellitus complication status: with neurologic complications   Diabetes mellitus 

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 

diabetes mellitus with diabetic polyneuropathy; Z79.4 - Long term (current) use 

of insulin   





(7) GERD (gastroesophageal reflux disease)


Code(s): K21.9 - GASTRO-ESOPHAGEAL REFLUX DISEASE WITHOUT ESOPHAGITIS   Status: 

Chronic   


Qualifiers: 


   Esophagitis presence: esophagitis presence not specified   Qualified Code(s)

: K21.9 - Gastro-esophageal reflux disease without esophagitis   





(8) HTN (hypertension)


Code(s): I10 - ESSENTIAL (PRIMARY) HYPERTENSION   Status: Chronic   


Qualifiers: 


   Hypertension type: essential hypertension   Qualified Code(s): I10 - 

Essential (primary) hypertension   





(9) Morbid obesity with BMI of 40.0-44.9, adult


Code(s): E66.01 - MORBID (SEVERE) OBESITY DUE TO EXCESS CALORIES; Z68.41 - BODY 

MASS INDEX (BMI) 40.0-44.9, ADULT   Status: Chronic   





(10) GARCÍA (obstructive sleep apnea)


Code(s): G47.33 - OBSTRUCTIVE SLEEP APNEA (ADULT) (PEDIATRIC)   Status: Chronic

   





- Plan


is recovering well with pna


-: ceftriaxone and doxy, nebs


-: dm is fairly controlled now, to exercise/mobilize with PT as tolerated


-: continue asp, lipitor, multaq, lasix, isordil and hydralazine


-: on glipizide and lantus for dm, likely dc plan in am if stable





* .








Review of Systems





- Medications/Allergies


Allergies/Adverse Reactions: 


 Allergies











Allergy/AdvReac Type Severity Reaction Status Date / Time


 


diazepam [From Valium] Allergy   Verified 08/22/18 04:35


 


Iodinated Contrast- Oral and Allergy   Verified 08/22/18 04:35





IV Dye     





[Iodinated Contrast Media -     





IV Dye]     


 


nitrofurantoin Allergy   Verified 08/22/18 04:35





[From Macrobid]     


 


sodium hypochlorite solution Allergy   Verified 08/22/18 04:35





[sodium hypochlorite]     


 


Sulfa (Sulfonamide Allergy   Verified 08/22/18 04:35





Antibiotics)     


 


sulfamethoxazole Allergy   Verified 08/22/18 04:35





[From Bactrim]     


 


trimethoprim [From Bactrim] Allergy   Verified 08/22/18 04:35


 


sodium hyperchlorite Allergy Unknown  Uncoded 08/22/18 04:35











Medications: 


 Current Medications





Acetaminophen (Tylenol)  650 mg PO Q4H PRN


   PRN Reason: Headache/Fever or Pain


Al Hydroxide/Mg Hydroxide (Maalox)  30 ml PO Q6H PRN


   PRN Reason: Heartburn  or Indigestion


Albuterol/Ipratropium (Duoneb)  3 ml NEB [id number] [facility]


   Last Admin: 08/24/18 06:28 Dose:  3 ml


Albuterol/Ipratropium (Duoneb)  3 ml NEB Q2H PRN


   PRN Reason: SOB &/or Wheezing


Ascorbic Acid (Vitamin C)  1,000 mg PO BID [facility]


   Last Admin: 08/24/18 08:36 Dose:  1,000 mg


Aspirin (Ecotrin)  81 mg PO DAILY [facility]


   Last Admin: 08/24/18 08:37 Dose:  81 mg


Atorvastatin Calcium (Lipitor)  80 mg PO HS [facility]


   Last Admin: 08/23/18 20:45 Dose:  80 mg


Benzonatate (Tessalon)  100 mg PO Q4H PRN


   PRN Reason: Cough


   Last Admin: 08/23/18 18:51 Dose:  100 mg


Budesonide (Pulmicort Neb Solution)  0.5 mg INH BID-RT [facility]


   Last Admin: 08/24/18 06:31 Dose:  0.5 mg


Calcium Carbonate (Tums)  1,000 mg PO Q4H PRN


   PRN Reason: Heartburn  or Indigestion


Carvedilol (Coreg)  6.25 mg PO TID-WM [facility]


   Last Admin: 08/24/18 08:36 Dose:  6.25 mg


Dextrose/Water (Dextrose 50%)  25 gm SLOW IVP PRN PRN


   PRN Reason: Hypoglycemia


Docusate Sodium (Colace)  100 mg PO BID [facility]


   Last Admin: 08/24/18 08:36 Dose:  100 mg


Doxycycline Hyclate (Vibramycin)  100 mg PO BID [facility]


   Last Admin: 08/24/18 08:35 Dose:  100 mg


[facility]  [facility]


   Last Admin: 08/24/18 08:36 Dose:  400 mg


Enoxaparin Sodium (Lovenox)  40 mg SC 0900 [facility]


   Last Admin: 08/24/18 08:37 Dose:  40 mg


Ferrous Sulfate (Feosol)  325 mg PO BID-[facility]


   Last Admin: 08/24/18 08:35 Dose:  325 mg


[facility])  [facility]


[facility] ([facility]ucotrol)  [facility]


   Last Admin: 08/24/18 08:36 Dose:  10 mg


Glucagon (Glucagon)  1 mg IM PRN PRN


   PRN Reason: Hypoglycemia


Guaifenesin/Dextromethorphan (Robitussin Dm)  20 ml PO Q4H PRN


   PRN Reason: Cough


Hydralazine HCl (Apresoline)  25 mg PO TID-[facility]


   Last Admin: 08/24/18 08:36 Dose:  25 mg


Dextrose/Water (D5w)  1,000 mls @ 0 mls/hr IV .Q0M PRN


   PRN Reason: Hypoglycemia


Ceftriaxone Sodium 2 gm/ (Sodium Chloride)  100 mls @ 200 mls/hr IVPB Q24HR [facility]


   Last Admin: 08/24/18 04:32 Dose:  100 mls


Insulin Glargine 30 units/ (Miscellaneous Medication)  0.3 mls @ 0 mls/hr SC 

QAM [facility]


   Last Admin: 08/24/18 08:42 Dose:  Not Given


Insulin Human Regular (Humulin R)  0 units SC .MODERATE SLIDING SC PRN


   PRN Reason: Moderate Correctional Scale


   Last Admin: 08/22/18 12:01 Dose:  4 unit


Insulin Human Regular (Humulin R)  0 units SC .BEDTIME SLIDING SC PRN


   PRN Reason: Bedtime Correctional Scale


Isosorbide Dinitrate (Isordil)  20 mg PO TID-[facility]


   Last Admin: 08/24/18 08:36 Dose:  20 mg


Labetalol HCl (Normodyne)  10 mg SLOW IVP Q4H PRN


   PRN Reason: Systolic BP > 180


   Last Admin: 08/22/18 04:38 Dose:  10 mg


Mometasone Furoate/Formoterol Fumar (Dulera 100 Mcg/5 Mcg Inhaler)  2 puff INH 

BID-RT [facility]


   Last Admin: 08/24/18 06:32 Dose:  2 puff


Nitroglycerin (Nitrostat)  0.4 mg PO Q5MIN PRN


   PRN Reason: Chest Pain


Pantoprazole Sodium (Protonix)  40 mg PO DAILY [facility]


   Last Admin: 08/24/18 08:36 Dose:  40 mg


Polyethylene Glycol (Miralax)  17 gm PO DAILY [facility]


   Last Admin: 08/24/18 08:37 Dose:  Not Given


Potassium Chloride (K-Dur)  40 meq PO NOW [facility]


   Stop: 08/24/18 13:00


Saccharomyces Boulardii (Florastor)  250 mg PO DAILY [facility]


   Last Admin: 08/24/18 08:35 Dose:  250 mg


Senna (Senokot)  2 tab PO HSPRN PRN


   PRN Reason: Constipation


   Last Admin: 08/22/18 10:01 Dose:  2 tab


Sodium Chloride (Flush - Normal Saline)  10 ml IVF Q12HR [facility]


   Last Admin: 08/24/18 08:37 Dose:  10 ml


Sodium Chloride (Flush - Normal Saline)  10 ml IVF PRN PRN


   PRN Reason: Saline Flush


Terazosin HCl (Hytrin)  5 mg PO DAILY [facility]


   Last Admin: 08/24/18 08:36 Dose:  5 mg

## 2018-08-24 NOTE — PDOC.CTH
<Renetta Acevedo - Last Filed: 08/24/18 10:15>





Cardiology Progress Note





- Subjective





The pt seen and examined.  No overnight events.  No cardiac complaints. 





- Objective


 Vital Signs











  Temp Pulse Resp BP Pulse Ox


 


 08/24/18 08:00  98.0 F  56 L  16   96


 


 08/24/18 07:17  98.0 F  56 L  16  144/64 H  96


 


 08/24/18 06:31   67  16   97


 


 08/24/18 06:28   67  16   97


 


 08/24/18 01:56   66  18   95


 


 08/23/18 23:12   55 L  16   95








 











Admit Weight                   311 lb 12.8 oz


 


Weight                         313 lb 12.8 oz














 











 08/23/18 08/24/18 08/25/18





 06:59 06:59 06:59


 


Intake Total 1260 100 


 


Balance 1260 100 














- Physical Examination


General/Neuro: alert & oriented x3


Neck: no JVD present


Lungs: other: (diminished at bases)


Abdomen: soft


Extremities: other: (3+ pitting BLE edema)





- Labs


Result Diagrams: 


 08/24/18 04:34





 08/24/18 04:34


 Troponin/CKMB











CK-MB (CK-2)  1.4 ng/mL (0-6.6)   08/21/18  20:44    


 


Troponin I  0.011 ng/mL (< 0.028)   08/22/18  02:52    














- Assessment/Plan





1. Acute on Chronic diastolic HF - Stable with Lasix 20mg IV BID, which will be 

changed to 40mg PO daily; Also on BiDil and BBlocker.  


2. Acute resp. distress 2/2 PNA - Stable with 2LNC


3. CAD with hx of stent placement x3 - stable with BBlocker, ASA, and Statin.  


4. Chronic Afib - On Multaq and ASA 81mg qd, but not on OAC due to hx of GI 

bleed


5. HTN - stable


6. DM type 2 - on ACHS BG check with SS Insulin


7. SA with Cpap @ HS - stable


8. Chronic Anemia - Stable with Iron supplement; 


9. Hx of GI bleed - 


10. Ankylosing Spondylitis - 


11. Morid obesity - 


12. Hypokalemia - Kcl 40mEq PO x1 today. 


MAR reviewed 





* Echo in 10/2017 showed EF 55-60%, mild MR, and mild TR. 





Review of Systems





- Review of Systems


Constitutional: reports: no symptoms reported


EENTM: reports: no symptoms reported


Respiratory: reports: no symptoms reported


Cardiac (ROS): reports: no symptoms reported


ABD/GI: reports: no symptoms reported


: reports: no symptoms reported





<SIRIA Carlos - Last Filed: 08/24/18 14:44>





Cardiology Progress Note





- Objective


 Vital Signs











  Temp Pulse Resp BP BP Pulse Ox


 


 08/24/18 14:25   68  16    99


 


 08/24/18 12:23   68    


 


 08/24/18 11:10   56 L  16    96


 


 08/24/18 11:00  98.1 F  68  16   144/64 H  99


 


 08/24/18 08:00  98.0 F  56 L  16    96


 


 08/24/18 07:17  98.0 F  56 L  16  144/64 H   96


 


 08/24/18 06:31   67  16    97


 


 08/24/18 06:28   67  16    97








 











Admit Weight                   311 lb 12.8 oz


 


Weight                         313 lb 12.8 oz














 











 08/23/18 08/24/18 08/25/18





 06:59 06:59 06:59


 


Intake Total 1260 100 


 


Balance 1260 100 














- Labs


Result Diagrams: 


 08/24/18 04:34





 08/24/18 04:34


 Troponin/CKMB











CK-MB (CK-2)  1.4 ng/mL (0-6.6)   08/21/18  20:44    


 


Troponin I  0.011 ng/mL (< 0.028)   08/22/18  02:52    














- Assessment/Plan





Pt. seen and eval. by me. I agree with the A/P by the NP. He is improving, the 

edema in the lower extremities has also decreased. The abd. remains distended 

and tympanic but he feels that it is better and he has had BM's.RRR. 


Plan for d/c tomorrow. I will sign off. He will see Dr. Landers in the next 

month in the office.

## 2018-08-25 VITALS — DIASTOLIC BLOOD PRESSURE: 79 MMHG | TEMPERATURE: 97.7 F | SYSTOLIC BLOOD PRESSURE: 158 MMHG

## 2018-08-25 RX ADMIN — MOMETASONE FUROATE AND FORMOTEROL FUMARATE DIHYDRATE SCH: 100; 5 AEROSOL RESPIRATORY (INHALATION) at 18:04

## 2018-08-25 RX ADMIN — CEFTRIAXONE SCH MLS: 2 INJECTION, POWDER, FOR SOLUTION INTRAMUSCULAR; INTRAVENOUS at 04:35

## 2018-08-25 RX ADMIN — Medication SCH ML: at 08:44

## 2018-08-25 RX ADMIN — INSULIN GLARGINE SCH MLS: 100 INJECTION, SOLUTION SUBCUTANEOUS at 08:43

## 2018-08-25 RX ADMIN — HYDROCODONE BITARTRATE AND ACETAMINOPHEN PRN TAB: 5; 325 TABLET ORAL at 09:18

## 2018-08-25 RX ADMIN — Medication SCH MG: at 08:43

## 2018-08-25 RX ADMIN — MOMETASONE FUROATE AND FORMOTEROL FUMARATE DIHYDRATE SCH PUFF: 100; 5 AEROSOL RESPIRATORY (INHALATION) at 06:57

## 2018-08-25 RX ADMIN — HYDROCODONE BITARTRATE AND ACETAMINOPHEN PRN TAB: 5; 325 TABLET ORAL at 00:56

## 2018-08-25 RX ADMIN — ASPIRIN SCH MG: 81 TABLET ORAL at 08:43

## 2018-08-25 NOTE — EKG
Test Reason : 

Blood Pressure : ***/*** mmHG

Vent. Rate : 070 BPM     Atrial Rate : 070 BPM

   P-R Int : 238 ms          QRS Dur : 094 ms

    QT Int : 438 ms       P-R-T Axes : 053 086 096 degrees

   QTc Int : 473 ms

 

Sinus rhythm with 1st degree A-V block with Premature atrial complexes

Otherwise normal ECG

 

Confirmed by SONIA TREJO, DAXA ([id number]),  SP ALDRIDGE ([id number]) on 8/25/2018 10:52:56 AM

 

Referred By:             Confirmed By:DAXA SCOTT MD

## 2018-08-25 NOTE — PDOC.PN
- Subjective


Encounter Start Date: 08/25/18


Encounter Start Time: 09:20


Subjective: c/o left calf pain, no sob


-: overall is feeling much better, ate his breakfast fully


-: had bm last evening with no abd fullness now





- Objective


Resuscitation Status: 


 











Resuscitation Status           DNR:Do Not Resuscitate














MAR Reviewed: Yes


Vital Signs & Weight: 


 Vital Signs (12 hours)











  Temp Pulse Resp BP BP Pulse Ox


 


 08/25/18 11:33   75   136/74  


 


 08/25/18 11:32     136/74  


 


 08/25/18 11:31   75  18    95


 


 08/25/18 08:42   59 L   178/85 H  


 


 08/25/18 08:41     178/85 H  


 


 08/25/18 08:00  97.3 F L  59 L  20    95


 


 08/25/18 07:00  97.3 F L  59 L  20   178/85 H  95


 


 08/25/18 06:57   58 L  12    98


 


 08/25/18 06:56   58 L  18    98


 


 08/25/18 06:52       98


 


 08/25/18 06:49   58 L  18    98


 


 08/25/18 01:49   64  20    97








 Weight











Admit Weight                   311 lb 12.8 oz


 


Weight                         308 lb 2 oz














I&O: 


 











 08/24/18 08/25/18 08/26/18





 06:59 06:59 06:59


 


Intake Total 100 300 


 


Balance 100 300 











Result Diagrams: 


 08/24/18 04:34





 08/24/18 04:34


Additional Labs: 


 Accuchecks











  08/25/18 08/24/18 08/24/18





  05:47 19:56 16:49


 


POC Glucose  99  227 H  216 H














Phys Exam





- Physical Examination


HEENT: PERRLA, moist MMs


Neck: no JVD, supple


Respiratory: no wheezing, no rales


Cardiovascular: RRR, no significant murmur


Gastrointestinal: soft, non-tender, positive bowel sounds


Musculoskeletal: pulses present, edema present


Neurological: non-focal, moves all 4 limbs


Psychiatric: normal affect, A&O x 3





Dx/Plan


(1) PNA (pneumonia)


Code(s): J18.9 - PNEUMONIA, UNSPECIFIED ORGANISM   Status: Acute   


Qualifiers: 


   Pneumonia type: due to unspecified organism   Laterality: right   Lung 

location: lower lobe of lung   Qualified Code(s): J18.1 - Lobar pneumonia, 

unspecified organism   





(2) CAD (coronary artery disease)


Code(s): I25.10 - ATHSCL HEART DISEASE OF NATIVE CORONARY ARTERY W/O ANG PCTRS 

  Status: Chronic   


Qualifiers: 


   Coronary Disease-Associated Artery/Lesion type: native artery   Native vs. 

transplanted heart: native heart   Associated angina: without angina   

Qualified Code(s): I25.10 - Atherosclerotic heart disease of native coronary 

artery without angina pectoris   





(3) Chronic a-fib


Code(s): I48.2 - CHRONIC ATRIAL FIBRILLATION   Status: Chronic   





(4) Chronic anemia


Code(s): D64.9 - ANEMIA, UNSPECIFIED   Status: Chronic   





(5) Chronic diastolic heart failure


Code(s): I50.32 - CHRONIC DIASTOLIC (CONGESTIVE) HEART FAILURE   Status: 

Chronic   Comment: ef 55%- 10/2017   





(6) DM type 2 (diabetes mellitus, type 2)


Status: Chronic   


Qualifiers: 


   Diabetes mellitus long term insulin use: with long term use   Diabetes 

mellitus complication status: with neurologic complications   Diabetes mellitus 

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 

diabetes mellitus with diabetic polyneuropathy; Z79.4 - Long term (current) use 

of insulin   





(7) GERD (gastroesophageal reflux disease)


Code(s): K21.9 - GASTRO-ESOPHAGEAL REFLUX DISEASE WITHOUT ESOPHAGITIS   Status: 

Chronic   


Qualifiers: 


   Esophagitis presence: esophagitis presence not specified   Qualified Code(s)

: K21.9 - Gastro-esophageal reflux disease without esophagitis   





(8) HTN (hypertension)


Code(s): I10 - ESSENTIAL (PRIMARY) HYPERTENSION   Status: Chronic   


Qualifiers: 


   Hypertension type: essential hypertension   Qualified Code(s): I10 - 

Essential (primary) hypertension   





(9) Morbid obesity with BMI of 40.0-44.9, adult


Code(s): E66.01 - MORBID (SEVERE) OBESITY DUE TO EXCESS CALORIES; Z68.41 - BODY 

MASS INDEX (BMI) 40.0-44.9, ADULT   Status: Chronic   





(10) GARCÍA (obstructive sleep apnea)


Code(s): G47.33 - OBSTRUCTIVE SLEEP APNEA (ADULT) (PEDIATRIC)   Status: Chronic

   





- Plan


usg venous doppler of LE, if -ve may dc home


-: cxr reviewed


-: oral levaquin


-: d/w pt and wife at bedside


-: had a prior episode 2 yr back with Hb dropping to 5g on eliquis for afib





* .








Review of Systems





- Medications/Allergies


Allergies/Adverse Reactions: 


 Allergies











Allergy/AdvReac Type Severity Reaction Status Date / Time


 


diazepam [From Valium] Allergy   Verified 08/22/18 04:35


 


Iodinated Contrast- Oral and Allergy   Verified 08/22/18 04:35





IV Dye     





[Iodinated Contrast Media -     





IV Dye]     


 


nitrofurantoin Allergy   Verified 08/22/18 04:35





[From Macrobid]     


 


sodium hypochlorite solution Allergy   Verified 08/22/18 04:35





[sodium hypochlorite]     


 


Sulfa (Sulfonamide Allergy   Verified 08/22/18 04:35





Antibiotics)     


 


sulfamethoxazole Allergy   Verified 08/22/18 04:35





[From Bactrim]     


 


trimethoprim [From Bactrim] Allergy   Verified 08/22/18 04:35


 


sodium hyperchlorite Allergy Unknown  Uncoded 08/22/18 04:35











Medications: 


 Current Medications





Acetaminophen (Tylenol)  650 mg PO Q4H PRN


   PRN Reason: Headache/Fever or Pain


Hydrocodone Bitart/Acetaminophen (Norco 5/325)  1 tab PO Q4H PRN


   PRN Reason: Moderate Pain (4-6)


   Last Admin: 08/25/18 09:18 Dose:  1 tab


Al Hydroxide/Mg Hydroxide (Maalox)  30 ml PO Q6H PRN


   PRN Reason: Heartburn  or Indigestion


Albuterol/Ipratropium (Duoneb)  3 ml NEB [id number] [facility]


   Last Admin: 08/25/18 11:31 Dose:  3 ml


Albuterol/Ipratropium (Duoneb)  3 ml NEB Q2H PRN


   PRN Reason: SOB &/or Wheezing


Ascorbic Acid (Vitamin C)  1,000 mg PO BID [facility]


   Last Admin: 08/25/18 08:43 Dose:  1,000 mg


Aspirin (Ecotrin)  81 mg PO DAILY [facility]


   Last Admin: 08/25/18 08:43 Dose:  81 mg


Atorvastatin Calcium (Lipitor)  80 mg PO HS [facility]


   Last Admin: 08/24/18 20:04 Dose:  80 mg


Benzonatate (Tessalon)  100 mg PO Q4H PRN


   PRN Reason: Cough


   Last Admin: 08/23/18 18:51 Dose:  100 mg


Budesonide (Pulmicort Neb Solution)  0.5 mg INH BID-RT [facility]


   Last Admin: 08/25/18 06:56 Dose:  0.5 mg


Calcium Carbonate (Tums)  1,000 mg PO Q4H PRN


   PRN Reason: Heartburn  or Indigestion


Carvedilol (Coreg)  6.25 mg PO TID-[facility]


   Last Admin: 08/25/18 11:32 Dose:  6.25 mg


Dextrose/Water (Dextrose 50%)  25 gm SLOW IVP PRN PRN


   PRN Reason: Hypoglycemia


Docusate Sodium (Colace)  100 mg PO BID [facility]


   Last Admin: 08/25/18 08:43 Dose:  100 mg


Doxycycline Hyclate (Vibramycin)  100 mg PO BID [facility]


   Last Admin: 08/25/18 08:43 Dose:  100 mg


[facility])  [facility]


   Last Admin: 08/25/18 08:42 Dose:  400 mg


Enoxaparin Sodium (Lovenox)  40 mg SC 0900 [facility]


   Last Admin: 08/25/18 08:43 Dose:  40 mg


Ferrous Sulfate (Feosol)  [facility]


   Last Admin: 08/25/18 08:42 Dose:  325 mg


[facility] ([facility]ix)  [facility]


   Last Admin: 08/25/18 08:41 Dose:  40 mg


[facility]ipiz[facility]ucotrol)  [facility]


   Last Admin: 08/25/18 08:42 Dose:  10 mg


Glucagon (Glucagon)  1 mg IM PRN PRN


   PRN Reason: Hypoglycemia


Guaifenesin/Dextromethorphan (Robitussin Dm)  20 ml PO Q4H PRN


   PRN Reason: Cough


Hydralazine HCl (Apresoline)  25 mg PO TID-[facility]


   Last Admin: 08/25/18 11:33 Dose:  25 mg


Dextrose/Water (D5w)  1,000 mls @ 0 mls/hr IV .Q0M PRN


   PRN Reason: Hypoglycemia


Ceftriaxone Sodium 2 gm/ (Sodium Chloride)  100 mls @ 200 mls/hr IVPB Q24HR [facility]


   Last Admin: 08/25/18 04:35 Dose:  100 mls


Insulin Glargine 30 units/ (Miscellaneous Medication)  0.3 mls @ 0 mls/hr SC 

QAM [facility]


   Last Admin: 08/25/18 08:43 Dose:  0.3 mls


Insulin Human Regular (Humulin R)  0 units SC .MODERATE SLIDING SC PRN


   PRN Reason: Moderate Correctional Scale


   Last Admin: 08/24/18 17:14 Dose:  4 unit


Insulin Human Regular (Humulin R)  0 units SC .BEDTIME SLIDING SC PRN


   PRN Reason: Bedtime Correctional Scale


   Last Admin: 08/24/18 20:05 Dose:  2 unit


Isosorbide Dinitrate (Isordil)  20 mg PO TID-WM [facility]


   Last Admin: 08/25/18 11:33 Dose:  20 mg


Labetalol HCl (Normodyne)  10 mg SLOW IVP Q4H PRN


   PRN Reason: Systolic BP > 180


   Last Admin: 08/22/18 04:38 Dose:  10 mg


Mometasone Furoate/Formoterol Fumar (Dulera 100 Mcg/5 Mcg Inhaler)  2 puff INH 

BID-RT [facility]


   Last Admin: 08/25/18 06:57 Dose:  2 puff


Nitroglycerin (Nitrostat)  0.4 mg PO Q5MIN PRN


   PRN Reason: Chest Pain


Pantoprazole Sodium (Protonix)  40 mg PO DAILY [facility]


   Last Admin: 08/25/18 08:44 Dose:  40 mg


Polyethylene Glycol (Miralax)  17 gm PO DAILY [facility]


   Last Admin: 08/25/18 08:44 Dose:  17 gm


Saccharomyces Boulardii (Florastor)  250 mg PO DAILY [facility]


   Last Admin: 08/25/18 08:44 Dose:  250 mg


Senna (Senokot)  2 tab PO HSPRN PRN


   PRN Reason: Constipation


   Last Admin: 08/22/18 10:01 Dose:  2 tab


Sodium Chloride (Flush - Normal Saline)  10 ml IVF Q12HR [facility]


   Last Admin: 08/25/18 08:44 Dose:  10 ml


Sodium Chloride (Flush - Normal Saline)  10 ml IVF PRN PRN


   PRN Reason: Saline Flush


Terazosin HCl (Hytrin)  5 mg PO DAILY [facility]


   Last Admin: 08/25/18 08:44 Dose:  5 mg

## 2018-08-25 NOTE — ULT
BILATERAL LOWER EXTREMITY VENOUS DUPLEX EXAM:

8/25/18

 

Deep veins of both lower extremities evaluated with color doppler with spectral analysis and compress
ion studies.

 

INDICATIONS:

Lower extremity pain and edema. Assess for DVT. 

 

FINDINGS:  

Nonocclusive thrombus seen in the mid thigh involving the femoral vein. There is a partial area of ec
hogenic thrombus seen at this area. There is partial compression and there is partial flow present. 

 

There is similar area of nonocclusive thrombus in the proximal popliteal vein. There is partial compr
ession and partial flow seen at this location as well. 

 

Otherwise, the veins of both lower extremities show normal flow and compression. 

 

IMPRESSION:  

Focus of nonocclusive thrombus in the mid left femoral vein and in the proximal left popliteal vein. 
The presence of partial compression would suggest possibly chronic thrombus. Acute thrombus is not ex
cluded. 

 

No evidence of DVT seen in the right lower extremity. 

 

The technologist notified patient's nurse of these findings at time of exam. 

 

 

 

POS: [facility]

## 2018-08-25 NOTE — RAD
PORTABLE CHEST:

 

HISTORY: 

Followup pneumonia.

 

COMPARISON: 

8/2/117.

 

FINDINGS: 

Continued right lower lobe infiltrate is noted.  Left lung remains clear.  Heart and mediastinum unre
markable.  

 

IMPRESSION: 

Persistent right lower lobe infiltrate/atelectasis.

 

POS: [facility]

## 2018-08-26 NOTE — DIS
DATE OF ADMISSION:  08/21/2018

 

DATE OF DISCHARGE:  08/25/2018

 

DISCHARGE DISPOSITION:  To home.

 

PRIMARY DISCHARGE DIAGNOSES:  Right lung pneumonia, deep venous thrombosis.

 

SECONDARY DISCHARGE DIAGNOSES:  Congestive heart failure with diastolic dysfunction with ejection fra
ction of 55%, coronary artery disease, chronic atrial fibrillation, chronic anemia, diabetes mellitus
 type 2, gastroesophageal reflux disease, hypertension, morbid obesity, obstructive sleep apnea on CP
AP.

 

PROCEDURES DONE DURING HOSPITALIZATION:  Chest x-ray done showed right lower lobe pneumonia.  Ultraso
und venous Doppler of lower extremities done showed focus of nonocclusive thrombus in mid left femora
l vein and in the proximal left popliteal vein.  No evidence of DVT was seen in the right lower extre
mity.  Blood cultures x2 no growth.

 

LABORATORY DATA:  H&H 9.5 and 29, platelet count 134.  BUN 27, creatinine 1.1.  .  Troponin x3
 was negative.

 

DISCHARGE MEDICATIONS:  Eliquis 10 mg p.o. twice daily for 7 days and then 5 mg p.o. twice daily ther
eafter, aspirin 81 mg p.o. daily, vitamin C 1000 mg p.o. twice daily, Lipitor 80 mg p.o. at bedtime, 
calcium with vitamin D3 one tab twice daily, vitamin B12 of 1000 mcg once a month, Multaq 400 mg twic
e daily, Breo Ellipta inhaler daily, Lasix 40 mg daily, Neurontin 900 mg p.o. twice daily, glipizide 
10 mg daily, BiDil 20/37.5 mg p.o. 3 times daily, metformin 1000 mg twice daily, terazosin 5 mg daily
, Augmentin 875 mg 1 tablet twice daily for 6 more days for pneumonia, Coreg 6.25 mg p.o. 3 times alanna
ly, ferrous sulfate 325 mg p.o. twice daily, Lantus 30 units subcutaneous q.a.m.

 

ALLERGIES:  DIAZEPAM, IODINE, MACROBID, SULFA.

 

INPATIENT CONSULTS:  Cardiology, Dr. Carlos.

 

DISCHARGE PLAN:  Patient to follow up with Dr. Porfirio Donaldson, his primary care physician in 7-10 days.


 

BRIEF COURSE DURING HOSPITALIZATION:  Patient initially got admitted on the 22nd with complaints of c
ough, shortness of breath with chills.  Initial workup revealed right lower lobe pneumonia.  He was p
laced on IV antibiotics.  Also patient had mild diastolic dysfunction and was gently diuresed with IV
 Lasix.  Patient complained of left lower extremity calf area pain.  Ultrasound venous Doppler was do
ne, which revealed nonocclusive thrombus in the left lower extremity.  Patient has been off his venou
s pump that he uses at home for the last 6 or 8 weeks or so now, which might have led to his current 
DVT.  Again, the nonocclusive thrombus could not be confirmed as either acute or chronic.  Patient ha
s been on Eliquis in the past and his hemoglobin had dropped down to 5 grams from unknown reason per 
patient.  This was more than 2 years ago.  In view of his current DVT and poor functional status, he 
is at risk for pulmonary embolism and he has been placed on Eliquis.  The family clearly are aware of
 closely monitoring stool samples and CBC to check for hemoglobin.  If patient were to drop his hemog
lobin again, he might be a candidate for permanent IVC filter in view of poor functional status and D
VT.  He has remained hemodynamically stable all through his stay here.  His antibiotics have been swi
tched over to Augmentin.  He needs to continue this for another 6 days of antibiotics.  Please see a 
face-to-face documentation on [facility] for the day of discharge.

## 2018-08-29 ENCOUNTER — HOSPITAL ENCOUNTER (OUTPATIENT)
Dept: HOSPITAL 92 - WCC | Age: 83
Discharge: HOME | End: 2018-08-29
Attending: FAMILY MEDICINE
Payer: MEDICARE

## 2018-08-29 DIAGNOSIS — G47.33: ICD-10-CM

## 2018-08-29 DIAGNOSIS — K21.9: ICD-10-CM

## 2018-08-29 DIAGNOSIS — I25.10: ICD-10-CM

## 2018-08-29 DIAGNOSIS — I89.0: ICD-10-CM

## 2018-08-29 DIAGNOSIS — L97.519: ICD-10-CM

## 2018-08-29 DIAGNOSIS — Z86.79: ICD-10-CM

## 2018-08-29 DIAGNOSIS — I11.9: ICD-10-CM

## 2018-08-29 DIAGNOSIS — E11.621: Primary | ICD-10-CM

## 2018-08-29 PROCEDURE — 36416 COLLJ CAPILLARY BLOOD SPEC: CPT

## 2018-08-29 NOTE — PRG
DATE OF SERVICE:  08/29/2018

 

HISTORY:  Mr. Martín Snider is a very pleasant 83-year-old gentleman accompanied by his wife wh
o presents to the [facility] for evaluation of an ulceration of the right lateral foot.  The patien
gerardo has completed a course of treatment with MatriStem in conjunction with negative pressure therapy fo
r treatment of the ulceration of the right lateral foot.  Since the patient's last visit to the Wound
 Center on 07/23/2018, Mr. Snider was admitted to [facility] for pneumonia 
and deep venous thrombosis of the left lower extremity.  The patient has no complaints today.  He den
ies any fever or chills.

 

PHYSICAL EXAMINATION:

VITAL SIGNS:  Temperature 97.8, pulse 60, respirations 20, blood pressure 128/60.  Accu-Chek 80.

EXTREMITIES:  An ulceration of the right lateral foot is present, which measures approximately 6.8 x 
3.6 cm.  Granulation tissue is present within the wound margins.  Bone is palpable with a curette wit
hin the wound margins.  No purulent drainage is associated with the wound.  No cellulitis of the righ
t foot is appreciated.  No maceration of the skin of the periwound is noted.

 

ASSESSMENT AND PLAN:

1.  Ulceration of right lateral foot as described above.  Arrangements will be made for a plain film 
of the right foot to look for findings suggestive of osteomyelitis.  Dressing changes of Medihoney, 4
 x 4s, ABD, Kerlix, and an Ace bandage will be initiated today.  These dressing changes are to be per
formed 3 times per week after cleansing and irrigation with the assistance of Home Health.  I have ex
plained to the patient that MRI of the right foot may need to be obtained if plain films of the right
 foot show no findings consistent with osteomyelitis.  The patient and his wife understand and are in
 agreement with the preceding treatment plan.

2.  Lymphedema.

3.  Coronary artery disease.

4.  Hypertension.

5.  Ankylosing spondylitis.

6.  Obstructive sleep apnea.

7.  History of paroxysmal atrial fibrillation.

8.  Gastroesophageal reflux disease.

9.  Diabetes mellitus.  The patient's Accu-Chek in clinic today is 80.  The patient has been reminded
 that for optimal wound healing, his blood glucoses should remain below 150.

## 2018-08-29 NOTE — RAD
RIGHT FOOT THREE VIEWS:

 

HISTORY:

Pain.

 

COMPARISON:

Foot radiograph from 2017.

 

FINDINGS:

There are extensive periosteal new bone formations about the metatarsals.  There are fractures of the
 great toe, proximal phalanx, as well as the proximal phalanx of the small toe.  Mild osteopenia.  Th
ere appears to be severe soft tissue swelling at the dorsal aspect of the midfoot and forefoot.

 

Possible subcutaneous gas along the hindfoot.  Large ulcer along the lateral margin of the midfoot.

 

IMPRESSION:

1.  Findings suggestive of severe osteomyelitis throughout the foot with fractures of the great toe p
roximal phalanx base and small toe proximal phalanx base.

 

2.  Possible subcutaneous emphysema.

 

3.  Abnormal sheet-like calcification along the distal tibia.  This could be reflective of fascial ca
lcification from old injury versus a periosteal reaction from osteomyelitis.

 

POS: [facility]

## 2018-09-12 ENCOUNTER — HOSPITAL ENCOUNTER (OUTPATIENT)
Dept: HOSPITAL 92 - MRI | Age: 83
Discharge: HOME | End: 2018-09-12
Attending: INTERNAL MEDICINE
Payer: MEDICARE

## 2018-09-12 DIAGNOSIS — L97.519: Primary | ICD-10-CM

## 2018-09-12 DIAGNOSIS — Q66.0: ICD-10-CM

## 2018-09-12 DIAGNOSIS — R93.7: ICD-10-CM

## 2018-09-12 PROCEDURE — A9579 GAD-BASE MR CONTRAST NOS,1ML: HCPCS

## 2018-09-12 NOTE — MRI
MRI RIGHT FOREFOOT WITH AND WITHOUT IV CONTRAST:

 

PROVIDED CLINICAL HISTORY:

Osteomyelitis.

 

FINDINGS:

There is signal alteration involving the subcutaneous adipose layer, diffusely, about the midfoot and
 hindfoot, predominating at the dorsum of the foot.  There is no focal fluid collection to suggest ab
scess.  There is cutaneous irregularity at the lateral aspect of the forefoot, compatible with a woun
d.  There is an abnormal appearance to the fifth metatarsal, rather diffusely, with some sparing of t
he fifth metatarsal base.  There is diminished T1 marrow signal intensity with increased signal inten
sity involving the marrow on fluid sensitive sequences and associated contrast enhancement.  There is
 cortical thickening/periosteal reaction present.

 

There is signal alteration involving the fourth metatarsal head, primarily on fluid sensitive sequenc
es, without definite signal alteration on the T1 weight sequence.  There is signal alteration present
 at the anterior aspect of the calcaneus, near the calcaneocuboid joint, on fluid sensitive sequences
, not optimally evaluated on this examination.

 

There is diffuse fatty infiltration of the intrinsic foot musculature.  No evidence for infectious te
nosynovitis.  No regional joint effusion is evident.  No additional abnormal contrast enhancement is 
seen with first MTP joint degenerative changes noted.

 

IMPRESSION:

1.  Findings compatible with osteomyelitis involving the fifth metatarsal.

 

2.  Signal alteration involving the anterior calcaneus, adjacent to the calcaneal cuboid joint, may r
eflect stress related marrow edema, related to altered weight bearing.  Infectious etiologies are con
sidered less likely, given lack of proximity to the wound.  Followup may be useful.

 

3.  Similarly, there is signal alteration involving the fourth metatarsal head that may reflect stres
s related marrow edema.  Infection cannot be excluded.

 

4.  Diffuse signal alteration and thickening at the subcutaneous adipose layer about the forefoot and
 midfoot, predominating at the dorsum.  Findings likely reflect a combination of cellulitis and lymph
edema.  No evidence for soft tissue abscess.

 

POS: [facility]

## 2018-09-20 ENCOUNTER — HOSPITAL ENCOUNTER (OUTPATIENT)
Dept: HOSPITAL 92 - SPEC | Age: 83
End: 2018-09-20
Attending: INTERNAL MEDICINE
Payer: MEDICARE

## 2018-09-20 VITALS — TEMPERATURE: 97.4 F | SYSTOLIC BLOOD PRESSURE: 146 MMHG | DIASTOLIC BLOOD PRESSURE: 66 MMHG

## 2018-09-20 DIAGNOSIS — Z79.899: ICD-10-CM

## 2018-09-20 DIAGNOSIS — Z88.2: ICD-10-CM

## 2018-09-20 DIAGNOSIS — Z91.041: ICD-10-CM

## 2018-09-20 DIAGNOSIS — E11.69: Primary | ICD-10-CM

## 2018-09-20 DIAGNOSIS — Z79.82: ICD-10-CM

## 2018-09-20 DIAGNOSIS — Z79.2: ICD-10-CM

## 2018-09-20 DIAGNOSIS — Z88.8: ICD-10-CM

## 2018-09-20 DIAGNOSIS — Z79.4: ICD-10-CM

## 2018-09-20 DIAGNOSIS — Z79.01: ICD-10-CM

## 2018-09-20 DIAGNOSIS — M86.8X7: ICD-10-CM

## 2018-09-20 PROCEDURE — 96367 TX/PROPH/DG ADDL SEQ IV INF: CPT

## 2018-09-20 PROCEDURE — 36569 INSJ PICC 5 YR+ W/O IMAGING: CPT

## 2018-09-20 PROCEDURE — 96365 THER/PROPH/DIAG IV INF INIT: CPT

## 2018-09-20 PROCEDURE — A4216 STERILE WATER/SALINE, 10 ML: HCPCS

## 2018-09-20 PROCEDURE — C1751 CATH, INF, PER/CENT/MIDLINE: HCPCS

## 2018-09-20 PROCEDURE — 02H633Z INSERTION OF INFUSION DEVICE INTO RIGHT ATRIUM, PERCUTANEOUS APPROACH: ICD-10-PCS

## 2018-09-20 NOTE — SPC
LEFT PICC LINE PLACEMENT WITH ULTRASOUND GUIDANCE.

9/20/18

 

HISTORY: 

Infection. IV antibiotics required.

 

COMPARISON:  

None.

 

EXPOSURE:

8 milliseconds. 

 

FINDINGS:  

Successful left upper extremity PICC line placement with ultrasound guidance. 55 cm dual lumen 5 Fren
ch catheter terminates with the distal tip in the right atrium. Both lumens flush and aspirate withou
t difficult. Approach is via the left basilic vein.

 

TECHNIQUE:  

Consent obtained to perform an ultrasound guided PICC line of the left upper extremity. The left arm 
was prepped and draped in sterile fashion. 1% lidocaine, buffered with sodium bicarbonate used for lo
mc anesthesia. Under sonographic guidance, a micropuncture needle was used to access the basilic vei
n. A 0.018 guide wire was advanced through the needle to the level of the right atrium. Tract is dila
prashanth. Dual lumen 5 Frisian catheter was advanced over the wire. Wire was removed. Trim length is 55 cm.
 Distal tip in the right atrium. Both lumens flushed and aspirate without difficulty. 

 

IMPRESSION:  

Successful left upper extremity PICC line placement with ultrasound guidance. 

 

POS: [facility]

## 2018-10-13 ENCOUNTER — HOSPITAL ENCOUNTER (OUTPATIENT)
Dept: HOSPITAL 57 - BUR/OP | Age: 83
End: 2018-10-13
Payer: MEDICARE

## 2018-10-13 DIAGNOSIS — M86.171: Primary | ICD-10-CM

## 2018-10-13 DIAGNOSIS — Z91.041: ICD-10-CM

## 2018-10-13 DIAGNOSIS — Z88.2: ICD-10-CM

## 2018-10-13 DIAGNOSIS — Z88.8: ICD-10-CM

## 2018-10-13 PROCEDURE — 96366 THER/PROPH/DIAG IV INF ADDON: CPT

## 2018-10-13 PROCEDURE — 99201: CPT

## 2018-10-13 PROCEDURE — 96365 THER/PROPH/DIAG IV INF INIT: CPT

## 2018-10-13 PROCEDURE — G0463 HOSPITAL OUTPT CLINIC VISIT: HCPCS

## 2018-10-13 PROCEDURE — 96367 TX/PROPH/DG ADDL SEQ IV INF: CPT

## 2018-10-14 ENCOUNTER — HOSPITAL ENCOUNTER (OUTPATIENT)
Dept: HOSPITAL 57 - BUR/OP | Age: 83
End: 2018-10-14
Payer: MEDICARE

## 2018-10-14 VITALS — TEMPERATURE: 97.6 F | SYSTOLIC BLOOD PRESSURE: 203 MMHG | DIASTOLIC BLOOD PRESSURE: 89 MMHG

## 2018-10-14 VITALS — DIASTOLIC BLOOD PRESSURE: 83 MMHG | SYSTOLIC BLOOD PRESSURE: 205 MMHG | TEMPERATURE: 98.2 F

## 2018-10-14 DIAGNOSIS — Z91.041: ICD-10-CM

## 2018-10-14 DIAGNOSIS — Z88.8: ICD-10-CM

## 2018-10-14 DIAGNOSIS — M86.171: Primary | ICD-10-CM

## 2018-10-14 DIAGNOSIS — Z88.2: ICD-10-CM

## 2018-10-14 PROCEDURE — 99201: CPT

## 2018-10-14 PROCEDURE — G0463 HOSPITAL OUTPT CLINIC VISIT: HCPCS

## 2018-10-14 PROCEDURE — 96366 THER/PROPH/DIAG IV INF ADDON: CPT

## 2018-10-14 PROCEDURE — 96367 TX/PROPH/DG ADDL SEQ IV INF: CPT

## 2018-10-14 PROCEDURE — 96365 THER/PROPH/DIAG IV INF INIT: CPT

## 2018-10-15 ENCOUNTER — HOSPITAL ENCOUNTER (OUTPATIENT)
Dept: HOSPITAL 57 - BUR/OP | Age: 83
End: 2018-10-15
Payer: MEDICARE

## 2018-10-15 VITALS — DIASTOLIC BLOOD PRESSURE: 62 MMHG | TEMPERATURE: 97.7 F | SYSTOLIC BLOOD PRESSURE: 140 MMHG

## 2018-10-15 DIAGNOSIS — Z91.041: ICD-10-CM

## 2018-10-15 DIAGNOSIS — Z88.2: ICD-10-CM

## 2018-10-15 DIAGNOSIS — M86.171: Primary | ICD-10-CM

## 2018-10-15 DIAGNOSIS — Z88.8: ICD-10-CM

## 2018-10-15 LAB
ALBUMIN SERPL BCG-MCNC: 3.3 G/DL (ref 3.4–4.8)
ALP SERPL-CCNC: 75 U/L (ref 40–150)
ALT SERPL W P-5'-P-CCNC: 25 U/L (ref 8–55)
ANION GAP SERPL CALC-SCNC: 17 MMOL/L (ref 10–20)
AST SERPL-CCNC: 24 U/L (ref 5–34)
BILIRUB SERPL-MCNC: 0.5 MG/DL (ref 0.2–1.2)
BUN SERPL-MCNC: 43 MG/DL (ref 8.4–25.7)
CALCIUM SERPL-MCNC: 8.4 MG/DL (ref 7.8–10.44)
CHLORIDE SERPL-SCNC: 102 MMOL/L (ref 98–107)
CO2 SERPL-SCNC: 30 MMOL/L (ref 23–31)
CREAT CL PREDICTED SERPL C-G-VRATE: 0 ML/MIN (ref 70–130)
GLOBULIN SER CALC-MCNC: 2.8 G/DL (ref 2.4–3.5)
GLUCOSE SERPL-MCNC: 180 MG/DL (ref 83–110)
HGB BLD-MCNC: 9.8 G/DL (ref 14–18)
MCH RBC QN AUTO: 30.5 PG (ref 27–31)
MCV RBC AUTO: 93 FL (ref 78–98)
PLATELET # BLD AUTO: 167 THOU/UL (ref 130–400)
POTASSIUM SERPL-SCNC: 3.7 MMOL/L (ref 3.5–5.1)
RBC # BLD AUTO: 3.2 MILL/UL (ref 4.7–6.1)
SODIUM SERPL-SCNC: 145 MMOL/L (ref 136–145)
VANCOMYCIN TROUGH SERPL-MCNC: 18.3 UG/ML
WBC # BLD AUTO: 7.3 THOU/UL (ref 4.8–10.8)

## 2018-10-15 PROCEDURE — 36415 COLL VENOUS BLD VENIPUNCTURE: CPT

## 2018-10-15 PROCEDURE — 85027 COMPLETE CBC AUTOMATED: CPT

## 2018-10-15 PROCEDURE — 80053 COMPREHEN METABOLIC PANEL: CPT

## 2018-10-15 PROCEDURE — 80202 ASSAY OF VANCOMYCIN: CPT

## 2018-10-16 ENCOUNTER — HOSPITAL ENCOUNTER (OUTPATIENT)
Dept: HOSPITAL 57 - BUR/OP | Age: 83
End: 2018-10-16
Payer: MEDICARE

## 2018-10-16 VITALS — SYSTOLIC BLOOD PRESSURE: 185 MMHG | DIASTOLIC BLOOD PRESSURE: 84 MMHG | TEMPERATURE: 97.6 F

## 2018-10-16 DIAGNOSIS — Z88.8: ICD-10-CM

## 2018-10-16 DIAGNOSIS — Z91.041: ICD-10-CM

## 2018-10-16 DIAGNOSIS — Z88.2: ICD-10-CM

## 2018-10-16 DIAGNOSIS — M86.171: Primary | ICD-10-CM

## 2018-10-16 PROCEDURE — 99201: CPT

## 2018-10-16 PROCEDURE — 96367 TX/PROPH/DG ADDL SEQ IV INF: CPT

## 2018-10-16 PROCEDURE — 96366 THER/PROPH/DIAG IV INF ADDON: CPT

## 2018-10-16 PROCEDURE — 96365 THER/PROPH/DIAG IV INF INIT: CPT

## 2018-10-16 PROCEDURE — G0463 HOSPITAL OUTPT CLINIC VISIT: HCPCS

## 2018-10-17 ENCOUNTER — HOSPITAL ENCOUNTER (OUTPATIENT)
Dept: HOSPITAL 57 - BUR/OP | Age: 83
End: 2018-10-17
Payer: MEDICARE

## 2018-10-17 VITALS — SYSTOLIC BLOOD PRESSURE: 182 MMHG | DIASTOLIC BLOOD PRESSURE: 80 MMHG | TEMPERATURE: 97.9 F

## 2018-10-17 DIAGNOSIS — Z79.4: ICD-10-CM

## 2018-10-17 DIAGNOSIS — E11.69: Primary | ICD-10-CM

## 2018-10-17 DIAGNOSIS — Z88.1: ICD-10-CM

## 2018-10-17 DIAGNOSIS — Z79.899: ICD-10-CM

## 2018-10-17 DIAGNOSIS — Z91.041: ICD-10-CM

## 2018-10-17 DIAGNOSIS — Z79.2: ICD-10-CM

## 2018-10-17 DIAGNOSIS — M86.171: ICD-10-CM

## 2018-10-17 DIAGNOSIS — Z88.8: ICD-10-CM

## 2018-10-17 DIAGNOSIS — Z79.01: ICD-10-CM

## 2018-10-17 DIAGNOSIS — Z79.82: ICD-10-CM

## 2018-10-17 DIAGNOSIS — Z88.2: ICD-10-CM

## 2018-10-17 PROCEDURE — G0463 HOSPITAL OUTPT CLINIC VISIT: HCPCS

## 2018-10-17 PROCEDURE — 99201: CPT

## 2018-10-17 PROCEDURE — 96365 THER/PROPH/DIAG IV INF INIT: CPT

## 2018-10-17 PROCEDURE — 96366 THER/PROPH/DIAG IV INF ADDON: CPT

## 2018-10-18 ENCOUNTER — HOSPITAL ENCOUNTER (OUTPATIENT)
Dept: HOSPITAL 57 - BUR/OP | Age: 83
End: 2018-10-18
Payer: MEDICARE

## 2018-10-18 VITALS — TEMPERATURE: 97.8 F | SYSTOLIC BLOOD PRESSURE: 119 MMHG | DIASTOLIC BLOOD PRESSURE: 57 MMHG

## 2018-10-18 DIAGNOSIS — Z79.4: ICD-10-CM

## 2018-10-18 DIAGNOSIS — Z88.2: ICD-10-CM

## 2018-10-18 DIAGNOSIS — Z88.1: ICD-10-CM

## 2018-10-18 DIAGNOSIS — Z88.8: ICD-10-CM

## 2018-10-18 DIAGNOSIS — Z79.899: ICD-10-CM

## 2018-10-18 DIAGNOSIS — Z79.01: ICD-10-CM

## 2018-10-18 DIAGNOSIS — M86.171: Primary | ICD-10-CM

## 2018-10-18 DIAGNOSIS — Z79.82: ICD-10-CM

## 2018-10-18 DIAGNOSIS — Z91.041: ICD-10-CM

## 2018-10-18 DIAGNOSIS — Z79.2: ICD-10-CM

## 2018-10-18 PROCEDURE — 96367 TX/PROPH/DG ADDL SEQ IV INF: CPT

## 2018-10-18 PROCEDURE — G0463 HOSPITAL OUTPT CLINIC VISIT: HCPCS

## 2018-10-18 PROCEDURE — 96365 THER/PROPH/DIAG IV INF INIT: CPT

## 2018-10-18 PROCEDURE — 99201: CPT

## 2018-10-19 ENCOUNTER — HOSPITAL ENCOUNTER (OUTPATIENT)
Dept: HOSPITAL 57 - BUR/OP | Age: 83
End: 2018-10-19
Payer: MEDICARE

## 2018-10-19 VITALS — SYSTOLIC BLOOD PRESSURE: 167 MMHG | DIASTOLIC BLOOD PRESSURE: 72 MMHG

## 2018-10-19 DIAGNOSIS — Z79.899: ICD-10-CM

## 2018-10-19 DIAGNOSIS — M86.171: Primary | ICD-10-CM

## 2018-10-19 DIAGNOSIS — Z88.2: ICD-10-CM

## 2018-10-19 DIAGNOSIS — Z91.041: ICD-10-CM

## 2018-10-19 DIAGNOSIS — Z79.2: ICD-10-CM

## 2018-10-19 DIAGNOSIS — Z79.4: ICD-10-CM

## 2018-10-19 DIAGNOSIS — Z88.1: ICD-10-CM

## 2018-10-19 DIAGNOSIS — Z88.8: ICD-10-CM

## 2018-10-19 DIAGNOSIS — Z79.01: ICD-10-CM

## 2018-10-19 PROCEDURE — 96365 THER/PROPH/DIAG IV INF INIT: CPT

## 2018-10-19 PROCEDURE — G0463 HOSPITAL OUTPT CLINIC VISIT: HCPCS

## 2018-10-19 PROCEDURE — 96367 TX/PROPH/DG ADDL SEQ IV INF: CPT

## 2018-10-19 PROCEDURE — 96366 THER/PROPH/DIAG IV INF ADDON: CPT

## 2018-10-19 PROCEDURE — 99201: CPT

## 2018-10-20 ENCOUNTER — HOSPITAL ENCOUNTER (OUTPATIENT)
Dept: HOSPITAL 57 - BUR/OP | Age: 83
End: 2018-10-20
Payer: MEDICARE

## 2018-10-20 VITALS — DIASTOLIC BLOOD PRESSURE: 64 MMHG | SYSTOLIC BLOOD PRESSURE: 139 MMHG | TEMPERATURE: 97.5 F

## 2018-10-20 DIAGNOSIS — Z79.2: ICD-10-CM

## 2018-10-20 DIAGNOSIS — Z88.8: ICD-10-CM

## 2018-10-20 DIAGNOSIS — Z88.1: ICD-10-CM

## 2018-10-20 DIAGNOSIS — Z88.2: ICD-10-CM

## 2018-10-20 DIAGNOSIS — Z79.82: ICD-10-CM

## 2018-10-20 DIAGNOSIS — Z79.4: ICD-10-CM

## 2018-10-20 DIAGNOSIS — M86.171: Primary | ICD-10-CM

## 2018-10-20 DIAGNOSIS — Z79.899: ICD-10-CM

## 2018-10-20 DIAGNOSIS — Z79.01: ICD-10-CM

## 2018-10-20 DIAGNOSIS — Z91.041: ICD-10-CM

## 2018-10-20 PROCEDURE — 96366 THER/PROPH/DIAG IV INF ADDON: CPT

## 2018-10-20 PROCEDURE — 96365 THER/PROPH/DIAG IV INF INIT: CPT

## 2018-10-20 PROCEDURE — 99201: CPT

## 2018-10-20 PROCEDURE — 96367 TX/PROPH/DG ADDL SEQ IV INF: CPT

## 2018-10-20 PROCEDURE — G0463 HOSPITAL OUTPT CLINIC VISIT: HCPCS

## 2018-10-21 ENCOUNTER — HOSPITAL ENCOUNTER (OUTPATIENT)
Dept: HOSPITAL 57 - BUR/OP | Age: 83
End: 2018-10-21
Payer: MEDICARE

## 2018-10-21 VITALS — TEMPERATURE: 97.7 F | SYSTOLIC BLOOD PRESSURE: 188 MMHG | DIASTOLIC BLOOD PRESSURE: 79 MMHG

## 2018-10-21 DIAGNOSIS — Z79.4: ICD-10-CM

## 2018-10-21 DIAGNOSIS — Z88.8: ICD-10-CM

## 2018-10-21 DIAGNOSIS — Z79.2: ICD-10-CM

## 2018-10-21 DIAGNOSIS — Z88.2: ICD-10-CM

## 2018-10-21 DIAGNOSIS — Z79.899: ICD-10-CM

## 2018-10-21 DIAGNOSIS — Z79.82: ICD-10-CM

## 2018-10-21 DIAGNOSIS — Z88.1: ICD-10-CM

## 2018-10-21 DIAGNOSIS — M86.171: Primary | ICD-10-CM

## 2018-10-21 DIAGNOSIS — Z79.01: ICD-10-CM

## 2018-10-21 DIAGNOSIS — Z91.041: ICD-10-CM

## 2018-10-21 PROCEDURE — G0463 HOSPITAL OUTPT CLINIC VISIT: HCPCS

## 2018-10-21 PROCEDURE — 96365 THER/PROPH/DIAG IV INF INIT: CPT

## 2018-10-21 PROCEDURE — 96366 THER/PROPH/DIAG IV INF ADDON: CPT

## 2018-10-21 PROCEDURE — 96367 TX/PROPH/DG ADDL SEQ IV INF: CPT

## 2018-10-21 PROCEDURE — 99201: CPT

## 2018-10-22 ENCOUNTER — HOSPITAL ENCOUNTER (OUTPATIENT)
Dept: HOSPITAL 57 - BUR/OP | Age: 83
End: 2018-10-22
Payer: MEDICARE

## 2018-10-22 VITALS — SYSTOLIC BLOOD PRESSURE: 170 MMHG | DIASTOLIC BLOOD PRESSURE: 72 MMHG | TEMPERATURE: 98 F

## 2018-10-22 DIAGNOSIS — M86.171: Primary | ICD-10-CM

## 2018-10-22 LAB
ALBUMIN SERPL BCG-MCNC: 3.2 G/DL (ref 3.4–4.8)
ALP SERPL-CCNC: 88 U/L (ref 40–150)
ALT SERPL W P-5'-P-CCNC: 25 U/L (ref 8–55)
ANION GAP SERPL CALC-SCNC: 17 MMOL/L (ref 10–20)
AST SERPL-CCNC: 25 U/L (ref 5–34)
BILIRUB SERPL-MCNC: 0.7 MG/DL (ref 0.2–1.2)
BUN SERPL-MCNC: 20 MG/DL (ref 8.4–25.7)
CALCIUM SERPL-MCNC: 8.8 MG/DL (ref 7.8–10.44)
CHLORIDE SERPL-SCNC: 101 MMOL/L (ref 98–107)
CO2 SERPL-SCNC: 28 MMOL/L (ref 23–31)
CREAT CL PREDICTED SERPL C-G-VRATE: 0 ML/MIN (ref 70–130)
GLOBULIN SER CALC-MCNC: 2.9 G/DL (ref 2.4–3.5)
GLUCOSE SERPL-MCNC: 195 MG/DL (ref 83–110)
HGB BLD-MCNC: 10.2 G/DL (ref 14–18)
MCH RBC QN AUTO: 29.9 PG (ref 27–31)
MCV RBC AUTO: 92 FL (ref 78–98)
PLATELET # BLD AUTO: 212 THOU/UL (ref 130–400)
POTASSIUM SERPL-SCNC: 3.9 MMOL/L (ref 3.5–5.1)
RBC # BLD AUTO: 3.42 MILL/UL (ref 4.7–6.1)
SODIUM SERPL-SCNC: 142 MMOL/L (ref 136–145)
VANCOMYCIN TROUGH SERPL-MCNC: 16.9 UG/ML
WBC # BLD AUTO: 6.7 THOU/UL (ref 4.8–10.8)

## 2018-10-22 PROCEDURE — 80202 ASSAY OF VANCOMYCIN: CPT

## 2018-10-22 PROCEDURE — 99201: CPT

## 2018-10-22 PROCEDURE — 36415 COLL VENOUS BLD VENIPUNCTURE: CPT

## 2018-10-22 PROCEDURE — G0463 HOSPITAL OUTPT CLINIC VISIT: HCPCS

## 2018-10-22 PROCEDURE — 96365 THER/PROPH/DIAG IV INF INIT: CPT

## 2018-10-22 PROCEDURE — 96376 TX/PRO/DX INJ SAME DRUG ADON: CPT

## 2018-10-22 PROCEDURE — 80053 COMPREHEN METABOLIC PANEL: CPT

## 2018-10-22 PROCEDURE — 96367 TX/PROPH/DG ADDL SEQ IV INF: CPT

## 2018-10-22 PROCEDURE — 85027 COMPLETE CBC AUTOMATED: CPT

## 2018-10-23 ENCOUNTER — HOSPITAL ENCOUNTER (OUTPATIENT)
Dept: HOSPITAL 57 - BUR/OP | Age: 83
End: 2018-10-23
Payer: MEDICARE

## 2018-10-23 VITALS — TEMPERATURE: 98.2 F | DIASTOLIC BLOOD PRESSURE: 74 MMHG | SYSTOLIC BLOOD PRESSURE: 176 MMHG

## 2018-10-23 DIAGNOSIS — M86.171: Primary | ICD-10-CM

## 2018-10-23 PROCEDURE — G0463 HOSPITAL OUTPT CLINIC VISIT: HCPCS

## 2018-10-23 PROCEDURE — 96366 THER/PROPH/DIAG IV INF ADDON: CPT

## 2018-10-23 PROCEDURE — 99201: CPT

## 2018-10-23 PROCEDURE — 96365 THER/PROPH/DIAG IV INF INIT: CPT

## 2018-10-23 PROCEDURE — 96367 TX/PROPH/DG ADDL SEQ IV INF: CPT

## 2018-10-24 ENCOUNTER — HOSPITAL ENCOUNTER (OUTPATIENT)
Dept: HOSPITAL 57 - BUR/OP | Age: 83
End: 2018-10-24
Payer: MEDICARE

## 2018-10-24 VITALS — SYSTOLIC BLOOD PRESSURE: 158 MMHG | DIASTOLIC BLOOD PRESSURE: 69 MMHG

## 2018-10-24 DIAGNOSIS — M86.171: Primary | ICD-10-CM

## 2018-10-24 DIAGNOSIS — Z91.041: ICD-10-CM

## 2018-10-24 DIAGNOSIS — Z88.2: ICD-10-CM

## 2018-10-24 DIAGNOSIS — Z88.8: ICD-10-CM

## 2018-10-24 PROCEDURE — 99201: CPT

## 2018-10-24 PROCEDURE — 96365 THER/PROPH/DIAG IV INF INIT: CPT

## 2018-10-24 PROCEDURE — 96367 TX/PROPH/DG ADDL SEQ IV INF: CPT

## 2018-10-24 PROCEDURE — G0463 HOSPITAL OUTPT CLINIC VISIT: HCPCS

## 2018-11-14 ENCOUNTER — HOSPITAL ENCOUNTER (OUTPATIENT)
Dept: HOSPITAL 92 - WCC | Age: 83
Discharge: HOME | End: 2018-11-14
Attending: FAMILY MEDICINE
Payer: MEDICARE

## 2018-11-14 DIAGNOSIS — I89.0: ICD-10-CM

## 2018-11-14 DIAGNOSIS — E11.621: Primary | ICD-10-CM

## 2018-11-14 DIAGNOSIS — Z86.79: ICD-10-CM

## 2018-11-14 DIAGNOSIS — L97.519: ICD-10-CM

## 2018-11-14 DIAGNOSIS — I25.10: ICD-10-CM

## 2018-11-14 DIAGNOSIS — M45.9: ICD-10-CM

## 2018-11-14 DIAGNOSIS — I10: ICD-10-CM

## 2018-11-14 DIAGNOSIS — K21.9: ICD-10-CM

## 2018-11-14 DIAGNOSIS — G47.33: ICD-10-CM

## 2018-11-14 PROCEDURE — 97602 WOUND(S) CARE NON-SELECTIVE: CPT

## 2018-11-14 PROCEDURE — A4218 STERILE SALINE OR WATER: HCPCS

## 2018-11-14 PROCEDURE — 36416 COLLJ CAPILLARY BLOOD SPEC: CPT

## 2018-11-14 NOTE — PRG
DATE OF SERVICE:  11/14/2018

 

HISTORY:  Mr. Martín Snider is a very pleasant 83-year-old gentleman accompanied by his wife wh
sheyla presents to the Wound Center for evaluation of an ulceration of the right lateral foot.  The patien
t has completed a course of treatment with MatriStem in conjunction with negative pressure therapy fo
r treatment of the ulceration of the right lateral foot.  The patient has also received a course of I
V antibiotics for osteomyelitis of the right foot as per Infectious Diseases.  Mr. Snider has no c
omplaints today.  He denies any fever or chills.

 

PHYSICAL EXAMINATION:

VITAL SIGNS:  Temperature 97.5, pulse 66, respirations 18, blood pressure 171/72.  Accu-Chek 240.

EXTREMITIES:  An ulceration of the right lateral foot is present, which measures approximately 2.0 x 
0.8 cm.  No purulent drainage is associated with the wound.  No cellulitis of the right foot is appre
ciated.  No maceration of the skin of the periwound is noted.  Edema of the right and left feet and l
ower legs is present on exam today.

 

ASSESSMENT AND PLAN:

1.  Ulceration of right lateral foot as described above.  The wound has almost healed completely.  Dr edilia castro of Medihoney, 4x4s, Kerlix and an Ace bandage will be continued 3 times per week after
 cleansing and irrigation with the assistance of Home Health.  Mr. Snider will be discharged from 
clinic today with followup on an as needed basis.

2.  Lymphedema.

3.  Coronary artery disease.

4.  Hypertension.

5.  Ankylosing spondylitis.

6.  Obstructive sleep apnea.

7.  History of paroxysmal atrial fibrillation.

8.  Gastroesophageal reflux disease.

9.  Diabetes mellitus.  The patient's Accu-Chek in clinic today is 240.  The patient has been reminde
d that for optimal wound healing, his blood glucoses should remain below 150.

## 2018-12-28 ENCOUNTER — HOSPITAL ENCOUNTER (INPATIENT)
Dept: HOSPITAL 92 - ERS | Age: 83
LOS: 14 days | Discharge: HOME HEALTH | DRG: 673 | End: 2019-01-11
Attending: FAMILY MEDICINE | Admitting: FAMILY MEDICINE
Payer: MEDICARE

## 2018-12-28 VITALS — BODY MASS INDEX: 43 KG/M2

## 2018-12-28 DIAGNOSIS — M45.9: ICD-10-CM

## 2018-12-28 DIAGNOSIS — I50.33: ICD-10-CM

## 2018-12-28 DIAGNOSIS — Z74.01: ICD-10-CM

## 2018-12-28 DIAGNOSIS — Z79.4: ICD-10-CM

## 2018-12-28 DIAGNOSIS — E87.5: ICD-10-CM

## 2018-12-28 DIAGNOSIS — Z88.1: ICD-10-CM

## 2018-12-28 DIAGNOSIS — Z79.01: ICD-10-CM

## 2018-12-28 DIAGNOSIS — N18.6: ICD-10-CM

## 2018-12-28 DIAGNOSIS — Z79.899: ICD-10-CM

## 2018-12-28 DIAGNOSIS — E88.81: ICD-10-CM
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
## 2018-12-28 DIAGNOSIS — D63.1: ICD-10-CM

## 2018-12-28 DIAGNOSIS — Z95.5: ICD-10-CM

## 2018-12-28 DIAGNOSIS — Z91.041: ICD-10-CM

## 2018-12-28 DIAGNOSIS — Z86.718: ICD-10-CM

## 2018-12-28 DIAGNOSIS — I13.2: ICD-10-CM

## 2018-12-28 DIAGNOSIS — I25.10: ICD-10-CM

## 2018-12-28 DIAGNOSIS — G47.33: ICD-10-CM

## 2018-12-28 DIAGNOSIS — K21.9: ICD-10-CM

## 2018-12-28 DIAGNOSIS — Z88.8: ICD-10-CM

## 2018-12-28 DIAGNOSIS — E11.42: ICD-10-CM

## 2018-12-28 DIAGNOSIS — Z99.3: ICD-10-CM

## 2018-12-28 DIAGNOSIS — Z88.2: ICD-10-CM

## 2018-12-28 DIAGNOSIS — N17.0: Primary | ICD-10-CM

## 2018-12-28 DIAGNOSIS — E87.6: ICD-10-CM

## 2018-12-28 DIAGNOSIS — E78.5: ICD-10-CM

## 2018-12-28 DIAGNOSIS — E66.01: ICD-10-CM

## 2018-12-28 DIAGNOSIS — E11.22: ICD-10-CM

## 2018-12-28 DIAGNOSIS — I48.0: ICD-10-CM

## 2018-12-28 DIAGNOSIS — S82.002A: ICD-10-CM

## 2018-12-28 DIAGNOSIS — G82.20: ICD-10-CM

## 2018-12-28 LAB
ALBUMIN SERPL BCG-MCNC: 3.5 G/DL (ref 3.4–4.8)
ALP SERPL-CCNC: 67 U/L (ref 40–150)
ALT SERPL W P-5'-P-CCNC: 14 U/L (ref 8–55)
ANION GAP SERPL CALC-SCNC: 23 MMOL/L (ref 10–20)
AST SERPL-CCNC: 27 U/L (ref 5–34)
BASOPHILS # BLD AUTO: 0 THOU/UL (ref 0–0.2)
BASOPHILS NFR BLD AUTO: 0.5 % (ref 0–1)
BILIRUB SERPL-MCNC: 0.5 MG/DL (ref 0.2–1.2)
BUN SERPL-MCNC: 98 MG/DL (ref 8.4–25.7)
CALCIUM SERPL-MCNC: 9.4 MG/DL (ref 7.8–10.44)
CHLORIDE SERPL-SCNC: 99 MMOL/L (ref 98–107)
CK MB SERPL-MCNC: 3.6 NG/ML (ref 0–6.6)
CO2 SERPL-SCNC: 22 MMOL/L (ref 23–31)
CREAT CL PREDICTED SERPL C-G-VRATE: 0 ML/MIN (ref 70–130)
EOSINOPHIL # BLD AUTO: 0.3 THOU/UL (ref 0–0.7)
EOSINOPHIL NFR BLD AUTO: 5.6 % (ref 0–10)
GLOBULIN SER CALC-MCNC: 3.5 G/DL (ref 2.4–3.5)
GLUCOSE SERPL-MCNC: 151 MG/DL (ref 83–110)
HGB BLD-MCNC: 10.1 G/DL (ref 14–18)
LYMPHOCYTES # BLD: 1.3 THOU/UL (ref 1.2–3.4)
LYMPHOCYTES NFR BLD AUTO: 23 % (ref 21–51)
MCH RBC QN AUTO: 30.9 PG (ref 27–31)
MCV RBC AUTO: 93.6 FL (ref 78–98)
MONOCYTES # BLD AUTO: 0.7 THOU/UL (ref 0.11–0.59)
MONOCYTES NFR BLD AUTO: 12.2 % (ref 0–10)
NEUTROPHILS # BLD AUTO: 3.4 THOU/UL (ref 1.4–6.5)
NEUTROPHILS NFR BLD AUTO: 58.8 % (ref 42–75)
PLATELET # BLD AUTO: 141 THOU/UL (ref 130–400)
POTASSIUM SERPL-SCNC: 6.5 MMOL/L (ref 3.5–5.1)
RBC # BLD AUTO: 3.25 MILL/UL (ref 4.7–6.1)
SODIUM SERPL-SCNC: 137 MMOL/L (ref 136–145)
WBC # BLD AUTO: 5.7 THOU/UL (ref 4.8–10.8)

## 2018-12-28 PROCEDURE — P9016 RBC LEUKOCYTES REDUCED: HCPCS

## 2018-12-28 PROCEDURE — 86580 TB INTRADERMAL TEST: CPT

## 2018-12-28 PROCEDURE — 86901 BLOOD TYPING SEROLOGIC RH(D): CPT

## 2018-12-28 PROCEDURE — 85610 PROTHROMBIN TIME: CPT

## 2018-12-28 PROCEDURE — 84484 ASSAY OF TROPONIN QUANT: CPT

## 2018-12-28 PROCEDURE — 36415 COLL VENOUS BLD VENIPUNCTURE: CPT

## 2018-12-28 PROCEDURE — 86803 HEPATITIS C AB TEST: CPT

## 2018-12-28 PROCEDURE — 86706 HEP B SURFACE ANTIBODY: CPT

## 2018-12-28 PROCEDURE — 85730 THROMBOPLASTIN TIME PARTIAL: CPT

## 2018-12-28 PROCEDURE — 71045 X-RAY EXAM CHEST 1 VIEW: CPT

## 2018-12-28 PROCEDURE — 80053 COMPREHEN METABOLIC PANEL: CPT

## 2018-12-28 PROCEDURE — 36430 TRANSFUSION BLD/BLD COMPNT: CPT

## 2018-12-28 PROCEDURE — G0365 VESSEL MAPPING HEMO ACCESS: HCPCS

## 2018-12-28 PROCEDURE — 36556 INSERT NON-TUNNEL CV CATH: CPT

## 2018-12-28 PROCEDURE — 86900 BLOOD TYPING SEROLOGIC ABO: CPT

## 2018-12-28 PROCEDURE — 76770 US EXAM ABDO BACK WALL COMP: CPT

## 2018-12-28 PROCEDURE — C1752 CATH,HEMODIALYSIS,SHORT-TERM: HCPCS

## 2018-12-28 PROCEDURE — 83880 ASSAY OF NATRIURETIC PEPTIDE: CPT

## 2018-12-28 PROCEDURE — 93005 ELECTROCARDIOGRAM TRACING: CPT

## 2018-12-28 PROCEDURE — 80048 BASIC METABOLIC PNL TOTAL CA: CPT

## 2018-12-28 PROCEDURE — S0028 INJECTION, FAMOTIDINE, 20 MG: HCPCS

## 2018-12-28 PROCEDURE — 85049 AUTOMATED PLATELET COUNT: CPT

## 2018-12-28 PROCEDURE — 85025 COMPLETE CBC W/AUTO DIFF WBC: CPT

## 2018-12-28 PROCEDURE — G0257 UNSCHED DIALYSIS ESRD PT HOS: HCPCS

## 2018-12-28 PROCEDURE — 94640 AIRWAY INHALATION TREATMENT: CPT

## 2018-12-28 PROCEDURE — C1769 GUIDE WIRE: HCPCS

## 2018-12-28 PROCEDURE — P9047 ALBUMIN (HUMAN), 25%, 50ML: HCPCS

## 2018-12-28 PROCEDURE — 90935 HEMODIALYSIS ONE EVALUATION: CPT

## 2018-12-28 PROCEDURE — 5A1D70Z PERFORMANCE OF URINARY FILTRATION, INTERMITTENT, LESS THAN 6 HOURS PER DAY: ICD-10-PCS | Performed by: INTERNAL MEDICINE

## 2018-12-28 PROCEDURE — 93970 EXTREMITY STUDY: CPT

## 2018-12-28 PROCEDURE — 96365 THER/PROPH/DIAG IV INF INIT: CPT

## 2018-12-28 PROCEDURE — 86850 RBC ANTIBODY SCREEN: CPT

## 2018-12-28 PROCEDURE — 93306 TTE W/DOPPLER COMPLETE: CPT

## 2018-12-28 PROCEDURE — 82553 CREATINE MB FRACTION: CPT

## 2018-12-28 PROCEDURE — 81001 URINALYSIS AUTO W/SCOPE: CPT

## 2018-12-28 PROCEDURE — 36416 COLLJ CAPILLARY BLOOD SPEC: CPT

## 2018-12-28 PROCEDURE — 96375 TX/PRO/DX INJ NEW DRUG ADDON: CPT

## 2018-12-28 PROCEDURE — 86704 HEP B CORE ANTIBODY TOTAL: CPT

## 2018-12-28 PROCEDURE — 87340 HEPATITIS B SURFACE AG IA: CPT

## 2018-12-28 NOTE — HP
PRIMARY CARE PHYSICIAN:  Porfirio Donaldson MD



CHIEF COMPLAINT:  Fatigue.



HISTORY OF PRESENT ILLNESS:  This is an 83-year-old male with history of 
diastolic

heart failure, coronary artery disease, chronic atrial fibrillation, chronic 
anemia,

diabetes mellitus type 2, who presents to the emergency room with complaints of

fatigue.  The patient reports that he has been monitored closely over the past

couple of weeks by Dr. Dye due to a change in his renal function.  He notes 
over the

past 2 weeks that he has had more fatigue, generally has not been moving well,

although he is paraplegic and a wheelchair-bound.  He is also noted by Home 
Health

to gain 5 pounds in 1 day, with low oxygen levels which prompted this emergency 
room

visit.  



The patient states that his blood sugars have been well-controlled.  He denies 
any

headache, chest pain, nausea, vomiting, fever, chills, or coughing.  He does 
have a

little shortness of breath and states that his legs are always swollen.  Over 
the

past 2 weeks, he has noticed that his urine output has decreased despite taking 
Lasix and metolazone.  He had labs drawn yesterday, but was unaware of the 
results, also unaware of today's results. 



In the emergency room, the patient was found to have a creatinine of 6.6, 
potassium

of 6.5, and hospitalist called for admission.  He did receive D50 1 amp, 
Novolin R 5

units, albuterol 5 mg, sodium bicarbonate 1 amp, carvedilol 6.25 mg, Zosyn 
3.375 g,

aspirin 324 mg. 



ALLERGIES:  

1. BACTRIM.

2. IODINE.

3. MACROBID.

4. SODIUM HYPOCHLORITE.

5. SULFA.

6. VALIUM.



CURRENT MEDICATIONS:  Current medications are unknown.  The patient does not 
have a

list.  His wife does not have an accurate list.  Review of his past discharge

medications.  He is on, 

1. Eliquis and reports he takes 5 mg twice a day.

2. Other medications have not been confirmed or reviewed with him.



PAST MEDICAL HISTORY:  

1. Chronic diastolic heart failure.

2. Obstructive sleep apnea.

3. Chronic deconditioning with lower extremity paraplegia and wheel-chair 
dependence.

4. Diabetes mellitus, type 2.

5. Coronary artery disease with history of stent.

6. History of nonsustained ventricular tachycardia.

7. History of ankylosing spondylitis.

8. History of spinal cord injury.

9. History of GI bleeding.

10. Atrial fibrillation.

11. Hypertension.

12. Dyslipidemia.

13. Obesity.

14. DVT of the left lower extremity, diagnosed in august 2018.



PAST SURGICAL HISTORY:  

1. Had a loop recorder.

2. EGD and colonoscopy.

3. Hemorrhoidectomy.

4. Cardiac catheterization.

5. Finger surgery, status post trauma.



FAMILY HISTORY:  Significant for heart disease, multiple myeloma, and diabetes.



SOCIAL HISTORY:  The patient is in a wheelchair.  Lives at home with his wife 
who is

a surrogate decision maker and he is a full code. 



REVIEW OF SYSTEMS:  Positive for lower extremity chronic lymphedema, easily 
falling

asleep, swelling of his left eyelid, shaking or jerking of his arms as well as

feeling a little short of breath.  All remaining review of systems are reviewed 
and

negative. 



PHYSICAL EXAMINATION:

VITAL SIGNS:  Blood pressure 220/85, pulse 77, respirations 18, saturation is 98
% on

room air, and temperature 98.3. 

GENERAL:  The patient is awake, but easily falling asleep or nodding off for a

minute or two and then coming back into same place with the conversation.  He 
is not

in apparent distress. 

HEENT:  His pupils are equals and round.  No scleral icterus.  Auditory canals 
are

occluded with cerumen.  Edema of the left eyelid as well as ptosis. 

NECK:  Supple.  Nontender. 

LYMPHATICS:  No palpable cervical or supraclavicular lymphadenopathy. 

LUNGS:  He had some faint bibasilar rales. 

HEART:  Normal S1 and S2.  Regular rate and rhythm.  No significant murmurs. 

ABDOMEN:  Soft with present bowel sounds. 

EXTREMITIES:  He has chronic lymphedema and weeping from his right lower 
extremity

as well as hyperpigmentation and some mild erythema from the cath distally. 

NEURO:  jerking of his arms and legs with intermittent nodding off. 

PSYCH:  waxing/waning mentation



LABORATORY DATA:  CBC, 5.7, 10.1, 30.4, 141. 



Chemistry, 137, 6.5, 99, 22, 98, 6.63, 151. 



LFT, T-bili is 0.5, AST 25, ALT 14, alkaline phosphatase 67, troponin 0.036.  
BNP

880.  Total protein 7.0, albumin 3.5. 



ECG:  sinus, normal axis, no st change, some flattening of twaves



Chest x-ray, personally reviewed, shows evidence of CHF, pleural and parenchymal

densities at the right mid to inferior right chest which favors pleural fluid 
with

adjacent atelectasis and/or pneumonia. 



IMPRESSION:  

1. Hyperkalemia with associated renal failure, as well as uremia to explain the

patient's neurologic symptoms and somnolence. This is likely multifactorial due 
to being on high dose diuretics, decrease in

urine output as well as possibly poor p.o. intake. 

3. Diastolic heart failure, decompensated with evidence of pulmonary edema.

4. Coronary artery disease with history of stent.

5. Paroxysmal atrial fibrillation.

6. Chronic anemia.

7. Diabetes mellitus, type 2.  By history, controlled.

8. Gastroesophageal reflux disease.

9. Hypertension.

10. Morbid obesity.

11. Paraplegia.

12. Obstructive sleep apnea, on CPAP.

13. Deep venous thrombosis.



PLAN:  

1. Admission to the [facility] for closer monitoring.

2. I talked with Dr. Dye, the patient is uremic, hyperkalemic with significantly

elevated blood pressures with request for dialysis and he agrees.  General 
Surgery

will be contacted for a placement of a catheter.  In the meantime, the patient 
has

received temporizing medications in the emergency room. 

3. Monitoring kidneys for signs of return of renal function.

4. Volume overload, will be addressed with dialysis.

5. Continuing the patient's beta blocker.  Holding his other medications as 
dialysis

will affect his blood pressure. 

6. Holding his full anticoagulation with Eliquis for now given the placement of 
the

catheter.  Anticipate this can be resumed tomorrow. 

7. Holding his long-acting insulin as I am not sure what dose he takes.  Will 
use

sliding scale insulin in the meantime. 

8. Continuing his statin, per his last discharge summary.  Also continue inhaled

steroids in addition to p.r.n. nebs. 

9. Holding his Neurontin due to his renal function.

10. We will continue his Multaq.

11. We will order a CPAP for use here.  Pt on Breo at home by chart review - 
will order scheduled pulmicort and prn nebs.

12.  Do not suspect pneumonia at this time - hold on further antibiotics.

13. DVT prophylaxis.  The patient is fully anticoagulated with Eliquis.  
Anticipate

this can be resumed tomorrow. 

14. GI prophylaxis, not indicated.

15. Code status is full.

16. Surrogate decision maker is patient's wife.

17. The patient is at high risk given his comorbidities and current 
presentation.

18. Reviewed the plan of care with the patient and his wife, no questions or 
further

needs at end of evaluation. 







Job ID:  [id number]



MTDD

## 2018-12-28 NOTE — RAD
FRONTAL VIEW CHEST:

 

COMPARISON: 

8/25/2018.

 

INDICATION: 

Dyspnea.

 

FINDINGS: 

There is pleural-based and parenchymal density at the mid to inferior right chest.  The cardiac silho
uette is enlarged.  Mild prominence of the central pulmonary vasculature is seen.  The chest is other
wise similar in appearance.

 

IMPRESSION: 

1.  Pleural and parenchymal density at the mid to inferior right chest which favors pleural fluid wit
h adjacent atelectasis and/or pneumonia.

 

2.  Evidence of congestive heart failure.

 

3.  Recommend followup to resolution.

 

POS: [facility]

## 2018-12-29 LAB
ANION GAP SERPL CALC-SCNC: 19 MMOL/L (ref 10–20)
BASOPHILS # BLD AUTO: 0 THOU/UL (ref 0–0.2)
BASOPHILS NFR BLD AUTO: 0.4 % (ref 0–1)
BUN SERPL-MCNC: 82 MG/DL (ref 8.4–25.7)
CALCIUM SERPL-MCNC: 9.1 MG/DL (ref 7.8–10.44)
CHLORIDE SERPL-SCNC: 98 MMOL/L (ref 98–107)
CO2 SERPL-SCNC: 24 MMOL/L (ref 23–31)
CREAT CL PREDICTED SERPL C-G-VRATE: 21 ML/MIN (ref 70–130)
EOSINOPHIL # BLD AUTO: 0.2 THOU/UL (ref 0–0.7)
EOSINOPHIL NFR BLD AUTO: 4 % (ref 0–10)
GLUCOSE SERPL-MCNC: 178 MG/DL (ref 83–110)
HBSAG INDEX: 0.18 S/CO (ref 0–0.99)
HBV SURFACE AB SERPL IA-ACNC: 0.63 MIU/ML
HEP B CORE TOTAL INDEX: 0.07 S/CO (ref 0–0.79)
HEP C INDEX: 0.11 S/CO (ref 0–0.79)
HGB BLD-MCNC: 9.7 G/DL (ref 14–18)
LYMPHOCYTES # BLD: 0.9 THOU/UL (ref 1.2–3.4)
LYMPHOCYTES NFR BLD AUTO: 16.6 % (ref 21–51)
MCH RBC QN AUTO: 30.8 PG (ref 27–31)
MCV RBC AUTO: 92.8 FL (ref 78–98)
MONOCYTES # BLD AUTO: 0.7 THOU/UL (ref 0.11–0.59)
MONOCYTES NFR BLD AUTO: 11.9 % (ref 0–10)
NEUTROPHILS # BLD AUTO: 3.7 THOU/UL (ref 1.4–6.5)
NEUTROPHILS NFR BLD AUTO: 67 % (ref 42–75)
PLATELET # BLD AUTO: 133 THOU/UL (ref 130–400)
POTASSIUM SERPL-SCNC: 5.1 MMOL/L (ref 3.5–5.1)
RBC # BLD AUTO: 3.13 MILL/UL (ref 4.7–6.1)
SODIUM SERPL-SCNC: 136 MMOL/L (ref 136–145)
WBC # BLD AUTO: 5.5 THOU/UL (ref 4.8–10.8)

## 2018-12-29 PROCEDURE — 5A1D70Z PERFORMANCE OF URINARY FILTRATION, INTERMITTENT, LESS THAN 6 HOURS PER DAY: ICD-10-PCS | Performed by: INTERNAL MEDICINE

## 2018-12-29 RX ADMIN — INSULIN LISPRO PRN UNIT: 100 INJECTION, SOLUTION INTRAVENOUS; SUBCUTANEOUS at 05:55

## 2018-12-29 RX ADMIN — Medication SCH ML: at 20:09

## 2018-12-29 RX ADMIN — ASPIRIN SCH MG: 81 TABLET ORAL at 08:39

## 2018-12-29 RX ADMIN — Medication SCH ML: at 08:40

## 2018-12-29 RX ADMIN — Medication SCH ML: at 00:07

## 2018-12-29 NOTE — PDOC.PN
- Subjective


Encounter Start Date: 12/29/18


Encounter Start Time: 15:47





Pt seen for followup re: hyperkalemia.  Denies chest pain, shortness of breath, 

fevers or chills.  c/o generalized weakness.





- Objective


Resuscitation Status - Order Detail:





12/28/18 18:25


Resuscitation Status Routine 


   Resuscitation Status: FULL: Full Resuscitation








MAR Reviewed: Yes


Vital Signs & Weight: 


 Vital Signs (12 hours)











  Temp Pulse Pulse Pulse Resp BP BP


 


 12/29/18 15:23  98 F  87    18  


 


 12/29/18 14:43    87  82    174/117 H


 


 12/29/18 14:32       187/94 H 


 


 12/29/18 08:40       151/89 H 


 


 12/29/18 08:24       


 


 12/29/18 07:41   85    16  


 


 12/29/18 06:00   86    24 H  


 


 12/29/18 04:00  98.0 F  81    24 H  


 


 12/29/18 03:54   80     191/84 H 














  BP BP Pulse Ox Pulse Ox Pulse Ox


 


 12/29/18 15:23   169/72 H  97  


 


 12/29/18 14:43  180/89 H    96  96


 


 12/29/18 14:32     


 


 12/29/18 08:40     


 


 12/29/18 08:24    97  


 


 12/29/18 07:41    99  


 


 12/29/18 06:00   157/103 H  93 L  


 


 12/29/18 04:00   179/91 H  94 L  


 


 12/29/18 03:54     








 Weight











Admit Weight                   316 lb


 


Weight                         316 lb 12.8 oz














I&O: 


 











 12/28/18 12/29/18 12/30/18





 06:59 06:59 06:59


 


Intake Total  480 110


 


Output Total  1100 


 


Balance  -620 110











Result Diagrams: 


 12/29/18 04:47





 12/29/18 04:47


Additional Labs: 


 Accuchecks











  12/29/18 12/28/18 12/28/18





  05:16 19:45 18:04


 


POC Glucose  185 H  163 H  160 H











EKG Reviewed by me: Yes (Tele:  [facility])





Phys Exam





- Physical Examination


Constitutional: NAD


HEENT: moist MMs, sclera anicteric, oral pharynx no lesions, 2+ tonsils


Neck: no nodes, no JVD, supple, full ROM


Respiratory: no wheezing, no rales, no rhonchi, clear to auscultation bilateral


Cardiovascular: RRR, no rub


S1, S2


Gastrointestinal: soft, non-tender, no distention, positive bowel sounds


Neurological: moves all 4 limbs


Psychiatric: normal affect, A&O x 3


Deviation from normal: Wounds as documented





Dx/Plan


(1) Hyperkalemia


Code(s): E87.5 - HYPERKALEMIA   Status: Acute   Comment: s/p dialysis   





(2) Acute on chronic diastolic CHF (congestive heart failure)


Code(s): I50.33 - ACUTE ON CHRONIC DIASTOLIC (CONGESTIVE) HEART FAILURE   Status

: Acute   Comment: NYHA Class 3.  ACC/AHA Class C.  Continue IV furosemide.   





(3) DM type 2 (diabetes mellitus, type 2)


Status: Chronic   


Qualifiers: 


   Diabetes mellitus long term insulin use: with long term use   Diabetes 

mellitus complication status: with neurologic complications   Diabetes mellitus 

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 

diabetes mellitus with diabetic polyneuropathy; Z79.4 - Long term (current) use 

of insulin   


Comment: reasonable control   





(4) GERD (gastroesophageal reflux disease)


Code(s): K21.9 - GASTRO-ESOPHAGEAL REFLUX DISEASE WITHOUT ESOPHAGITIS   Status: 

Chronic   


Qualifiers: 


   Esophagitis presence: esophagitis presence not specified   Qualified Code(s)

: K21.9 - Gastro-esophageal reflux disease without esophagitis   


Comment: stable   





(5) HTN (hypertension)


Code(s): I10 - ESSENTIAL (PRIMARY) HYPERTENSION   Status: Chronic   


Qualifiers: 


   Hypertension type: essential hypertension   Qualified Code(s): I10 - 

Essential (primary) hypertension   


Comment: Monitor vital signs, titrate antihypertensives as needed   





- Plan





* .








Review of Systems





- Review of Systems


Constitutional: weakness.  negative: fever, chills, sweats, malaise


Respiratory: negative: Cough, Shortness of Breath, SOB with Excertion, 

Pleuritic Pain, Wheezing


Cardiovascular: negative: chest pain, palpitations, orthopnea, paroxysmal 

nocturnal dyspnea, edema, light headedness


Gastrointestinal: negative: Nausea, Vomiting, Abdominal Pain, Diarrhea, 

Constipation, Melena, Hematochezia


Genitourinary: negative: Dysuria, Frequency, Incontinence, Hematuria, Retention


Skin: negative: Rash, Lesions, Ramses, Bruising





- Medications/Allergies


Allergies/Adverse Reactions: 


 Allergies











Allergy/AdvReac Type Severity Reaction Status Date / Time


 


diazepam [From Valium] Allergy   Verified 08/22/18 04:35


 


Iodinated Contrast- Oral and Allergy   Verified 08/22/18 04:35





IV Dye     





[Iodinated Contrast Media -     





IV Dye]     


 


nitrofurantoin Allergy   Verified 08/22/18 04:35





[From Macrobid]     


 


sodium hypochlorite solution Allergy   Verified 08/22/18 04:35





[sodium hypochlorite]     


 


Sulfa (Sulfonamide Allergy   Verified 08/22/18 04:35





Antibiotics)     


 


sulfamethoxazole Allergy   Verified 08/22/18 04:35





[From Bactrim]     


 


trimethoprim [From Bactrim] Allergy   Verified 08/22/18 04:35


 


sodium hyperchlorite Allergy Unknown  Uncoded 08/22/18 04:35











Medications: 


 Current Medications





Acetaminophen (Tylenol)  650 mg PO Q4H PRN


   PRN Reason: Headache/Fever/Mild Pain (1-3)


Albuterol/Ipratropium (Duoneb)  3 ml NEB [id number] PRN


   PRN Reason: SOB &/or Wheezing


Aspirin (Ecotrin)  81 mg PO DAILY [facility]


   Last Admin: 12/29/18 08:39 Dose:  81 mg


Atorvastatin Calcium (Lipitor)  80 mg PO HS [facility]


   Last Admin: 12/29/18 00:05 Dose:  80 mg


Budesonide (Pulmicort Neb Solution)  0.5 mg INH BID-RT [facility]


   Last Admin: 12/29/18 07:41 Dose:  0.5 mg


Carvedilol (Coreg)  6.25 mg PO TID [facility]


   Last Admin: 12/29/18 14:32 Dose:  6.25 mg


Dextrose/Water (Dextrose 50%)  25 gm SLOW IVP PRN PRN


   PRN Reason: Hypoglycemia


Dronedarone (Multaq)  400 mg PO BID-WM [facility]


   Last Admin: 12/29/18 08:39 Dose:  400 mg


Furosemide (Lasix)  40 mg SLOW IVP NOW [facility]


   Stop: 12/29/18 16:45


   Last Admin: 12/29/18 15:37 Dose:  40 mg


Furosemide (Lasix)  40 mg SLOW IVP 0600 [facility]


Glucagon (Glucagon)  1 mg IM PRN PRN


   PRN Reason: Hypoglycemia


Hydralazine HCl (Apresoline)  10 mg SLOW IVP Q4H PRN


   PRN Reason: SBP Greater Than 180


   Last Admin: 12/29/18 03:54 Dose:  10 mg


Dextrose/Water (D5w)  1,000 mls @ 0 mls/hr IV .Q0M PRN


   PRN Reason: Hypoglycemia


Insulin Human Lispro (Humalog)  0 units SC .MODERATE SLIDING SC PRN


   PRN Reason: Moderate Correctional Scale


   Last Admin: 12/29/18 05:55 Dose:  2 unit


Sodium Chloride (Flush - Normal Saline)  10 ml IVF Q12HR [facility]


   Last Admin: 12/29/18 08:40 Dose:  10 ml


Sodium Chloride (Flush - Normal Saline)  10 ml IVF PRN PRN


   PRN Reason: Saline Flush

## 2018-12-29 NOTE — ULT
RENAL ULTRASOUND: 

12/29/18

 

HISTORY: 

Acute kidney insufficiency. 

 

COMPARISON:  

None. 

 

TECHNIQUE:  

Sagittal and transverse imaging of the kidneys is performed. 

 

FINDINGS:  

Limited evaluation due to body habitus. 

 

RIGHT KIDNEY:

There is renal cortical thinning. Hypoechoic focus in the lower pole of the right kidney cannot be fu
rther assessed. Lesion measures 1.5 x 1.5 x 1.4 cm. Overall, right kidney measures 6.1 x 5.8 x 10.1 c
m. There is left renal cortical thinning. Suboptimal evaluation of the cortex. No definite hydronephr
osis. Left kidney measures 12.0 x 6.4 x 6.7 cm. Brown catheter  decompresses the urinary bladder. 

 

IMPRESSION:  

1.      Hypoechoic focus in the lower pole of the right kidney which cannot be further characterized.
 

2.      Grossly no evidence of significant hydronephrosis. 

3.      Bilateral renal cortical thinning. 

 

POS: [facility]

## 2018-12-29 NOTE — CON
DATE OF CONSULTATION:  12/29/2018



SERVICE:  Pulmonary Medicine.



REASON FOR CONSULTATION:  Respiratory failure.



HISTORY OF PRESENT ILLNESS:  The patient is a very pleasant 83-year-old white 
male

with past medical history significant for severe diabetes, which is poorly

controlled.  He was in his usual state of health when he started having 
increasing

chest discomfort and shortness of breath.  He has been having problems with 
urine

difficulties.  For the last couple of weeks, he has been having increasing

difficulty voiding.  He got put on Lasix and improved things for short period of

time, but then it got worse again.  The Lasix was escalated.  He was actually 
put on

metolazone as well, but he continued to have difficulty in voiding.  Ultimately
, he

came to the hospital with these complaints.  Brown catheter was placed and he 
has

significant amount of urine output.  He currently denies any fevers, chills, 
nausea,

vomiting, or diarrhea.  Otherwise, he was in his usual state of health.  
Overnight,

he had dialysis x2 because of hyperkalemia and volume overload that has 
resolved.

He tolerated that without difficulties.  His blood pressures remained quite

elevated.  Otherwise, there has been no interval change to his condition. 



PAST MEDICAL HISTORY:  

1. Type 2 diabetes mellitus.

2. Chronic diastolic heart failure.

3. Obstructive sleep apnea.

4. Paraplegia, dependent on wheelchair.

5. Deconditioning.

6. Coronary artery disease.

7. History of nonsustained ventricular tachycardia.

8. Ankylosing spondylitis.

9. Atrial fibrillation.

10. Hypertension.

11. Dyslipidemia.

12. Obesity.

13. History of DVT.



PAST SURGICAL HISTORY:  

1. Loop recorder.

2. EGD.

3. Colonoscopy.

4. Hemorrhoidectomy.

5. Cardiac catheterization.

6. Finger surgery.

7. Dialysis catheter placement.



FAMILY HISTORY:  Noncontributory.



SOCIAL HISTORY:  The patient lives in a wheelchair or in a bed.  He has a wife.
  He

does not use any alcohol, tobacco, or illicit drug use.  The patient has 
multiple

degrees in chemistry, maths, and other sciences. 



ALLERGIES:  DIAZEPAM, CONTRAST, NITROFURANTOIN, SODIUM HYPOCHLORITE, SULFA, ETC.



REVIEW OF SYSTEMS:  General, head, ears, eyes, nose, throat, cardiovascular,

respiratory, GI, , musculoskeletal, neurologic, and skin is negative, except 
for

as mentioned in the HPI. 



PHYSICAL EXAMINATION:

VITAL SIGNS:  Afebrile.  Pulse 85, blood pressure 158/103, respirations 16, and

saturation 99% on room air. 

GENERAL:  The patient is awake and alert, in no apparent distress. 

LUNGS:  Decent air entry.  Dependent crackles are minimal. 

HEART:  Normal rate and regular. 

ABDOMEN:  Soft, nontender, and nondistended.  Bowel sounds are positive. 

MUSCULOSKELETAL:  No cyanosis or clubbing.  There is 2 to 3+ pitting in the

bilateral lower extremities, which is fairly generalized. 

NEUROLOGIC:  Grossly nonfocal. 

GENITOURINARY:  Brown catheter in place, putting out excellent urine with 
sediment.



LABORATORY DATA:  WBC 5.5, hemoglobin 9.7, and platelets 133,000.  Creatinine 
6.68,

BUN 98.  Bicarb 22, anion gap 23.  Basic metabolic profile is otherwise

unremarkable.  Liver function studies are unremarkable.  BNP is elevated, 
troponin

0.063.  Hepatitis B serologies are unremarkable. 



IMAGING STUDIES:  Chest x-ray demonstrates a right-sided effusion.  He also has

interstitial fullness consistent with volume overload. 



ASSESSMENT:  

1. Acute kidney injury.

2. Acute on chronic diastolic heart failure.

3. Urinary issues, suspected.



DISCUSSION AND PLAN:  I will give the patient a dose of Lasix initiated now and

continued on a daily basis.  Dialysis will be indicated through time.  My 
suspicion

is that he was having an outlet obstruction causing these severe problems.  He

indicates that he has to use the restroom frequently, but only gets out 50 to 
100 mL

at a time and almost continuously feels that he needs to urinate.  He can 
facilitate

urine output by pushing on his stomach.  As such, we will get an ultrasound of

bilateral kidneys.  He may need urodynamic studies in the outpatient setting, 
but I

would strongly consider sending him home with a Brown catheter in place.  This 
is an

acute kidney injury.  Of note, 2 months ago, he had normal kidney function.  He 
can

be transitioned to the floor. 



70 minutes have been devoted to this patient in various activities.  I 
personally reviewed all imaging studies and laboratory data noted within this 
document.  For fifty percent of this time, I was interacting with the patient 
at the bedside or coordinating care with the care team.  For the remainder of 
the time I was immediately available to the patient in the hospital unit.  



Job ID:  [id number]



[facility]

## 2018-12-29 NOTE — CON
DATE OF CONSULTATION:  



SERVICE:  Renal Medicine.



HISTORY OF PRESENT ILLNESS:  Mr. Snider is an 83-year-old white male, who was

admitted for uremic signs and symptoms.  A few days ago, Mr. Snider called the

[facility] complaining of decreased urine output.  At that time, he was taking a

significant amount of diuretics - Lasix 80 mg b.i.d. and metolazone every other day.

 Urinalysis was done the following day, which did not show any overt UTI.  However,

creatinine was noted to have worsened.  Due to the worsening signs and symptoms, the

patient went to the ER today.  There, he was evaluated to be clinically uremic and

his potassium was noted at 6.5 with a creatinine of 6.63.  Please note, I saw this

patient back in 2018 and his creatinine was about 1.68.  I feel that he may

have a component of prerenal azotemia versus overt ATN.  His BNP on 2018 was 880.9.  Chest x-ray showed evidence of CHF.  Due to the clinical

presentation of CHF and hyperkalemia, we have decided to proceed with dialysis.  A

right femoral dialysis catheter is being placed by the ER physician. 



REVIEW OF SYSTEMS:  Positive for tremors.  Positive for nausea.  Positive for

vomiting.  Positive for chronic shortness of breath.  No chest pain.  No syncopal

episode.  No productive cough.  No dysuria.  No urinary frequency.  No melena.  No

hematemesis. 



HOME MEDICATIONS:  Include,

1. Metformin 1000 mg p.o. b.i.d.

2. Glipizide 10 mg daily.

3. Zinc 50 mg daily.

4. Vitamin B complex 1 tab daily.

5. Vitamin A 1 tab daily.

6. Coenzyme Q 30 mg daily.

7. Terazosin 5 mg daily.

8. Protonix 40 mg at bedtime.

9. Isosorbide dinitrate/hydralazine - BiDil - 1 tab t.i.d.

10. Lantus insulin 30 units subcu q.a.m.

11. Furosemide 80 mg p.o. b.i.d.

12. Metolazone 1 tab every other day.

13. Apixaban 5 mg p.o. b.i.d.

14. Aspirin 81 mg daily.



PAST MEDICAL HISTORY:  Includes the followin. Chronic renal failure secondary to a presumed diabetic nephropathy.

2. History of CHF - diastolic heart failure.

3. COPD - on CPAP.

4. 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

5. Type 2 diabetes mellitus.

6. Morbid obesity.

7. Dyslipidemia.

8. Hypertension.

9. Status post paroxysmal AFib.

10. Status post GI bleed.

11. Status post spinal cord injury.

12. History of ankylosing spondylitis.

13. Status post V-tach.



PAST SURGICAL HISTORY:  

1. Status post finger surgery.

2. Status post cardiac cath.

3. Status post upper and lower GI endoscopy.

4. Status post hemorrhoidectomy.

5. Status post loop recorder placement.



ALLERGIES:  

1. SULFA.

2. VALIUM.



TRAUMA:  None.



IMMUNIZATION:  Up to date.



HOSPITALIZATIONS:  Please see past medical history.



FAMILY HISTORY:  Negative for ESRD.



SOCIAL HISTORY:  The patient lives in [place].  He lives at home with his wife.  No

alcohol.  No drug abuse.  Sedentary lifestyle.  Status post blood transfusion. 



PHYSICAL EXAMINATION:

VITAL SIGNS:  Blood pressure 200/70, heart rate 70. 

GENERAL:  The patient is arousable, but confused, not in overt distress.  Morbidly

obese. 

SKIN:  Adequate turgor. 

HEENT:  He has slightly pale conjunctivae.  Anicteric sclerae. 

NECK:  No neck mass.  No carotid bruits.  No JVD. 

CHEST:  No deformities. 

LUNGS:  Decreased breath sounds.  No wheezing.  No crackles. 

HEART:  Normal sinus rhythm.  Grade 2/6 systolic murmur.  No gallops or rubs. 

ABDOMEN:  Globular, soft, and nontender.  No masses. 

EXTREMITIES:  Positive for edema. 

NEUROLOGICAL:  The patient has tremors and asterixis.  Decreased motor strength in

lower extremities. 



LABORATORY DATA:  Laboratories of 2018; white count 5.7, hemoglobin

10.1.  Sodium is 137, potassium 6.5, chloride 99, carbon dioxide 22, BUN 98,

creatinine 6.63, glucose 151, calcium 9.4.  BNP is 880.  Albumin 3.5.  Troponin I

0.036.  2018, creatinine was noted at 6.3 with a potassium of 5.7. 



2018; BUN 43, creatinine 1.68. 



Chest x-ray of 2018, shows pleuroparenchymal density, evidence of CHF.



ASSESSMENT AND PLAN:  

1. Acute kidney injury on top of chronic renal failure, superimposed prerenal

azotemia.  I could not rule out the possibility of acute tubular necrosis.  He did

have a urinalysis done yesterday, which did not show overt acute tubular necrosis.

Due to the volume overload, hyperkalemia, progressive azotemia, we will proceed with

a 2-hour hemodialysis today.  Fluid removal only as tolerated. 

2. Congestive heart failure.  This is chronic in nature.  His chest x-ray shows

increased lung markings. 

3. Hyperkalemia.  Emergent hemodialysis today. 



Thank you for the consult.  We will continue to follow.







Job ID:  [id number]

## 2018-12-29 NOTE — PRG
DATE OF SERVICE:  12/29/2018



SUBJECTIVE:  Mr. Snider is an 83-year-old white male, who was admitted for mental

status change and worsening renal dysfunction.  Due to the hyperkalemia,

hemodialysis was initiated last night.  He tolerated a 2-hour hemodialysis.  Fluid

removal about a liter was done.  I am currently at the bedside, again supervising

his second dialysis today.  We will attempt about another liter of fluid removal.  I

feel that he has a superimposed acute kidney injury that was

hemodynamically-mediated renal dysfunction secondary to the diuretic regimen.  I

will start him on albumin infusion 25 g IV q.6 for the next three days to minimize

fluid removal.  His hyperkalemia is also much improved.  No other complaints today.

He denies any chest pain or shortness of breath. 



OBJECTIVE:  VITAL SIGNS:  Blood pressure 151/89, heart rate 85, respiratory 16,

pulse ox 99% on room air. 

GENERAL:  Awake, supine, comfortable, not in distress. 

SKIN:  Adequate turgor. 

HEENT:  He has a slightly pale conjunctivae.  Anicteric sclerae. 

NECK:  No neck mass.  No carotid bruits.  No JVD. 

CHEST:  No deformities. 

LUNGS:  Clear breath sounds.  No wheezing.  No crackles. 

HEART:  Normal sinus rhythm.  No murmur.  No gallops or rubs. 

ABDOMEN:  Globular, soft, nontender.  No masses. 

EXTREMITIES:  Positive for edema.



MEDICATIONS:  Medications of December 29, 2018, was reviewed.



LABORATORY DATA:  Laboratories of December 29, 2018, white count 5.5, hemoglobin

9.7.  Sodium 136, potassium 5.1, chloride 98, carbon dioxide 24, BUN 82, creatinine

5.62, calcium is 9.1.  Hepatitis B and C, negative. 



ASSESSMENT AND PLAN:  

1. Acute kidney injury - superimposed hemodynamically-mediated renal dysfunction.

Minimal fluid removal with dialysis.  Start albumin infusion 25 g IV q.6.

Discontinue all diuretic regimen. 

2. Hyperkalemia, much improved with dialysis.

3. Congestive heart failure - stable.  Holding the current diuretic regimen.

4. Overall, I agree with current management.  We will recheck basic metabolic panel

and CBC in the morning. 







Job ID:  [id number]

## 2018-12-30 LAB
ANION GAP SERPL CALC-SCNC: 17 MMOL/L (ref 10–20)
BUN SERPL-MCNC: 52 MG/DL (ref 8.4–25.7)
CALCIUM SERPL-MCNC: 8.9 MG/DL (ref 7.8–10.44)
CHLORIDE SERPL-SCNC: 99 MMOL/L (ref 98–107)
CO2 SERPL-SCNC: 25 MMOL/L (ref 23–31)
CREAT CL PREDICTED SERPL C-G-VRATE: 28 ML/MIN (ref 70–130)
GLUCOSE SERPL-MCNC: 153 MG/DL (ref 83–110)
HGB BLD-MCNC: 9.1 G/DL (ref 14–18)
MCH RBC QN AUTO: 30.3 PG (ref 27–31)
MCV RBC AUTO: 93.6 FL (ref 78–98)
PLATELET # BLD AUTO: 156 THOU/UL (ref 130–400)
POTASSIUM SERPL-SCNC: 3.8 MMOL/L (ref 3.5–5.1)
RBC # BLD AUTO: 2.99 MILL/UL (ref 4.7–6.1)
SODIUM SERPL-SCNC: 137 MMOL/L (ref 136–145)
WBC # BLD AUTO: 5.9 THOU/UL (ref 4.8–10.8)

## 2018-12-30 RX ADMIN — ALBUMIN HUMAN SCH GM: 250 SOLUTION INTRAVENOUS at 13:16

## 2018-12-30 RX ADMIN — Medication SCH ML: at 09:18

## 2018-12-30 RX ADMIN — Medication SCH MG: at 20:59

## 2018-12-30 RX ADMIN — ALBUMIN HUMAN SCH GM: 250 SOLUTION INTRAVENOUS at 17:32

## 2018-12-30 RX ADMIN — INSULIN LISPRO PRN UNIT: 100 INJECTION, SOLUTION INTRAVENOUS; SUBCUTANEOUS at 13:17

## 2018-12-30 RX ADMIN — Medication SCH ML: at 21:01

## 2018-12-30 RX ADMIN — ASPIRIN SCH MG: 81 TABLET ORAL at 09:12

## 2018-12-30 RX ADMIN — INSULIN LISPRO PRN UNIT: 100 INJECTION, SOLUTION INTRAVENOUS; SUBCUTANEOUS at 17:27

## 2018-12-30 RX ADMIN — MOMETASONE FUROATE AND FORMOTEROL FUMARATE DIHYDRATE SCH PUFF: 100; 5 AEROSOL RESPIRATORY (INHALATION) at 19:19

## 2018-12-30 RX ADMIN — Medication SCH TAB: at 20:59

## 2018-12-30 NOTE — CON
DATE OF CONSULTATION:  



REASON FOR CONSULTATION:  Shortness of breath and fluid overload.



PRIMARY CARDIOLOGIST:  Dr. Aman Landers.



HISTORY OF PRESENT ILLNESS:  Mr. Snider is a 83-year-old gentleman, who has been

seen by Dr. Aman Landers in the past.  He recently was admitted for worsening renal

function.  He did have increased shortness of breath.  He underwent dialysis x3.  He

states he feels much better.  He currently denies chest pain or pressure.  His

shortness of breath again has improved after dialysis x3. 



PAST MEDICAL HISTORY:  Diabetes mellitus, diastolic dysfunction, CAD status post

stent placement, nonsustained VT, ankylosing spondylitis, atrial fibrillation,

hypertension, obesity, DVT. 



SURGICAL HISTORY:  EGD, hemorrhoidectomy, stent placement in [place].



SOCIAL HISTORY:  No current tobacco or alcohol use.



ALLERGIES:  IV CONTRAST, CARDIZEM, NITROFURANTOIN, SULFA.



REVIEW OF SYSTEMS:  A 10-point review of systems is reviewed and as above, otherwise

negative. 



PHYSICAL EXAMINATION:

VITAL SIGNS:  Blood pressure 147/78, pulse 89, temperature afebrile. 

GENERAL:  Patient is a pleasant male, who is in no acute distress.  The patient

appears their stated age.  He is morbidly obese. 

NEUROLOGIC:  The patient is alert and oriented x3 with no focal neurologic deficits. 

HEENT:  Sclerae without icterus.  Mouth has moist mucous membranes with normal

pallor. 

NECK:  No JVD.  Carotid upstroke brisk.  No bruits bilaterally. 

LUNGS:  Clear to auscultation with unlabored respirations. 

BACK:  No scoliosis or kyphosis. 

CARDIAC:  Regular rate and rhythm with normal S1 and S2.  No S3 or S4 noted.  No

significant rubs, murmurs, thrills, or gallops noted throughout the precordium.  PMI

is not displaced.  There is no parasternal heave. 

ABDOMEN:  Soft, nontender, nondistended.  No peritoneal signs present.  No

hepatosplenomegaly.  No abnormal striae. 

EXTREMITIES:  Significant chronic stasis dermatitis noted bilaterally with 1 to 2+

pitting edema. 

SKIN:  No gross abnormalities.



PERTINENT LABORATORY DATA:  Hemoglobin 9.1.  Creatinine 4.1, which has improved from

6.6.  Last creatinine was 1.05 on 10/22/2018. 



IMPRESSION:  

1. Shortness of breath.

2. Acute on chronic kidney disease.

3. Diastolic dysfunction.

4. Coronary artery disease, status post stent placement.



RECOMMENDATIONS:  At this point, Mr. Snider's symptoms are all likely related to

his renal function.  He states his shortness of breath has much improved after

recent dialysis.  His potassium level has also improved.  At this point, we would

recommend continued observation.  Last echo performed in the office was on

10/07/2017.  We would recommend repeating his echo to assess LV function as well as

diastolic dysfunction.  Otherwise, I have no further recommendations. 







Job ID:  [id number]

## 2018-12-30 NOTE — PRG
DATE OF SERVICE:  12/30/2018



SERVICE:  Pulmonary Medicine.



INTERVAL HISTORY:  The patient is doing fine from respiratory standpoint.  Breathing

comfortably.  Denies any current chest pain, fevers, chills, nausea, or vomiting.

Otherwise, there has been no interval change to his condition.  His home CPAP unit

was not functioning last night. 



LABORATORY DATA:  WBC 5.9, hemoglobin 9.1, platelets 156,000.  Creatinine 4.19 and

downtrending, BUN 52 and improving.  Basic metabolic profile is otherwise

unremarkable. 



ASSESSMENT:  

1. Acute kidney injury.

2. Acute on chronic diastolic heart failure.

3. Urinary issues, suspected retained.

4. Obstructive sleep apnea.



DISCUSSION AND PLAN:  We will continue to diurese the patient through time.

Symbicort will be initiated.  We will give him DuoNeb on an as needed basis.  At

this point, he has no further inpatient requirements for Pulmonary Critical Care

opinion, and I will sign off.  Please call with additional questions or concerns

moving forward. 







Job ID:  [id number]

## 2018-12-30 NOTE — PDOC.PN
- Subjective


Encounter Start Date: 12/30/18


Encounter Start Time: 09:20





Pt seen for followup re: diastolic CHF exacerbation.  Feels better.





- Objective


Resuscitation Status - Order Detail:





12/28/18 18:25


Resuscitation Status Routine 


   Resuscitation Status: FULL: Full Resuscitation








MAR Reviewed: Yes


Vital Signs & Weight: 


 Vital Signs (12 hours)











  Temp Pulse Resp BP BP Pulse Ox


 


 12/30/18 13:16     147/78 H  


 


 12/30/18 12:00  97.6 F  89  20   147/78 H  92 L


 


 12/30/18 09:19   89   185/84 H  


 


 12/30/18 09:12     209/92 H  


 


 12/30/18 08:23  97.6 F  86  20   208/92 H  96


 


 12/30/18 08:00       96


 


 12/30/18 04:00  97.7 F  81  18   121/51 L  97








 Weight











Admit Weight                   316 lb


 


Weight                         316 lb 12.8 oz














I&O: 


 











 12/29/18 12/30/18 12/31/18





 06:59 06:59 06:59


 


Intake Total 480 110 


 


Output Total 1100  


 


Balance -620 110 











Result Diagrams: 


 12/30/18 06:05





 12/30/18 06:05


Additional Labs: 


 Accuchecks











  12/30/18 12/30/18 12/29/18





  11:57 04:28 19:39


 


POC Glucose  238 H  168 H  211 H














  12/29/18





  17:16


 


POC Glucose  195 H








labs reviewed by me





Phys Exam





- Physical Examination


Obese


HEENT: moist MMs


Neck: supple


Respiratory: clear to auscultation bilateral


Cardiovascular: RRR


Gastrointestinal: soft


Neurological: moves all 4 limbs


Psychiatric: normal affect


Deviation from normal: wounds as documented





Dx/Plan


(1) Acute on chronic diastolic CHF (congestive heart failure)


Code(s): I50.33 - ACUTE ON CHRONIC DIASTOLIC (CONGESTIVE) HEART FAILURE   Status

: Acute   Comment: NYHA Class 3.  ACC/AHA Class C.  Continue IV furosemide.  Pt 

also had dialysis.   





(2) DM type 2 (diabetes mellitus, type 2)


Status: Chronic   


Qualifiers: 


   Diabetes mellitus long term insulin use: with long term use   Diabetes 

mellitus complication status: with neurologic complications   Diabetes mellitus 

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 

diabetes mellitus with diabetic polyneuropathy; Z79.4 - Long term (current) use 

of insulin   


Comment: reasonably controlled   





(3) GERD (gastroesophageal reflux disease)


Code(s): K21.9 - GASTRO-ESOPHAGEAL REFLUX DISEASE WITHOUT ESOPHAGITIS   Status: 

Chronic   


Qualifiers: 


   Esophagitis presence: esophagitis presence not specified   Qualified Code(s)

: K21.9 - Gastro-esophageal reflux disease without esophagitis   


Comment: stable   





(4) HTN (hypertension)


Code(s): I10 - ESSENTIAL (PRIMARY) HYPERTENSION   Status: Chronic   


Qualifiers: 


   Hypertension type: essential hypertension   Qualified Code(s): I10 - 

Essential (primary) hypertension   


Comment: resuming home medications   





(5) Hyperkalemia


Code(s): E87.5 - HYPERKALEMIA   Status: Resolved   





- Plan





* .








Review of Systems





- Review of Systems


Respiratory: negative: Cough, Shortness of Breath, SOB with Excertion, 

Pleuritic Pain, Wheezing


Cardiovascular: negative: chest pain, palpitations, orthopnea, paroxysmal 

nocturnal dyspnea, edema, light headedness





- Medications/Allergies


Allergies/Adverse Reactions: 


 Allergies











Allergy/AdvReac Type Severity Reaction Status Date / Time


 


diazepam [From Valium] Allergy   Verified 08/22/18 04:35


 


Iodinated Contrast- Oral and Allergy   Verified 08/22/18 04:35





IV Dye     





[Iodinated Contrast Media -     





IV Dye]     


 


nitrofurantoin Allergy   Verified 08/22/18 04:35





[From Macrobid]     


 


sodium hypochlorite solution Allergy   Verified 08/22/18 04:35





[sodium hypochlorite]     


 


Sulfa (Sulfonamide Allergy   Verified 08/22/18 04:35





Antibiotics)     


 


sulfamethoxazole Allergy   Verified 08/22/18 04:35





[From Bactrim]     


 


trimethoprim [From Bactrim] Allergy   Verified 08/22/18 04:35


 


sodium hyperchlorite Allergy Unknown  Uncoded 08/22/18 04:35











Medications: 


 Current Medications





Acetaminophen (Tylenol)  650 mg PO Q4H PRN


   PRN Reason: Headache/Fever/Mild Pain (1-3)


Albumin Human (Albumin 25%)  25 gm IVPB Q6HR [facility]


   Stop: 01/02/19 06:01


   Last Admin: 12/30/18 13:16 Dose:  25 gm


Albuterol/Ipratropium (Duoneb)  3 ml NEB [id number] PRN


   PRN Reason: SOB &/or Wheezing


Ascorbic Acid (Vitamin C)  1,000 mg PO BID [facility]


Aspirin (Ecotrin)  81 mg PO DAILY [facility]


   Last Admin: 12/30/18 09:12 Dose:  81 mg


Atorvastatin Calcium (Lipitor)  80 mg PO HS [facility]


Budesonide (Pulmicort Neb Solution)  0.5 mg INH BID-RT [facility]


   Last Admin: 12/30/18 06:30 Dose:  Not Given


Calcium/Vitamin D (Caltrate 600 + Vit D)  1 tab PO BID [facility]


Carvedilol (Coreg)  6.25 mg PO QID [facility]


   Last Admin: 12/30/18 13:16 Dose:  6.25 mg


Cyanocobalamin (Vitamin B-12)  1,000 mcg IM Q28D [facility]


Dextrose/Water (Dextrose 50%)  25 gm SLOW IVP PRN PRN


   PRN Reason: Hypoglycemia


Dronedarone (Multaq)  400 mg PO BID [facility]


Famotidine (Pepcid)  20 mg PO DAILY [facility]


Ferrous Sulfate (Feosol)  325 mg PO BID-WM [facility]


Furosemide (Lasix)  40 mg SLOW IVP 0600 [facility]


   Last Admin: 12/30/18 05:48 Dose:  40 mg


Gabapentin (Neurontin)  600 mg PO BID [facility]


Glipizide (Glucotrol)  10 mg PO DAILY-AC [facility]


Glucagon (Glucagon)  1 mg IM PRN PRN


   PRN Reason: Hypoglycemia


Hydralazine HCl (Apresoline)  10 mg SLOW IVP Q4H PRN


   PRN Reason: SBP Greater Than 180


   Last Admin: 12/30/18 09:19 Dose:  10 mg


Hydralazine HCl (Apresoline)  37.5 mg PO TID [facility]


Dextrose/Water (D5w)  1,000 mls @ 0 mls/hr IV .Q0M PRN


   PRN Reason: Hypoglycemia


Insulin Human Lispro (Humalog)  0 units SC .MODERATE SLIDING SC PRN


   PRN Reason: Moderate Correctional Scale


   Last Admin: 12/30/18 13:17 Dose:  4 unit


Isosorbide Dinitrate (Isordil)  20 mg PO TID [facility]


Mometasone Furoate/Formoterol Fumar (Dulera 100 Mcg/5 Mcg Inhaler)  2 puff INH 

BID-RT [facility]


Multivitamins/Zinc (Stress 600 With Zinc)  1 tab PO DAILY [facility]


Pantoprazole Sodium (Protonix)  40 mg PO HS [facility]


(Krill/Om-3/Dha/Epa/Phospho/Ast [Krill Oil 500 Mg Softgel] 1 Ca  0 each PO 

DAILY [facility]


(Ubidecarenone [Coq- (10] 30 Mg)  Med)  0 each PO DAILY [place]


(Vitamin A [Vitamin (A] 1 Tab)  Med)  0 each PO DAILY [place]


Sodium Chloride (Flush - Normal Saline)  10 ml IVF Q12HR [place]


   Last Admin: 12/30/18 09:18 Dose:  10 ml


Sodium Chloride (Flush - Normal Saline)  10 ml IVF PRN PRN


   PRN Reason: Saline Flush


Terazosin HCl (Hytrin)  5 mg PO DAILY [place]


Zinc Sulfate (Zinc Sulfate)  220 mg PO DAILY [place]

## 2018-12-30 NOTE — PRG
DATE OF SERVICE:  12/30/2018



SUBJECTIVE:  Mr. Snider is an 83-year-old white male with chronic renal failure

and was admitted due to uremic signs and symptoms.  He was emergently dialyzed.  He

was also noted to be hyperkalemic.  Potassium was much improved now with two

dialysis sessions.  No new complaints today.  He wants to go back to use regular

anti-GERD medications.  Per his request, I have decided to add Zantac 150 mg tablet

once a day.  No complaints of chest pain or shortness of breath. 



OBJECTIVE:  VITAL SIGNS:  Blood pressure 185/84, heart rate 89, respiratory rate 20,

temperature 97.6, and pulse ox 96%. 

GENERAL:  Awake, supine, comfortable, obese. 

SKIN:  Adequate turgor. 

HEENT:  He has a slightly pale conjunctivae.  Anicteric sclerae. 

NECK:  No neck mass.  No carotid bruits.  No JVD. 

CHEST:  No deformities. 

LUNGS:  Decreased breath sounds. 

HEART:  Normal sinus rhythm.  No murmurs, gallops, or rubs. 

ABDOMEN:  Globular, soft, nontender.  No masses. 

EXTREMITIES:  No edema.  No deformities.



MEDICATIONS:  Medications of December 30, 2018, was reviewed.



LABORATORY DATA:  Laboratories of December 30, 2018, reviewed.



ASSESSMENT/PLAN:  

1. Acute kidney injury on top of his chronic renal failure - superimposed prerenal

azotemia.  Hemodialysis was initiated due to the hyperkalemia.  Continue current

management.  We will hold off dialysis today. 

2. Hyperkalemia, resolved with dialysis.

3. Volume depletion - we will start the patient on albumin 25 g IV q.6.  Currently,

judicious use of diuretics. 

4. Gastroesophageal reflux disease - we will start Zantac per his request.





Job ID:  [id number]

## 2018-12-31 LAB
ANION GAP SERPL CALC-SCNC: 17 MMOL/L (ref 10–20)
BUN SERPL-MCNC: 60 MG/DL (ref 8.4–25.7)
CALCIUM SERPL-MCNC: 9 MG/DL (ref 7.8–10.44)
CHLORIDE SERPL-SCNC: 99 MMOL/L (ref 98–107)
CO2 SERPL-SCNC: 24 MMOL/L (ref 23–31)
CREAT CL PREDICTED SERPL C-G-VRATE: 25 ML/MIN (ref 70–130)
GLUCOSE SERPL-MCNC: 177 MG/DL (ref 83–110)
POTASSIUM SERPL-SCNC: 3.4 MMOL/L (ref 3.5–5.1)
SODIUM SERPL-SCNC: 137 MMOL/L (ref 136–145)

## 2018-12-31 RX ADMIN — Medication SCH MG: at 21:23

## 2018-12-31 RX ADMIN — Medication SCH TAB: at 08:19

## 2018-12-31 RX ADMIN — MOMETASONE FUROATE AND FORMOTEROL FUMARATE DIHYDRATE SCH PUFF: 100; 5 AEROSOL RESPIRATORY (INHALATION) at 18:55

## 2018-12-31 RX ADMIN — ASPIRIN SCH MG: 81 TABLET ORAL at 08:18

## 2018-12-31 RX ADMIN — Medication SCH: at 08:28

## 2018-12-31 RX ADMIN — INSULIN LISPRO PRN UNIT: 100 INJECTION, SOLUTION INTRAVENOUS; SUBCUTANEOUS at 05:42

## 2018-12-31 RX ADMIN — Medication SCH TAB: at 21:23

## 2018-12-31 RX ADMIN — Medication SCH ML: at 08:29

## 2018-12-31 RX ADMIN — Medication SCH MG: at 08:16

## 2018-12-31 RX ADMIN — MOMETASONE FUROATE AND FORMOTEROL FUMARATE DIHYDRATE SCH PUFF: 100; 5 AEROSOL RESPIRATORY (INHALATION) at 07:03

## 2018-12-31 RX ADMIN — Medication SCH ML: at 21:25

## 2018-12-31 RX ADMIN — INSULIN LISPRO PRN UNIT: 100 INJECTION, SOLUTION INTRAVENOUS; SUBCUTANEOUS at 21:28

## 2018-12-31 NOTE — PDOC.PN
- Subjective


Encounter Start Date: 12/31/18


Encounter Start Time: 10:00





Pt seen for followup re: acute on chronic diastolic CHF.  Feels better.





- Objective


Resuscitation Status - Order Detail:





12/28/18 18:25


Resuscitation Status Routine 


   Resuscitation Status: FULL: Full Resuscitation








MAR Reviewed: Yes


Vital Signs & Weight: 


 Vital Signs (12 hours)











  Temp Pulse Resp BP BP Pulse Ox


 


 12/31/18 17:18   69    


 


 12/31/18 16:00  97.3 F L  69  18   168/75 H  94 L


 


 12/31/18 11:00  97.6 F  72  18   170/67 H  100


 


 12/31/18 10:31       99


 


 12/31/18 08:00       95


 


 12/31/18 07:29  97.6 F  64  16   209/108 H  94 L


 


 12/31/18 07:01   84  16    95


 


 12/31/18 06:03      196/91 H 


 


 12/31/18 06:02   84   196/91 H  








 Weight











Admit Weight                   316 lb


 


Weight                         316 lb 12.8 oz














I&O: 


 











 12/30/18 12/31/18 01/01/19





 06:59 06:59 06:59


 


Intake Total 110 1380 


 


Output Total  1300 


 


Balance 110 80 











Result Diagrams: 


 12/30/18 06:05





 12/31/18 09:03


Additional Labs: 


 Accuchecks











  12/31/18 12/31/18 12/31/18





  16:29 11:26 05:42


 


POC Glucose  218 H  214 H  204 H














  12/30/18





  19:39


 


POC Glucose  232 H








labs reviewed by me





Phys Exam





- Physical Examination


Morbid obesity


HEENT: moist MMs


Neck: supple


Respiratory: clear to auscultation bilateral


Cardiovascular: RRR


Gastrointestinal: soft


Neurological: moves all 4 limbs


Psychiatric: normal affect





Dx/Plan


(1) Acute on chronic diastolic CHF (congestive heart failure)


Code(s): I50.33 - ACUTE ON CHRONIC DIASTOLIC (CONGESTIVE) HEART FAILURE   Status

: Acute   Comment: Improving with IV furosemide and dialysis.   





(2) DM type 2 (diabetes mellitus, type 2)


Status: Chronic   


Qualifiers: 


   Diabetes mellitus long term insulin use: with long term use   Diabetes 

mellitus complication status: with neurologic complications   Diabetes mellitus 

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 

diabetes mellitus with diabetic polyneuropathy; Z79.4 - Long term (current) use 

of insulin   


Comment: reasonably controlled   





(3) GERD (gastroesophageal reflux disease)


Code(s): K21.9 - GASTRO-ESOPHAGEAL REFLUX DISEASE WITHOUT ESOPHAGITIS   Status: 

Chronic   


Qualifiers: 


   Esophagitis presence: esophagitis presence not specified   Qualified Code(s)

: K21.9 - Gastro-esophageal reflux disease without esophagitis   


Comment: stable   





(4) HTN (hypertension)


Code(s): I10 - ESSENTIAL (PRIMARY) HYPERTENSION   Status: Chronic   


Qualifiers: 


   Hypertension type: essential hypertension   Qualified Code(s): I10 - 

Essential (primary) hypertension   


Comment: start PRN IV hydralazine   





(5) Hyperkalemia


Code(s): E87.5 - HYPERKALEMIA   Status: Resolved   





- Plan





* .








Review of Systems





- Review of Systems


Respiratory: negative: Cough, Shortness of Breath, SOB with Excertion, 

Pleuritic Pain, Wheezing


Cardiovascular: negative: chest pain, palpitations, orthopnea, paroxysmal 

nocturnal dyspnea, edema, light headedness





- Medications/Allergies


Allergies/Adverse Reactions: 


 Allergies











Allergy/AdvReac Type Severity Reaction Status Date / Time


 


diazepam [From Valium] Allergy   Verified 08/22/18 04:35


 


Iodinated Contrast- Oral and Allergy   Verified 08/22/18 04:35





IV Dye     





[Iodinated Contrast Media -     





IV Dye]     


 


nitrofurantoin Allergy   Verified 08/22/18 04:35





[From Macrobid]     


 


sodium hypochlorite solution Allergy   Verified 08/22/18 04:35





[sodium hypochlorite]     


 


Sulfa (Sulfonamide Allergy   Verified 08/22/18 04:35





Antibiotics)     


 


sulfamethoxazole Allergy   Verified 08/22/18 04:35





[From Bactrim]     


 


trimethoprim [From Bactrim] Allergy   Verified 08/22/18 04:35


 


sodium hyperchlorite Allergy Unknown  Uncoded 08/22/18 04:35











Medications: 


 Current Medications





Acetaminophen (Tylenol)  650 mg PO Q4H PRN


   PRN Reason: Headache/Fever/Mild Pain (1-3)


   Last Admin: 12/30/18 23:20 Dose:  650 mg


Albuterol/Ipratropium (Duoneb)  3 ml NEB [id number] PRN


   PRN Reason: SOB &/or Wheezing


Ascorbic Acid (Vitamin C)  1,000 mg PO BID [facility]


   Last Admin: 12/31/18 08:16 Dose:  1,000 mg


Aspirin (Ecotrin)  81 mg PO DAILY [facility]


   Last Admin: 12/31/18 08:18 Dose:  81 mg


Atorvastatin Calcium (Lipitor)  80 mg PO HS [facility]


   Last Admin: 12/30/18 20:59 Dose:  80 mg


Budesonide (Pulmicort Neb Solution)  0.5 mg INH BID-RT [facility]


   Last Admin: 12/31/18 07:01 Dose:  0.5 mg


Calcium/Vitamin D (Caltrate 600 + Vit D)  1 tab PO BID [facility]


   Last Admin: 12/31/18 08:19 Dose:  1 tab


Carvedilol (Coreg)  6.25 mg PO QID [facility]


   Last Admin: 12/31/18 17:25 Dose:  Not Given


Cyanocobalamin (Vitamin B-12)  1,000 mcg IM Q28D [facility]


Dextrose/Water (Dextrose 50%)  25 gm SLOW IVP PRN PRN


   PRN Reason: Hypoglycemia


Dronedarone (Multaq)  400 mg PO BID [facility]


   Last Admin: 12/31/18 08:18 Dose:  400 mg


Famotidine (Pepcid)  20 mg PO DAILY [facility]


   Last Admin: 12/31/18 08:17 Dose:  20 mg


Ferrous Sulfate (Feosol)  325 mg PO BID-WM [facility]


   Last Admin: 12/31/18 17:22 Dose:  325 mg


Fish Oil (Fish Oil)  1,000 mg PO DAILY [facility]


   Last Admin: 12/31/18 08:18 Dose:  1,000 mg


Furosemide (Lasix)  40 mg SLOW IVP 0600 [facility]


   Last Admin: 12/31/18 05:32 Dose:  40 mg


Gabapentin (Neurontin)  600 mg PO BID [facility]


   Last Admin: 12/31/18 08:19 Dose:  600 mg


Glipizide (Glucotrol)  10 mg PO DAILY-AC [facility]


   Last Admin: 12/31/18 08:18 Dose:  10 mg


Glucagon (Glucagon)  1 mg IM PRN PRN


   PRN Reason: Hypoglycemia


Hydralazine HCl (Apresoline)  10 mg SLOW IVP Q4H PRN


   PRN Reason: SBP Greater Than 180


   Last Admin: 12/31/18 06:02 Dose:  10 mg


Hydralazine HCl (Apresoline)  37.5 mg PO TID [facility]


   Last Admin: 12/31/18 17:18 Dose:  37.5 mg


Dextrose/Water (D5w)  1,000 mls @ 0 mls/hr IV .Q0M PRN


   PRN Reason: Hypoglycemia


Insulin Human Lispro (Humalog)  0 units SC .MODERATE SLIDING SC PRN


   PRN Reason: Moderate Correctional Scale


   Last Admin: 12/31/18 05:42 Dose:  4 unit


Isosorbide Dinitrate (Isordil)  20 mg PO TID [facility]


   Last Admin: 12/31/18 17:19 Dose:  20 mg


Mometasone Furoate/Formoterol Fumar (Dulera 100 Mcg/5 Mcg Inhaler)  2 puff INH 

BID-RT [facility]


   Last Admin: 12/31/18 07:03 Dose:  2 puff


Multivitamins/Zinc (Stress 600 With Zinc)  1 tab PO DAILY [facility]


   Last Admin: 12/31/18 08:28 Dose:  Not Given


Pantoprazole Sodium (Protonix)  40 mg PO HS [facility]


   Last Admin: 12/30/18 21:00 Dose:  40 mg


Sodium Chloride (Flush - Normal Saline)  10 ml IVF Q12HR [facility]


   Last Admin: 12/31/18 08:29 Dose:  10 ml


Sodium Chloride (Flush - Normal Saline)  10 ml IVF PRN PRN


   PRN Reason: Saline Flush


Terazosin HCl (Hytrin)  5 mg PO DAILY [facility]


   Last Admin: 12/31/18 08:21 Dose:  5 mg


Vitamin A (Vitamin A)  10,000 units PO DAILY [facility]


Zinc Sulfate (Zinc Sulfate)  220 mg PO DAILY [facility]


   Last Admin: 12/31/18 08:17 Dose:  220 mg

## 2018-12-31 NOTE — PRG
DATE OF SERVICE:  12/31/2018



RENAL MEDICINE



SUBJECTIVE:  Mr. Snider is an 83-year-old white male, who was admitted for an

acute kidney injury.  Due to the hyperkalemia, he was initially dialyzed with

improvement of the serum potassium.  Dialysis was placed on hold yesterday.  I 
do

anticipate some renal recovery.  He may simply have a hemodynamically-mediated 
renal

dysfunction on top of his chronic renal failure due to diuretics.  This morning,

voices no new complaints.  He is diuresing.  Denies any chest pain or shortness 
of

breath. 



OBJECTIVE:  VITAL SIGNS:  Blood pressure 196/91, heart rate 84, respiratory 
rate 16,

pulse ox 95% room air, temperature 97.6. 

GENERAL:  Awake, supine, comfortable, not in overt distress.  Obese. 

SKIN:  Adequate turgor. 

HEENT:  Slightly pale conjunctivae.  Anicteric sclerae. 

NECK:  No neck mass.  No carotid bruits.  No JVD. 

CHEST:  No deformities. 

LUNGS:  Clear breath sounds.  No wheezing.  No crackles. 

HEART:  Normal sinus rhythm.  No murmur.  No gallops or rubs. 

ABDOMEN:  Globular, soft, nontender.  No masses. 

EXTREMITIES:  Trace edema.  Decreased motor in lower extremities.



MEDICATIONS:  Medications of December 31, 2018, were reviewed.



LABORATORY DATA:  Laboratories of December 30, 2018, white count 5.9, hemoglobin

9.1.  December 30, 2018, potassium 3.8, BUN 52, creatinine 4.19.  December 31, 2018,

basic metabolic panel pending. 



ASSESSMENT AND PLAN:  

1. Acute kidney injury on top of his chronic renal failure with superimposed

prerenal azotemia.  will start albumin 25 g IV q.6.  Would use diuretics

judiciously. 

2. Congestive heart failure, clinically asymptomatic.  Continue current 
management.

No indication for any emergent dialysis at the present time.  Depending on what 
the

blood work is, I may or may not decide to proceed with another dialytic 
intervention

today.  We will recheck basic metabolic panel and CBC in the morning. 







Job ID:  [id number]



[facility]

## 2019-01-01 LAB
ANION GAP SERPL CALC-SCNC: 18 MMOL/L (ref 10–20)
BASOPHILS # BLD AUTO: 0 THOU/UL (ref 0–0.2)
BASOPHILS NFR BLD AUTO: 0.3 % (ref 0–1)
BUN SERPL-MCNC: 67 MG/DL (ref 8.4–25.7)
CALCIUM SERPL-MCNC: 8.7 MG/DL (ref 7.8–10.44)
CHLORIDE SERPL-SCNC: 99 MMOL/L (ref 98–107)
CO2 SERPL-SCNC: 26 MMOL/L (ref 23–31)
CREAT CL PREDICTED SERPL C-G-VRATE: 24 ML/MIN (ref 70–130)
EOSINOPHIL # BLD AUTO: 0.2 THOU/UL (ref 0–0.7)
EOSINOPHIL NFR BLD AUTO: 4.5 % (ref 0–10)
GLUCOSE SERPL-MCNC: 142 MG/DL (ref 83–110)
HGB BLD-MCNC: 8.5 G/DL (ref 14–18)
LYMPHOCYTES # BLD: 1.1 THOU/UL (ref 1.2–3.4)
LYMPHOCYTES NFR BLD AUTO: 19.8 % (ref 21–51)
MCH RBC QN AUTO: 32 PG (ref 27–31)
MCV RBC AUTO: 93 FL (ref 78–98)
MDIFF COMPLETE?: YES
MICROCYTES BLD QL SMEAR: (no result) (100X)
MONOCYTES # BLD AUTO: 0.7 THOU/UL (ref 0.11–0.59)
MONOCYTES NFR BLD AUTO: 12.5 % (ref 0–10)
NEUTROPHILS # BLD AUTO: 3.4 THOU/UL (ref 1.4–6.5)
NEUTROPHILS NFR BLD AUTO: 62.8 % (ref 42–75)
PLATELET # BLD AUTO: 112 THOU/UL (ref 130–400)
POLYCHROMASIA BLD QL SMEAR: (no result) (100X)
POTASSIUM SERPL-SCNC: 3.1 MMOL/L (ref 3.5–5.1)
RBC # BLD AUTO: 2.65 MILL/UL (ref 4.7–6.1)
SODIUM SERPL-SCNC: 140 MMOL/L (ref 136–145)
WBC # BLD AUTO: 5.4 THOU/UL (ref 4.8–10.8)

## 2019-01-01 RX ADMIN — ALBUMIN HUMAN SCH GM: 250 SOLUTION INTRAVENOUS at 23:14

## 2019-01-01 RX ADMIN — MOMETASONE FUROATE AND FORMOTEROL FUMARATE DIHYDRATE SCH PUFF: 100; 5 AEROSOL RESPIRATORY (INHALATION) at 19:25

## 2019-01-01 RX ADMIN — Medication SCH TAB: at 09:29

## 2019-01-01 RX ADMIN — INSULIN LISPRO PRN UNIT: 100 INJECTION, SOLUTION INTRAVENOUS; SUBCUTANEOUS at 11:37

## 2019-01-01 RX ADMIN — INSULIN LISPRO PRN UNIT: 100 INJECTION, SOLUTION INTRAVENOUS; SUBCUTANEOUS at 17:42

## 2019-01-01 RX ADMIN — Medication SCH MG: at 20:57

## 2019-01-01 RX ADMIN — ASPIRIN SCH MG: 81 TABLET ORAL at 09:29

## 2019-01-01 RX ADMIN — ERYTHROPOIETIN SCH UNITS: 20000 INJECTION, SOLUTION INTRAVENOUS; SUBCUTANEOUS at 13:20

## 2019-01-01 RX ADMIN — Medication SCH MG: at 09:28

## 2019-01-01 RX ADMIN — Medication SCH ML: at 21:01

## 2019-01-01 RX ADMIN — Medication SCH UNITS: at 09:27

## 2019-01-01 RX ADMIN — ALBUMIN HUMAN SCH GM: 250 SOLUTION INTRAVENOUS at 11:39

## 2019-01-01 RX ADMIN — Medication SCH TAB: at 20:58

## 2019-01-01 RX ADMIN — Medication SCH ML: at 09:30

## 2019-01-01 RX ADMIN — INSULIN LISPRO PRN UNIT: 100 INJECTION, SOLUTION INTRAVENOUS; SUBCUTANEOUS at 21:08

## 2019-01-01 RX ADMIN — Medication SCH TAB: at 09:28

## 2019-01-01 RX ADMIN — MOMETASONE FUROATE AND FORMOTEROL FUMARATE DIHYDRATE SCH PUFF: 100; 5 AEROSOL RESPIRATORY (INHALATION) at 07:46

## 2019-01-01 RX ADMIN — ALBUMIN HUMAN SCH GM: 250 SOLUTION INTRAVENOUS at 17:43

## 2019-01-01 NOTE — PDOC.PN
- Subjective


Encounter Start Date: 01/01/19


Encounter Start Time: 09:20





Pt seen for followup re: diastolic CHF exacerbation.  Feels better.





- Objective


Resuscitation Status - Order Detail:





12/28/18 18:25


Resuscitation Status Routine 


   Resuscitation Status: FULL: Full Resuscitation








MAR Reviewed: Yes


Vital Signs & Weight: 


 Vital Signs (12 hours)











  Temp Pulse Resp BP BP Pulse Ox


 


 01/01/19 15:19     164/73 H  


 


 01/01/19 13:21     164/73 H  


 


 01/01/19 09:28   62    


 


 01/01/19 09:27     164/73 H  


 


 01/01/19 08:00       98


 


 01/01/19 07:42   62  20    98


 


 01/01/19 07:38  97.5 F L  62  16   164/73 H  98








 Weight











Admit Weight                   316 lb


 


Weight                         316 lb 12.8 oz














I&O: 


 











 12/31/18 01/01/19 01/02/19





 06:59 06:59 06:59


 


Intake Total 1380  


 


Output Total 1300 560 


 


Balance 80 -560 











Result Diagrams: 


 01/02/19 06:32





 01/02/19 06:32


Additional Labs: 


 Accuchecks











  01/01/19 01/01/19 12/31/18





  11:36 05:09 20:36


 


POC Glucose  216 H  173 H  231 H














  12/31/18





  16:29


 


POC Glucose  218 H








labs reviewed by me





Phys Exam





- Physical Examination


Morbidly obese


HEENT: moist MMs


Neck: supple


Respiratory: clear to auscultation bilateral


Cardiovascular: RRR


Gastrointestinal: soft


Musculoskeletal: edema present


Neurological: moves all 4 limbs


Psychiatric: normal affect


Deviation from normal: wounds as documented





Dx/Plan


(1) Acute on chronic diastolic CHF (congestive heart failure)


Code(s): I50.33 - ACUTE ON CHRONIC DIASTOLIC (CONGESTIVE) HEART FAILURE   Status

: Acute   Comment: Improving with IV furosemide, dialysis on hold   





(2) Hypokalemia


Code(s): E87.6 - HYPOKALEMIA   Status: Acute   Comment: replace potassium   





(3) Acute worsening of stage 3 chronic kidney disease


Code(s): N18.3 - CHRONIC KIDNEY DISEASE, STAGE 3 (MODERATE)   Status: Acute   

Comment: nephrology following   





(4) DM type 2 (diabetes mellitus, type 2)


Status: Chronic   


Qualifiers: 


   Diabetes mellitus long term insulin use: with long term use   Diabetes 

mellitus complication status: with neurologic complications   Diabetes mellitus 

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 

diabetes mellitus with diabetic polyneuropathy; Z79.4 - Long term (current) use 

of insulin   


Comment: reasonable control at this time   





(5) GERD (gastroesophageal reflux disease)


Code(s): K21.9 - GASTRO-ESOPHAGEAL REFLUX DISEASE WITHOUT ESOPHAGITIS   Status: 

Chronic   


Qualifiers: 


   Esophagitis presence: esophagitis presence not specified   Qualified Code(s)

: K21.9 - Gastro-esophageal reflux disease without esophagitis   


Comment: stable   





(6) HTN (hypertension)


Code(s): I10 - ESSENTIAL (PRIMARY) HYPERTENSION   Status: Chronic   


Qualifiers: 


   Hypertension type: essential hypertension   Qualified Code(s): I10 - 

Essential (primary) hypertension   


Comment: continue PRN IV hydralazine   





(7) Hyperkalemia


Code(s): E87.5 - HYPERKALEMIA   Status: Resolved   





- Plan





* .








Review of Systems





- Review of Systems


Cardiovascular: negative: chest pain, palpitations, orthopnea, paroxysmal 

nocturnal dyspnea, edema, light headedness


Gastrointestinal: negative: Nausea, Vomiting, Abdominal Pain, Diarrhea, 

Constipation, Melena, Hematochezia





- Medications/Allergies


Allergies/Adverse Reactions: 


 Allergies











Allergy/AdvReac Type Severity Reaction Status Date / Time


 


diazepam [From Valium] Allergy   Verified 08/22/18 04:35


 


Iodinated Contrast- Oral and Allergy   Verified 08/22/18 04:35





IV Dye     





[Iodinated Contrast Media -     





IV Dye]     


 


nitrofurantoin Allergy   Verified 08/22/18 04:35





[From Macrobid]     


 


sodium hypochlorite solution Allergy   Verified 08/22/18 04:35





[sodium hypochlorite]     


 


Sulfa (Sulfonamide Allergy   Verified 08/22/18 04:35





Antibiotics)     


 


sulfamethoxazole Allergy   Verified 08/22/18 04:35





[From Bactrim]     


 


trimethoprim [From Bactrim] Allergy   Verified 08/22/18 04:35


 


sodium hyperchlorite Allergy Unknown  Uncoded 08/22/18 04:35











Medications: 


 Current Medications





Acetaminophen (Tylenol)  650 mg PO Q4H PRN


   PRN Reason: Headache/Fever/Mild Pain (1-3)


   Last Admin: 01/01/19 13:58 Dose:  650 mg


Albumin Human (Albumin 25%)  25 gm IVPB Q6HR [place]


   Stop: 01/04/19 12:01


   Last Admin: 01/01/19 11:39 Dose:  25 gm


Albuterol/Ipratropium (Duoneb)  3 ml NEB [id number] PRN


   PRN Reason: SOB &/or Wheezing


Ascorbic Acid (Vitamin C)  1,000 mg PO BID [facility]


   Last Admin: 01/01/19 09:28 Dose:  1,000 mg


Aspirin (Ecotrin)  81 mg PO DAILY [facility]


   Last Admin: 01/01/19 09:29 Dose:  81 mg


Atorvastatin Calcium (Lipitor)  80 mg PO HS [facility]


   Last Admin: 12/31/18 21:23 Dose:  80 mg


Budesonide (Pulmicort Neb Solution)  0.5 mg INH BID-RT [facility]


   Last Admin: 01/01/19 07:42 Dose:  0.5 mg


Calcium/Vitamin D (Caltrate 600 + Vit D)  1 tab PO BID [facility]


   Last Admin: 01/01/19 09:28 Dose:  1 tab


Carvedilol (Coreg)  6.25 mg PO QID [facility]


   Last Admin: 01/01/19 13:21 Dose:  6.25 mg


Cyanocobalamin (Vitamin B-12)  1,000 mcg IM Q28D [facility]


Dextrose/Water (Dextrose 50%)  25 gm SLOW IVP PRN PRN


   PRN Reason: Hypoglycemia


Dronedarone (Multaq)  400 mg PO BID [facility]


   Last Admin: 01/01/19 09:27 Dose:  400 mg


Epoetin [place] (Procrit)  7,500 units SC Q7D [facility]


   Last Admin: 01/01/19 13:20 Dose:  7,500 units


Famotidine (Pepcid)  20 mg PO DAILY [facility]


   Last Admin: 01/01/19 09:27 Dose:  20 mg


Ferrous Sulfate (Feosol)  325 mg PO BID-WM [facility]


   Last Admin: 01/01/19 09:28 Dose:  325 mg


Fish Oil (Fish Oil)  1,000 mg PO DAILY [facility]


   Last Admin: 01/01/19 09:29 Dose:  1,000 mg


Furosemide (Lasix)  40 mg SLOW IVP 0600 [facility]


   Last Admin: 01/01/19 06:02 Dose:  40 mg


Gabapentin (Neurontin)  600 mg PO BID [facility]


   Last Admin: 01/01/19 09:28 Dose:  600 mg


Glipizide (Glucotrol)  10 mg PO DAILY-AC [facility]


   Last Admin: 01/01/19 09:28 Dose:  10 mg


Glucagon (Glucagon)  1 mg IM PRN PRN


   PRN Reason: Hypoglycemia


Hydralazine HCl (Apresoline)  37.5 mg PO TID [facility]


   Last Admin: 01/01/19 15:19 Dose:  37.5 mg


Hydralazine HCl (Apresoline)  10 mg SLOW IVP Q6H PRN


   PRN Reason: SBP Greater Than 170


Dextrose/Water (D5w)  1,000 mls @ 0 mls/hr IV .Q0M PRN


   PRN Reason: Hypoglycemia


Insulin Human Lispro (Humalog)  0 units SC .MODERATE SLIDING SC PRN


   PRN Reason: Moderate Correctional Scale


   Last Admin: 01/01/19 11:37 Dose:  4 unit


Isosorbide Dinitrate (Isordil)  20 mg PO TID [facility]


   Last Admin: 01/01/19 15:19 Dose:  20 mg


Mometasone Furoate/Formoterol Fumar (Dulera 100 Mcg/5 Mcg Inhaler)  2 puff INH 

BID-RT [facility]


   Last Admin: 01/01/19 07:46 Dose:  2 puff


Multivitamins/Zinc (Stress 600 With Zinc)  1 tab PO DAILY [facility]


   Last Admin: 01/01/19 09:29 Dose:  1 tab


Pantoprazole Sodium (Protonix)  40 mg PO HS [facility]


   Last Admin: 12/31/18 21:24 Dose:  40 mg


Sodium Chloride (Flush - Normal Saline)  10 ml IVF Q12HR [facility]


   Last Admin: 01/01/19 09:30 Dose:  10 ml


Sodium Chloride (Flush - Normal Saline)  10 ml IVF PRN PRN


   PRN Reason: Saline Flush


Terazosin HCl (Hytrin)  5 mg PO DAILY [facility]


   Last Admin: 01/01/19 09:29 Dose:  5 mg


Vitamin A (Vitamin A)  10,000 units PO DAILY [facility]


   Last Admin: 01/01/19 09:27 Dose:  10,000 units


Zinc Sulfate (Zinc Sulfate)  220 mg PO DAILY [facility]


   Last Admin: 01/01/19 09:29 Dose:  220 mg

## 2019-01-01 NOTE — PRG
DATE OF SERVICE:  01/01/2019



SUBJECTIVE:  Mr. Snider is an 83-year-old white male, who was admitted for volume

overload/hyperkalemia.  He underwent emergent hemodialysis.  I am currently holding

off the dialysis to see if he will have some stabilization of the renal dysfunction.

 He continues to diurese.  He denies any chest pain or shortness of breath.  He is

currently on low-dose diuretics. 



He voices no new complaints.



OBJECTIVE:  VITAL SIGNS:  Blood pressure 164/73, heart rate 62, respiratory rate 20,

temperature 97.5, and pulse ox 98%. 

GENERAL:  Awake, alert, supine, obese, not in distress. 

SKIN:  Adequate turgor. 

HEENT:  He has a slightly pale conjunctivae.  Anicteric sclerae.  No neck mass.  No

carotid bruits.  No JVD. 

CHEST:  No deformities. 

LUNGS:  Clear breath sounds. 

HEART:  Normal sinus rhythm.  No murmurs, gallops, or rubs. 

ABDOMEN:  Globular, soft, nontender.  No masses. 

EXTREMITIES:  Positive for edema.



MEDICATIONS:  Medications of 01/01/2019 were reviewed.



LABORATORY DATA:  Laboratories of 01/01/2019; white count 5.4, hemoglobin 8.5.

Sodium 140, potassium 3.1, chloride 99, carbon dioxide 26, BUN is 67, creatinine

4.79, GFR 12 mL/minute.  Calcium 8.7. 



White count 5.4, hemoglobin 8.5.



ASSESSMENT AND PLAN:  

1. Hyperkalemia, resolved.

2. Acute kidney injury/chronic renal failure-I suspect possibility of

hemodynamically-mediated renal dysfunction.  I am holding dialysis to observe where

the renal function will plateau.  The other possibility is he may have a

superimposed acute tubular necrosis.  Please note that the patient continues to

diurese.  He made about 560 mL of urine output.  I would probably extend his albumin

infusion to 25 g IV q.6 for another three days.  No indication for an emergent

dialysis today. 

3. Mild hypokalemia, KCl 40 mEq once a day.

4. Anemia.  Start Epogen.

5. Recheck basic metabolic, CBC in a.m.







Job ID:  [id number]

## 2019-01-01 NOTE — PDOC.CTH
Cardiology Progress Note





- Subjective





No new issues, Breathing better after HD. 





- Objective


 Vital Signs











  Temp Pulse Resp BP BP Pulse Ox


 


 01/01/19 15:19     164/73 H  


 


 01/01/19 13:21     164/73 H  


 


 01/01/19 09:28   62    


 


 01/01/19 09:27     164/73 H  


 


 01/01/19 08:00       98


 


 01/01/19 07:42   62  20    98


 


 01/01/19 07:38  97.5 F L  62  16   164/73 H  98








 











Admit Weight                   316 lb


 


Weight                         316 lb 12.8 oz














 











 12/31/18 01/01/19 01/02/19





 06:59 06:59 06:59


 


Intake Total 1380  


 


Output Total 1300 560 


 


Balance 80 -560 














- Physical Examination


General/Neuro: alert & oriented x3, NAD


Neck: no JVD present


Lungs: unlabored respirations


Heart: RRR


Abdomen: NT/ND


Extremities: + edema B (2+)





- Labs


Result Diagrams: 


 01/01/19 07:21





 01/01/19 07:21


 Troponin/CKMB











CK-MB (CK-2)  3.6 ng/mL (0-6.6)   12/28/18  15:27    


 


Troponin I  0.036 ng/mL (< 0.028)  H  12/28/18  15:27    














- Assessment/Plan





1. SOB.


2. Anasarca


3. CKD stage 6





PLAN:


- Echo pending.

## 2019-01-02 LAB
ANION GAP SERPL CALC-SCNC: 22 MMOL/L (ref 10–20)
BUN SERPL-MCNC: 75 MG/DL (ref 8.4–25.7)
BURR CELLS BLD QL SMEAR: (no result) (100X)
CALCIUM SERPL-MCNC: 8.9 MG/DL (ref 7.8–10.44)
CHLORIDE SERPL-SCNC: 100 MMOL/L (ref 98–107)
CO2 SERPL-SCNC: 22 MMOL/L (ref 23–31)
CREAT CL PREDICTED SERPL C-G-VRATE: 22 ML/MIN (ref 70–130)
GLUCOSE SERPL-MCNC: 131 MG/DL (ref 83–110)
HGB BLD-MCNC: 8.2 G/DL (ref 14–18)
MCH RBC QN AUTO: 31.5 PG (ref 27–31)
MCV RBC AUTO: 94.1 FL (ref 78–98)
MDIFF COMPLETE?: YES
PLATELET # BLD AUTO: 112 THOU/UL (ref 130–400)
POLYCHROMASIA BLD QL SMEAR: (no result) (100X)
POTASSIUM SERPL-SCNC: 3.6 MMOL/L (ref 3.5–5.1)
RBC # BLD AUTO: 2.6 MILL/UL (ref 4.7–6.1)
SODIUM SERPL-SCNC: 140 MMOL/L (ref 136–145)
WBC # BLD AUTO: 5.5 THOU/UL (ref 4.8–10.8)

## 2019-01-02 PROCEDURE — 5A1D70Z PERFORMANCE OF URINARY FILTRATION, INTERMITTENT, LESS THAN 6 HOURS PER DAY: ICD-10-PCS | Performed by: INTERNAL MEDICINE

## 2019-01-02 RX ADMIN — ASPIRIN SCH MG: 81 TABLET ORAL at 09:16

## 2019-01-02 RX ADMIN — Medication SCH TAB: at 09:10

## 2019-01-02 RX ADMIN — ASPIRIN SCH: 81 TABLET ORAL at 09:16

## 2019-01-02 RX ADMIN — Medication SCH UNITS: at 10:52

## 2019-01-02 RX ADMIN — MOMETASONE FUROATE AND FORMOTEROL FUMARATE DIHYDRATE SCH PUFF: 100; 5 AEROSOL RESPIRATORY (INHALATION) at 19:10

## 2019-01-02 RX ADMIN — Medication SCH ML: at 22:23

## 2019-01-02 RX ADMIN — INSULIN LISPRO PRN UNIT: 100 INJECTION, SOLUTION INTRAVENOUS; SUBCUTANEOUS at 18:00

## 2019-01-02 RX ADMIN — Medication SCH MG: at 09:07

## 2019-01-02 RX ADMIN — INSULIN LISPRO PRN UNIT: 100 INJECTION, SOLUTION INTRAVENOUS; SUBCUTANEOUS at 12:55

## 2019-01-02 RX ADMIN — MOMETASONE FUROATE AND FORMOTEROL FUMARATE DIHYDRATE SCH PUFF: 100; 5 AEROSOL RESPIRATORY (INHALATION) at 06:40

## 2019-01-02 RX ADMIN — INSULIN GLARGINE SCH MLS: 100 INJECTION, SOLUTION SUBCUTANEOUS at 11:03

## 2019-01-02 RX ADMIN — Medication SCH TAB: at 09:07

## 2019-01-02 RX ADMIN — ALBUMIN HUMAN SCH: 250 SOLUTION INTRAVENOUS at 12:15

## 2019-01-02 RX ADMIN — Medication SCH TAB: at 22:20

## 2019-01-02 RX ADMIN — Medication SCH MG: at 22:20

## 2019-01-02 RX ADMIN — ALBUMIN HUMAN SCH GM: 250 SOLUTION INTRAVENOUS at 05:49

## 2019-01-02 RX ADMIN — Medication SCH ML: at 09:16

## 2019-01-02 NOTE — PRG
DATE OF SERVICE:  01/02/2019



RENAL MEDICINE



SUBJECTIVE:  Mr. Snider is an 83-year-old white male, who was seen by the Renal

Service for his acute kidney injury on top of his chronic renal failure.  Due to his

severe hyperkalemia, he underwent hemodialysis for two consecutive days.  The

dialysis is placed temporarily to see if he will have any renal recovery.  So far,

renal function continues to remain unimproved and has worsened.  My plan is to

schedule him for another 3-hour hemodialysis today.  He may have a superimposed ATN.

 He may need a permanent dialysis catheter placement. 



No complaints of chest pain or shortness of breath.



OBJECTIVE:  VITAL SIGNS:  Blood pressure is 179/64, heart rate is 66, respiratory

rate 20, temperature 97.4. 

GENERAL:  Noted to be awake, obese, supine, comfortable. 

SKIN:  Adequate turgor. 

HEENT:  Slightly pale conjunctivae.  Anicteric sclerae. 

NECK:  No neck mass.  No carotid bruits.  No JVD. 

CHEST:  No deformities. 

LUNGS:  Decreased breath sounds. 

HEART:  Normal sinus rhythm.  No murmurs.  No gallops.  No rubs. 

ABDOMEN:  Globular, soft, nontender.  No masses. 

EXTREMITIES:  Positive for edema.



MEDICATIONS:  Medications of January 2, 2019 was reviewed.



LABORATORY DATA:  Laboratories of January 2, 2019; white count 5.5, hemoglobin is

8.2.  Sodium 140, potassium 3.6, chloride 100, carbon dioxide 22, BUN 75, creatinine

5.29, glucose 131, calcium 8.1. 



ASSESSMENT AND PLAN:  

1. Acute kidney injury/chronic renal failure, unimproved renal function.  Renal

function continues to worse.  We will continue hemodialysis today.  I have scheduled

for 3 hours with at least 2 L of fluid removal.  We will again schedule him for

another dialysis treatment tomorrow, longer session of 4 hours. 

2. Congestive heart failure.  Continue fluid removal with dialysis.

3. Anemia, continuing weekly Epogen.

4. Hyperkalemia, resolved.







Job ID:  [id number]

## 2019-01-02 NOTE — PDOC.PN
- Subjective


Encounter Start Date: 01/02/19


Encounter Start Time: 10:20





Pt seen for followup re: CHF exacerbation.  Says he feels well.





- Objective


Resuscitation Status - Order Detail:





12/28/18 18:25


Resuscitation Status Routine 


   Resuscitation Status: FULL: Full Resuscitation








Vital Signs & Weight: 


 Vital Signs (12 hours)











  Temp Pulse Resp BP BP Pulse Ox


 


 01/02/19 12:58     184/65 H  


 


 01/02/19 09:09   66   179/64 H  


 


 01/02/19 09:08     179/64 H  


 


 01/02/19 09:03  97.4 F L  66  20   179/64 H 


 


 01/02/19 08:00  97.3 F L  75  22 H   189/77 H  90 L


 


 01/02/19 06:39   66  16    92 L


 


 01/02/19 05:00  97.6 F  66  18   178/97 H  92 L








 Weight











Admit Weight                   316 lb


 


Weight                         316 lb 12.8 oz














I&O: 


 











 01/01/19 01/02/19 01/03/19





 06:59 06:59 06:59


 


Intake Total  970 


 


Output Total 560 410 


 


Balance -560 560 











Result Diagrams: 


 01/02/19 06:32





 01/02/19 06:32


Additional Labs: 


 Accuchecks











  01/02/19 01/02/19 01/02/19





  11:53 04:52 00:22


 


POC Glucose  221 H  161 H  170 H














  01/01/19 01/01/19





  19:48 16:29


 


POC Glucose  366 H  323 H














Phys Exam





- Physical Examination


Morbid obese


HEENT: moist MMs


Neck: supple


Respiratory: clear to auscultation bilateral


Cardiovascular: RRR


Gastrointestinal: soft


Neurological: moves all 4 limbs


Psychiatric: normal affect


Deviation from normal: Wounds as documented





Dx/Plan


(1) Acute on chronic diastolic CHF (congestive heart failure)


Code(s): I50.33 - ACUTE ON CHRONIC DIASTOLIC (CONGESTIVE) HEART FAILURE   Status

: Acute   Comment: for dialysis today   





(2) Acute worsening of stage 3 chronic kidney disease


Code(s): N18.3 - CHRONIC KIDNEY DISEASE, STAGE 3 (MODERATE)   Status: Acute   

Comment: for dialysis today   





(3) DM type 2 (diabetes mellitus, type 2)


Status: Chronic   


Qualifiers: 


   Diabetes mellitus long term insulin use: with long term use   Diabetes 

mellitus complication status: with neurologic complications   Diabetes mellitus 

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 

diabetes mellitus with diabetic polyneuropathy; Z79.4 - Long term (current) use 

of insulin   


Comment: reasonable control   





(4) GERD (gastroesophageal reflux disease)


Code(s): K21.9 - GASTRO-ESOPHAGEAL REFLUX DISEASE WITHOUT ESOPHAGITIS   Status: 

Chronic   


Qualifiers: 


   Esophagitis presence: esophagitis presence not specified   Qualified Code(s)

: K21.9 - Gastro-esophageal reflux disease without esophagitis   


Comment: stable   





(5) HTN (hypertension)


Code(s): I10 - ESSENTIAL (PRIMARY) HYPERTENSION   Status: Chronic   


Qualifiers: 


   Hypertension type: essential hypertension   Qualified Code(s): I10 - 

Essential (primary) hypertension   


Comment: on PRN IV hydralazine   





(6) Hyperkalemia


Code(s): E87.5 - HYPERKALEMIA   Status: Resolved   





(7) Hypokalemia


Code(s): E87.6 - HYPOKALEMIA   Status: Resolved   





- Plan





* .








Review of Systems





- Review of Systems


Cardiovascular: negative: chest pain, palpitations, orthopnea, paroxysmal 

nocturnal dyspnea, edema, light headedness


Gastrointestinal: negative: Nausea, Vomiting, Abdominal Pain, Diarrhea, 

Constipation, Melena, Hematochezia





- Medications/Allergies


Allergies/Adverse Reactions: 


 Allergies











Allergy/AdvReac Type Severity Reaction Status Date / Time


 


diazepam [From Valium] Allergy   Verified 08/22/18 04:35


 


Iodinated Contrast- Oral and Allergy   Verified 08/22/18 04:35





IV Dye     





[Iodinated Contrast Media -     





IV Dye]     


 


nitrofurantoin Allergy   Verified 08/22/18 04:35





[From Macrobid]     


 


sodium hypochlorite solution Allergy   Verified 08/22/18 04:35





[sodium hypochlorite]     


 


Sulfa (Sulfonamide Allergy   Verified 08/22/18 04:35





Antibiotics)     


 


sulfamethoxazole Allergy   Verified 08/22/18 04:35





[From Bactrim]     


 


trimethoprim [From Bactrim] Allergy   Verified 08/22/18 04:35


 


sodium hyperchlorite Allergy Unknown  Uncoded 08/22/18 04:35











Medications: 


 Current Medications





Acetaminophen (Tylenol)  650 mg PO Q4H PRN


   PRN Reason: Headache/Fever/Mild Pain (1-3)


   Last Admin: 01/01/19 13:58 Dose:  650 mg


Albuterol/Ipratropium (Duoneb)  3 ml NEB [id number] PRN


   PRN Reason: SOB &/or Wheezing


Apixaban (Eliquis)  5 mg PO BID [facility]


   Last Admin: 01/02/19 09:15 Dose:  5 mg


Ascorbic Acid (Vitamin C)  1,000 mg PO BID [facility]


   Last Admin: 01/02/19 09:07 Dose:  1,000 mg


Aspirin (Ecotrin)  81 mg PO DAILY [facility]


   Last Admin: 01/02/19 09:16 Dose:  81 mg


Atorvastatin Calcium (Lipitor)  80 mg PO HS [facility]


   Last Admin: 01/01/19 20:57 Dose:  80 mg


Budesonide (Pulmicort Neb Solution)  0.5 mg INH BID-RT [facility]


   Last Admin: 01/02/19 06:39 Dose:  0.5 mg


Calcium/Vitamin D (Caltrate 600 + Vit D)  1 tab PO BID [facility]


   Last Admin: 01/02/19 09:10 Dose:  1 tab


Carvedilol (Coreg)  6.25 mg PO QID [facility]


   Last Admin: 01/02/19 12:58 Dose:  6.25 mg


Cyanocobalamin (Vitamin B-12)  1,000 mcg IM [id number] [facility]


Dextrose/Water (Dextrose 50%)  25 gm SLOW IVP PRN PRN


   PRN Reason: Hypoglycemia


Dronedarone (Multaq)  400 mg PO BID [facility]


   Last Admin: 01/02/19 09:08 Dose:  400 mg


Epoetin Franklin (Procrit)  7,500 units SC Q7D [facility]


   Last Admin: 01/01/19 13:20 Dose:  7,500 units


Ferrous Sulfate (Feosol)  325 mg PO BID-WM [facility]


   Last Admin: 01/02/19 09:10 Dose:  325 mg


Fish Oil (Fish Oil)  1,000 mg PO DAILY [facility]


   Last Admin: 01/02/19 09:07 Dose:  1,000 mg


Furosemide (Lasix)  40 mg SLOW IVP 0600 [facility]


   Last Admin: 01/02/19 05:50 Dose:  40 mg


Gabapentin (Neurontin)  600 mg PO BID [facility]


   Last Admin: 01/02/19 09:09 Dose:  Not Given


Gabapentin (Neurontin)  900 mg PO BID [facility]


   Last Admin: 01/02/19 09:15 Dose:  900 mg


Glipizide (Glucotrol)  10 mg PO DAILY-AC [facility]


   Last Admin: 01/02/19 09:10 Dose:  10 mg


Glucagon (Glucagon)  1 mg IM PRN PRN


   PRN Reason: Hypoglycemia


Hydralazine HCl (Apresoline)  37.5 mg PO TID [facility]


   Last Admin: 01/02/19 09:09 Dose:  37.5 mg


Hydralazine HCl (Apresoline)  10 mg SLOW IVP Q6H PRN


   PRN Reason: SBP Greater Than 170


Dextrose/Water (D5w)  1,000 mls @ 0 mls/hr IV .Q0M PRN


   PRN Reason: Hypoglycemia


Insulin Glargine 30 units/ (Miscellaneous Medication)  0.3 mls @ 0 mls/hr SC 

QAM [facility]


   Last Admin: 01/02/19 11:03 Dose:  0.3 mls


Insulin Human Lispro (Humalog)  0 units SC .MODERATE SLIDING SC PRN


   PRN Reason: Moderate Correctional Scale


   Last Admin: 01/02/19 12:55 Dose:  4 unit


Isosorbide Dinitrate (Isordil)  20 mg PO TID [facility]


   Last Admin: 01/02/19 09:08 Dose:  20 mg


Mometasone Furoate/Formoterol Fumar (Dulera 100 Mcg/5 Mcg Inhaler)  2 puff INH 

BID-RT [facility]


   Last Admin: 01/02/19 06:40 Dose:  2 puff


Multivitamins/Zinc (Stress 600 With Zinc)  1 tab PO DAILY [facility]


   Last Admin: 01/02/19 09:07 Dose:  1 tab


Pantoprazole Sodium (Protonix)  40 mg PO HS [facility]


   Last Admin: 01/01/19 20:59 Dose:  40 mg


Sodium Chloride (Flush - Normal Saline)  10 ml IVF Q12HR [facility]


   Last Admin: 01/02/19 09:16 Dose:  10 ml


Sodium Chloride (Flush - Normal Saline)  10 ml IVF PRN PRN


   PRN Reason: Saline Flush


Terazosin HCl (Hytrin)  5 mg PO DAILY [facility]


   Last Admin: 01/02/19 09:08 Dose:  5 mg


Vitamin A (Vitamin A)  10,000 units PO DAILY [facility]


   Last Admin: 01/02/19 10:52 Dose:  10,000 units


Zinc Sulfate (Zinc Sulfate)  220 mg PO DAILY [facility]


   Last Admin: 01/02/19 11:03 Dose:  220 mg

## 2019-01-03 LAB
ANION GAP SERPL CALC-SCNC: 18 MMOL/L (ref 10–20)
BUN SERPL-MCNC: 50 MG/DL (ref 8.4–25.7)
CALCIUM SERPL-MCNC: 8.2 MG/DL (ref 7.8–10.44)
CHLORIDE SERPL-SCNC: 100 MMOL/L (ref 98–107)
CO2 SERPL-SCNC: 25 MMOL/L (ref 23–31)
CREAT CL PREDICTED SERPL C-G-VRATE: 28 ML/MIN (ref 70–130)
GLUCOSE SERPL-MCNC: 117 MG/DL (ref 83–110)
HGB BLD-MCNC: 8.5 G/DL (ref 14–18)
MCH RBC QN AUTO: 31.3 PG (ref 27–31)
MCV RBC AUTO: 96.4 FL (ref 78–98)
MDIFF COMPLETE?: YES
PLATELET # BLD AUTO: 128 THOU/UL (ref 130–400)
POLYCHROMASIA BLD QL SMEAR: (no result) (100X)
POTASSIUM SERPL-SCNC: 3.5 MMOL/L (ref 3.5–5.1)
RBC # BLD AUTO: 2.7 MILL/UL (ref 4.7–6.1)
SODIUM SERPL-SCNC: 139 MMOL/L (ref 136–145)
WBC # BLD AUTO: 6 THOU/UL (ref 4.8–10.8)

## 2019-01-03 PROCEDURE — 5A1D70Z PERFORMANCE OF URINARY FILTRATION, INTERMITTENT, LESS THAN 6 HOURS PER DAY: ICD-10-PCS | Performed by: INTERNAL MEDICINE

## 2019-01-03 RX ADMIN — Medication SCH ML: at 08:05

## 2019-01-03 RX ADMIN — Medication SCH: at 19:56

## 2019-01-03 RX ADMIN — INSULIN LISPRO PRN UNIT: 100 INJECTION, SOLUTION INTRAVENOUS; SUBCUTANEOUS at 17:36

## 2019-01-03 RX ADMIN — INSULIN LISPRO PRN UNIT: 100 INJECTION, SOLUTION INTRAVENOUS; SUBCUTANEOUS at 12:57

## 2019-01-03 RX ADMIN — ASPIRIN SCH MG: 81 TABLET ORAL at 08:03

## 2019-01-03 RX ADMIN — Medication SCH TAB: at 08:01

## 2019-01-03 RX ADMIN — INSULIN GLARGINE SCH MLS: 100 INJECTION, SOLUTION SUBCUTANEOUS at 08:27

## 2019-01-03 RX ADMIN — Medication SCH MG: at 08:02

## 2019-01-03 RX ADMIN — Medication SCH TAB: at 08:04

## 2019-01-03 RX ADMIN — Medication SCH UNITS: at 08:02

## 2019-01-03 RX ADMIN — Medication SCH TAB: at 19:54

## 2019-01-03 RX ADMIN — Medication SCH MG: at 19:54

## 2019-01-03 RX ADMIN — MOMETASONE FUROATE AND FORMOTEROL FUMARATE DIHYDRATE SCH PUFF: 100; 5 AEROSOL RESPIRATORY (INHALATION) at 19:15

## 2019-01-03 RX ADMIN — MOMETASONE FUROATE AND FORMOTEROL FUMARATE DIHYDRATE SCH PUFF: 100; 5 AEROSOL RESPIRATORY (INHALATION) at 07:15

## 2019-01-03 NOTE — PRG
DATE OF SERVICE:  01/03/2019



RENAL MEDICINE



SUBJECTIVE:  Mr. Snider is an 83-year-old white male, who was admitted for 
acute

kidney injury and hyperkalemia.  He was initiated on dialysis.  We held off the

dialysis temporarily, but there was no evidence of renal recovery.  For this 
reason,

I have resumed him back on his regular dialysis.  I have consulted surgery for a

permanent dialysis catheter placement.  In addition, case management has been

consulted for dialysis outpatient placement.  No new complaints today. 



OBJECTIVE:  VITAL SIGNS:  Blood pressure is 128/75, heart rate 59, respiratory 
rate

20, temperature 97.7, and pulse ox 97%. 

GENERAL:  Awake, alert, supine, morbidly obese. 

SKIN:  Adequate turgor. 

HEENT:  Slightly pale conjunctivae.  Anicteric sclerae.  No neck mass.  No 
carotid

bruits.  No JVD. 

CHEST:  No deformities. 

LUNGS:  Decreased breath sounds. 

HEART:  Normal sinus rhythm.  No murmur.  No gallops.  No rubs. 

ABDOMEN:  Globular, soft, and nontender.  No masses. 

EXTREMITIES:  Trace edema.



MEDICATIONS:  Medications of January 3, 2018, reviewed.



LABORATORY DATA:  Laboratories of January 3, 2019; white count 6, hemoglobin 
8.5.

Sodium 139, potassium 3.5, chloride 100, carbon dioxide 25, BUN 58, creatinine 
4.17,

and calcium 8.2. 



ASSESSMENT AND PLAN:  

1. Acute kidney injury/chronic renal failure.  No evidence of renal recovery.

Continuing hemodialysis regimen.  The patient will be scheduled for a 4-hour

hemodialysis today.  Fluid removal will be attempted as tolerated by the 
patient. 

2. Anemia-continue Epogen and iron supplementation with this patient.

3. Congestive heart failure, max out fluid removal.  Please note that his Lasix 
has

been discontinued due to the initiation of dialysis. 

4. Diabetic neuropathy-in view of the decreased GFR, I will adjust the 
gabapentin

for renal dosing.  He is currently taking the gabapentin at 600 mg p.o. b.i.d. 
at one

time, but this has recently been increased to 900 mg p.o. b.i.d.  Discussed 
with the

hospitalist.  Maybe we need to resume him back on a lower dose gabapentin.  So 
far,

he seems to be tolerating this.  I will recheck basic metabolic profile and CBC 
in

the a.m. 







Job ID:  [id number]



[facility]

## 2019-01-03 NOTE — HP
HISTORY OF PRESENT ILLNESS:  Martín Snider is an 83-year-old male patient

with renal failure, and I have been asked to see him regarding placement of

hemodialysis catheter. Unfortunately, he still is on his anticoagulant, Eliquis, for

his atrial fibrillation; I discontinued that this morning. He did receive a dose

this morning. I have been asked to see him regarding placement of a hemodialysis

catheter. We will also plan on placement of central line. He has a temporary right

groin dialysis catheter that he has been using for dialysis. Ultrasound vein mapping

has been ordered for planning future dialysis access, this is pending. The patient

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

infection of the right foot and recent wound on the dorsum of his left foot; Wound

Care has been attending to. The wound on the right foot is healing with nonsurgical

treatment. This had been prolonged care. The patient is nonambulatory since his fall

several years ago. His wife states that he became nonambulatory after he was ordered

nonweightbearing on his left foot and then the ankylosing spondylitis disease and

his fall limited his mobility to a wheelchair. The patient was admitted to the

Hospitalist Service, has been seeing Dr. Jordan, Dr. Henok Dye, has been seen

by Dr. Roland Rosado. 



ALLERGIES:  BACTRIM, IODINE, MACROBID, SULFA, AND VALIUM.



PAST MEDICAL HISTORY:  

1. Chronic diastolic heart failure.

2. Sleep apnea.

3. Paraplegia, mobility in a wheelchair.

4. Diabetes mellitus type 2.

5. Morbid obesity.

6. Metabolic syndrome.

7. History of coronary artery disease with history of coronary stent.

8. History of nonsustained ventricular tachycardia.

9. Atrial fibrillation, on anticoagulation.

10. Chronic history of ankylosing spondylitis.

11. History of spinal cord injury related to a fall related to above.

12. History of GI bleeding.

13. Hypertension.

14. Dyslipidemia.

15. History of DVT, left leg, August 2018.

16. Chronic venous stasis.



PAST SURGICAL HISTORY:  Loop recorder, EGD, colonoscopy, hemorrhoidectomy that I

performed in the past, cardiac catheterization, finger surgery. 



MEDICATIONS AT HOME:  Include,

1. Tylenol.

2. DuoNeb.

3. Vitamin C.

4. Ecotrin.

5. Lipitor.

6. Calcium-Vitamin D.

7. Coreg 6.25 mg q.i.d.

8. Vitamin B12.

9. Multaq 400 mg b.i.d.

10. Procrit.

11. Ferrous sulfate.

12. Gabapentin 900 mg b.i.d.

13. Glipizide 10 mg a.c. daily.

14. Hydralazine 37.5 mg p.o. t.i.d.

15. Sliding scale insulin.

16. Isosorbide 20 mg t.i.d.

17. Multivitamins.

18. Protonix daily.

19. Hytrin 5 mg a day.

20. Zinc sulfate daily.

21. Eliquis 5 mg p.o. b.i.d., held.



REVIEW OF SYSTEMS:  Noncontributory.



PHYSICAL EXAMINATION:

VITAL SIGNS: 6 feet tall, 316 pounds, 43 BMI. 

GENERAL: Morbidly obese. 

HEAD, EARS, EYES, NOSE, AND THROAT: Unremarkable. 

LUNGS: Clear to auscultation. 

CARDIAC: __________ rhythm without murmur or gallop. 

ABDOMEN: Soft, obese, nontender. 

EXTREMITIES: Palpable radial pulses bilaterally. Right groin catheter dialysis.

Chronic venous stasis changes. Foot drop bilaterally. Bandage is still on the dorsum

of the left foot and over the right heel foot area. 



LABORATORY DATA:  White count 6, hemoglobin 8.5. Sodium 139, potassium 3.5,

creatinine 4.17, and GFR 14. 



ASSESSMENT AND PLAN:  

1. Renal failure with chronic kidney disease related to diabetes, hypertension,

morbid obesity, and metabolic syndrome. We will plan on placement of hemodialysis

catheter tomorrow. He has eaten breakfast this morning. Eliquis has been given

today, we will hold that. We will plan placement of a triple-lumen catheter for IV

access and to preserve veins. Await vein mapping. Consider long-term dialysis access

next week pending discussion with Nephrology. 

2. Other medical problems noted above.







Job ID:  [id number]

## 2019-01-03 NOTE — PDOC.PN
- Subjective


Encounter Start Date: 01/03/19


Encounter Start Time: 07:00





Pt seen for followup re:  CHF exacerbation. Says he feels better.





- Objective


Resuscitation Status - Order Detail:





12/28/18 18:25


Resuscitation Status Routine 


   Resuscitation Status: FULL: Full Resuscitation








MAR Reviewed: Yes


Vital Signs & Weight: 


 Vital Signs (12 hours)











  Temp Pulse Resp BP BP Pulse Ox


 


 01/03/19 14:55     105/67  


 


 01/03/19 14:54   59 L   105/67  


 


 01/03/19 11:04  97.6 F  59 L  20   117/68  95


 


 01/03/19 08:03   60   128/75  


 


 01/03/19 08:02     128/75  


 


 01/03/19 07:55       98


 


 01/03/19 07:52  97.7 F  59 L  20   128/75  97


 


 01/03/19 07:15   60  14   


 


 01/03/19 04:00  97.3 F L  55 L  20   131/68  93 L








 Weight











Admit Weight                   316 lb


 


Weight                         316 lb 12.8 oz














I&O: 


 











 01/02/19 01/03/19 01/04/19





 06:59 06:59 06:59


 


Intake Total 970 580 


 


Output Total 410 350 


 


Balance 560 230 











Result Diagrams: 


 01/03/19 06:26





 01/03/19 06:26


Additional Labs: 


 Accuchecks











  01/03/19 01/03/19 01/02/19





  11:41 04:53 19:29


 


POC Glucose  249 H  145 H  137 H














  01/02/19





  17:47


 


POC Glucose  280 H








labs reviewed by me





Phys Exam





- Physical Examination


Morbid obesity


HEENT: moist MMs


Neck: supple


Respiratory: clear to auscultation bilateral


Cardiovascular: RRR


Gastrointestinal: soft


Musculoskeletal: edema present


Neurological: moves all 4 limbs


Psychiatric: normal affect


Deviation from normal: wounds as documented





Dx/Plan


(1) Acute on chronic diastolic CHF (congestive heart failure)


Code(s): I50.33 - ACUTE ON CHRONIC DIASTOLIC (CONGESTIVE) HEART FAILURE   Status

: Acute   Comment: Improving, pt to have dialysis today.     





(2) Acute worsening of stage 3 chronic kidney disease


Code(s): N18.3 - CHRONIC KIDNEY DISEASE, STAGE 3 (MODERATE)   Status: Acute   

Comment: for dialysis today.  Dr. Dye is consulting Dr. Finley for more 

permanent hemodialysis access.   





(3) DM type 2 (diabetes mellitus, type 2)


Status: Chronic   


Qualifiers: 


   Diabetes mellitus long term insulin use: with long term use   Diabetes 

mellitus complication status: with neurologic complications   Diabetes mellitus 

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 

diabetes mellitus with diabetic polyneuropathy; Z79.4 - Long term (current) use 

of insulin   


Comment: reasonably controlled   





(4) GERD (gastroesophageal reflux disease)


Code(s): K21.9 - GASTRO-ESOPHAGEAL REFLUX DISEASE WITHOUT ESOPHAGITIS   Status: 

Chronic   


Qualifiers: 


   Esophagitis presence: esophagitis presence not specified   Qualified Code(s)

: K21.9 - Gastro-esophageal reflux disease without esophagitis   


Comment: stable   





(5) HTN (hypertension)


Code(s): I10 - ESSENTIAL (PRIMARY) HYPERTENSION   Status: Chronic   


Qualifiers: 


   Hypertension type: essential hypertension   Qualified Code(s): I10 - 

Essential (primary) hypertension   


Comment: continue PRN IV hydralazine   





(6) Hyperkalemia


Code(s): E87.5 - HYPERKALEMIA   Status: Resolved   





(7) Hypokalemia


Code(s): E87.6 - HYPOKALEMIA   Status: Resolved   





- Plan





* .








Review of Systems





- Review of Systems


Respiratory: negative: Cough, Shortness of Breath, SOB with Excertion, 

Pleuritic Pain, Wheezing


Cardiovascular: negative: chest pain, palpitations, orthopnea, paroxysmal 

nocturnal dyspnea, edema, light headedness





- Medications/Allergies


Allergies/Adverse Reactions: 


 Allergies











Allergy/AdvReac Type Severity Reaction Status Date / Time


 


diazepam [From Valium] Allergy   Verified 08/22/18 04:35


 


Iodinated Contrast- Oral and Allergy   Verified 08/22/18 04:35





IV Dye     





[Iodinated Contrast Media -     





IV Dye]     


 


nitrofurantoin Allergy   Verified 08/22/18 04:35





[From Macrobid]     


 


sodium hypochlorite solution Allergy   Verified 08/22/18 04:35





[sodium hypochlorite]     


 


Sulfa (Sulfonamide Allergy   Verified 08/22/18 04:35





Antibiotics)     


 


sulfamethoxazole Allergy   Verified 08/22/18 04:35





[From Bactrim]     


 


trimethoprim [From Bactrim] Allergy   Verified 08/22/18 04:35


 


sodium hyperchlorite Allergy Unknown  Uncoded 08/22/18 04:35











Medications: 


 Current Medications





Acetaminophen (Tylenol)  650 mg PO Q4H PRN


   PRN Reason: Headache/Fever/Mild Pain (1-3)


   Last Admin: 01/01/19 13:58 Dose:  650 mg


Albuterol/Ipratropium (Duoneb)  3 ml NEB [id number] PRN


   PRN Reason: SOB &/or Wheezing


   Last Admin: 01/03/19 07:15 Dose:  3 ml


Ascorbic Acid (Vitamin C)  1,000 mg PO BID [facility]


   Last Admin: 01/03/19 08:02 Dose:  1,000 mg


Aspirin (Ecotrin)  81 mg PO DAILY [facility]


   Last Admin: 01/03/19 08:03 Dose:  81 mg


Atorvastatin Calcium (Lipitor)  80 mg PO HS [facility]


   Last Admin: 01/02/19 22:20 Dose:  80 mg


Budesonide (Pulmicort Neb Solution)  0.5 mg INH BID-RT [facility]


   Last Admin: 01/03/19 07:15 Dose:  0.5 mg


Calcium/Vitamin D (Caltrate 600 + Vit D)  1 tab PO BID [facility]


   Last Admin: 01/03/19 08:04 Dose:  1 tab


Carvedilol (Coreg)  6.25 mg PO QID [facility]


   Last Admin: 01/03/19 14:55 Dose:  Not Given


Cyanocobalamin (Vitamin B-12)  1,000 mcg IM [id number] [facility]


Dextrose/Water (Dextrose 50%)  25 gm SLOW IVP PRN PRN


   PRN Reason: Hypoglycemia


Dronedarone (Multaq)  400 mg PO BID [facility]


   Last Admin: 01/03/19 08:02 Dose:  400 mg


Epoetin Franklin (Procrit)  7,500 units SC Q7D [facility]


   Last Admin: 01/01/19 13:20 Dose:  7,500 units


Ferrous Sulfate (Feosol)  325 mg PO BID-WM [facility]


   Last Admin: 01/03/19 08:04 Dose:  325 mg


Fish Oil (Fish Oil)  1,000 mg PO DAILY [facility]


   Last Admin: 01/03/19 08:04 Dose:  1,000 mg


Gabapentin (Neurontin)  900 mg PO BID [facility]


   Last Admin: 01/03/19 08:04 Dose:  900 mg


Glipizide (Glucotrol)  10 mg PO DAILY-AC [facility]


   Last Admin: 01/03/19 08:04 Dose:  10 mg


Glucagon (Glucagon)  1 mg IM PRN PRN


   PRN Reason: Hypoglycemia


Hydralazine HCl (Apresoline)  37.5 mg PO TID [facility]


   Last Admin: 01/03/19 14:54 Dose:  Not Given


Hydralazine HCl (Apresoline)  10 mg SLOW IVP Q6H PRN


   PRN Reason: SBP Greater Than 170


Dextrose/Water (D5w)  1,000 mls @ 0 mls/hr IV .Q0M PRN


   PRN Reason: Hypoglycemia


Insulin Glargine 30 units/ (Miscellaneous Medication)  0.3 mls @ 0 mls/hr SC 

QAM [facility]


   Last Admin: 01/03/19 08:27 Dose:  0.3 mls


Insulin Human Lispro (Humalog)  0 units SC .MODERATE SLIDING SC PRN


   PRN Reason: Moderate Correctional Scale


   Last Admin: 01/03/19 12:57 Dose:  4 unit


Isosorbide Dinitrate (Isordil)  20 mg PO TID [facility]


   Last Admin: 01/03/19 14:54 Dose:  Not Given


Mometasone Furoate/Formoterol Fumar (Dulera 100 Mcg/5 Mcg Inhaler)  2 puff INH 

BID-RT [facility]


   Last Admin: 01/03/19 07:15 Dose:  2 puff


Multivitamins/Zinc (Stress 600 With Zinc)  1 tab PO DAILY [facility]


   Last Admin: 01/03/19 08:01 Dose:  1 tab


Read Ppd Test Site  0 each PO ONE [facility]


   Stop: 01/04/19 22:00


Read Ppd Test Site  0 each PO 2000 [facility]


   Stop: 01/05/19 22:00


Pantoprazole Sodium (Protonix)  40 mg PO HS [facility]


   Last Admin: 01/02/19 22:20 Dose:  40 mg


Sodium Chloride (Flush - Normal Saline)  10 ml IVF Q12HR [facility]


   Last Admin: 01/03/19 08:05 Dose:  10 ml


Sodium Chloride (Flush - Normal Saline)  10 ml IVF PRN PRN


   PRN Reason: Saline Flush


Terazosin HCl (Hytrin)  5 mg PO DAILY [facility]


   Last Admin: 01/03/19 08:05 Dose:  5 mg


Vitamin A (Vitamin A)  10,000 units PO DAILY [facility]


   Last Admin: 01/03/19 08:02 Dose:  10,000 units


Zinc Sulfate (Zinc Sulfate)  220 mg PO DAILY [facility]


   Last Admin: 01/03/19 08:01 Dose:  220 mg

## 2019-01-03 NOTE — ULT
BILATERAL UPPER EXTREMITY VEIN MAPPING ULTRASOUND: 

1/3/19

 

COMPARISON:  

None.

 

HISTORY: 

End-stage renal disease. 

 

TECHNIQUE:  

Gray scale and color doppler evaluation of the upper extremity vascular structures with color flow an
d spectral analysis obtained as detailed below. 

 

FINDINGS:  

Bilateral internal jugular vein, subclavian vein, and axillary veins demonstrate patency.

 

VESSEL                  DIAMETER (mm) 

RIGHT BRACHIAL ARTERY:  4.7 mm  

RIGHT RADIAL ARTERY:    3.1 mm

RIGHT ULNAR ARTERY:     2.7 mm

 

LEFT BRACHIAL ARTERY:      4.0 mm

LEFT RADIAL ARTERY:      2.1 mm 

LEFT ULNAR ARTERY:      2.3 mm

 

RIGHT CEPHALIC VEIN: 

Above elbow proximal:      2.1 mm

Above elbow mid:            2.2 mm

Above elbow distal:      2.8 mm

At elbow:                  2.2 mm

Below elbow proximal:      2.6 mm

Below elbow mid:            2.0 mm 

Below elbow distal:      2.4 mm 

      

RIGHT BASILIC VEIN

Above elbow proximal:      5.4 mm

Above elbow mid:            5.9 mm

Above elbow distal:      5.0 mm

At elbow:                  4.0 mm

Below elbow proximal:      1.8 mm

Below elbow mid:            2.1 mm

Below elbow distal:      2.3 mm

 

LEFT BASILIC VEIN

Above elbow proximal:      4.4 mm

Above elbow mid:            4.4 mm

Above elbow distal:      3.8 mm

At elbow:                  2.4 mm

Below elbow proximal:      2.2 mm

Below elbow mid:            1.9 mm

Below elbow distal:      2.1 mm 

 

LEFT CEPHALIC VEIN

Above elbow proximal:      2.8 mm

Above elbow mid:            4.0 mm

Above elbow distal:      3.3 mm

At elbow:                  3.2 mm

Below elbow proximal:      2.0 mm 

Below elbow mid:            2.0 mm

Below elbow distal:      1.2 mm

 

IMPRESSION:

Vein mapping for dialysis access as detailed above. 

 

POS: [facility]

## 2019-01-04 LAB
ANION GAP SERPL CALC-SCNC: 18 MMOL/L (ref 10–20)
BASOPHILS # BLD AUTO: 0 THOU/UL (ref 0–0.2)
BASOPHILS NFR BLD AUTO: 0.2 % (ref 0–1)
BUN SERPL-MCNC: 67 MG/DL (ref 8.4–25.7)
CALCIUM SERPL-MCNC: 8.4 MG/DL (ref 7.8–10.44)
CHLORIDE SERPL-SCNC: 100 MMOL/L (ref 98–107)
CO2 SERPL-SCNC: 26 MMOL/L (ref 23–31)
CREAT CL PREDICTED SERPL C-G-VRATE: 22 ML/MIN (ref 70–130)
EOSINOPHIL # BLD AUTO: 0.4 THOU/UL (ref 0–0.7)
EOSINOPHIL NFR BLD AUTO: 6.2 % (ref 0–10)
GLUCOSE SERPL-MCNC: 146 MG/DL (ref 83–110)
HGB BLD-MCNC: 7.8 G/DL (ref 14–18)
LYMPHOCYTES # BLD: 1 THOU/UL (ref 1.2–3.4)
LYMPHOCYTES NFR BLD AUTO: 14.3 % (ref 21–51)
MCH RBC QN AUTO: 31.3 PG (ref 27–31)
MCV RBC AUTO: 95.7 FL (ref 78–98)
MONOCYTES # BLD AUTO: 0.9 THOU/UL (ref 0.11–0.59)
MONOCYTES NFR BLD AUTO: 12.5 % (ref 0–10)
NEUTROPHILS # BLD AUTO: 4.8 THOU/UL (ref 1.4–6.5)
NEUTROPHILS NFR BLD AUTO: 66.8 % (ref 42–75)
PLATELET # BLD AUTO: 125 THOU/UL (ref 130–400)
POTASSIUM SERPL-SCNC: 4.1 MMOL/L (ref 3.5–5.1)
RBC # BLD AUTO: 2.5 MILL/UL (ref 4.7–6.1)
SODIUM SERPL-SCNC: 140 MMOL/L (ref 136–145)
WBC # BLD AUTO: 7.1 THOU/UL (ref 4.8–10.8)

## 2019-01-04 PROCEDURE — 0JH63XZ INSERTION OF TUNNELED VASCULAR ACCESS DEVICE INTO CHEST SUBCUTANEOUS TISSUE AND FASCIA, PERCUTANEOUS APPROACH: ICD-10-PCS | Performed by: SURGERY

## 2019-01-04 PROCEDURE — 02HV33Z INSERTION OF INFUSION DEVICE INTO SUPERIOR VENA CAVA, PERCUTANEOUS APPROACH: ICD-10-PCS | Performed by: SURGERY

## 2019-01-04 RX ADMIN — Medication SCH ML: at 08:20

## 2019-01-04 RX ADMIN — Medication SCH TAB: at 08:28

## 2019-01-04 RX ADMIN — Medication SCH MG: at 08:19

## 2019-01-04 RX ADMIN — INSULIN GLARGINE SCH MLS: 100 INJECTION, SOLUTION SUBCUTANEOUS at 08:23

## 2019-01-04 RX ADMIN — MOMETASONE FUROATE AND FORMOTEROL FUMARATE DIHYDRATE SCH PUFF: 100; 5 AEROSOL RESPIRATORY (INHALATION) at 06:41

## 2019-01-04 RX ADMIN — ASPIRIN SCH MG: 81 TABLET ORAL at 08:20

## 2019-01-04 RX ADMIN — Medication SCH UNITS: at 08:21

## 2019-01-04 RX ADMIN — Medication SCH MG: at 20:45

## 2019-01-04 RX ADMIN — MOMETASONE FUROATE AND FORMOTEROL FUMARATE DIHYDRATE SCH PUFF: 100; 5 AEROSOL RESPIRATORY (INHALATION) at 19:59

## 2019-01-04 RX ADMIN — Medication SCH TAB: at 20:45

## 2019-01-04 RX ADMIN — Medication SCH ML: at 20:46

## 2019-01-04 RX ADMIN — Medication SCH TAB: at 08:19

## 2019-01-04 NOTE — PRG
DATE OF SERVICE:  01/04/2019



SUBJECTIVE:  The patient is seen and examined at the bedside.  He just came back

from the dialysis.  He had a tunneled catheter placed by Dr. Finley. 



OBJECTIVE:  VITAL SIGNS:  Blood pressure is 112/50, pulse is 58, temperature is

98.0, respirations 18, O2 saturation 94% on 2 L by nasal cannula. 

SKIN:  Palish. 

HEENT:  Conjunctivae, palish.  Sclerae, nonicteric. 

LUNGS:  Breath sounds diminished at both bases with crackles bilaterally at the

base.  No wheezing. 

HEART:  S1 and S2, distant.  No S3.  No S4. 

ABDOMEN:  Obese, soft, nontender. 

EXTREMITIES:  2+ peripheral edema similar bilaterally in the lower extremities. 

NEUROLOGIC:  He has strong arms.  He is able to move his lower extremities, but he

is not able to stand and hold any position upright by himself.  He answers my

questions quite properly.  He knows placed, time, and location. 



LABORATORY DATA:  None today.



IMPRESSION:  

1. Congestive heart failure, acute on chronic, diastolic.

2. Worsening renal failure.  Dr. Dye and Dr. Finley are on the case and hemodialysis

access is obtained. 

3. Diabetes mellitus, type 2.  Reasonably controlled.

4. Gastroesophageal reflux disease, chronic, stable.

5. Hypertension, chronic, stable.

6. Hyperkalemia, resolved.

7. Hypokalemia, resolved.



PLAN:  Continuation of renal failure treatment.  Continue his current regimen, which

includes glipizide, gabapentin, ferous sulfate, Multaq, aspirin, atorvastatin,

terazosin, pantoprazole, isosorbide dinitrate, and hydralazine. 





Job ID:  [id number]

## 2019-01-04 NOTE — PQF
CLINICAL DOCUMENTATION IMPROVEMENT CLARIFICATION FORM:  ICD-10 Updated

PLEASE DO AN ADDENDUM TO THE PROGRESS NOTE WITH ANY DOCUMENTATION UPDATES OR 
ADDITIONS AND CARRY THROUGH TO DC SUMMARY.   THANK YOU.



DATE:      1/4/2019                             ATTN:  Dr. Dye



Please exercise your independent, professional judgment in responding to the 
clarification form. 

Clinical indicators are provided on the bottom of this form for your review



Please check appropriate box(s):

[  ] Acute Renal Failure (ARF) / Acute Kidney Injury (PRIETO) 

[x  ] Acute Tubular Necrosis (ATN) 

[  ] Other Etiology or underlying conditions related to the diagnosis of ARF/ 
PRIETO: __________

              [  ] Other:  ___________ 

[ x ] Acute on Chronic Renal Failure

              please specify Stage of CKD ____5____ 

[  ] ESRD

[  x] Other diagnosis ____chf_______

[  ] Unable to determine



In addition, please specify:

Present on Admission (POA):  [  x] Yes             [  ] No             [  ] 
Unable to determine



For continuity of documentation, please document condition throughout progress 
notes and discharge summary.  Thank You.



CLINICAL INDICATORS - SIGNS / SYMPTOMS / LABS



H&P 12/28/18: Over the past 2 weeks, he has noticed that his urine output has 
decreased

                      despite taking Lasix and metolazone. 

                      creatinine  of  6.6     potassium  6.5

                             

Nephrology pn 1/2/2019:  So  far, renal function continues to remain unimproved 
and has worsened.

                                      He may have a superimposed ATN. 

                                      Creatinine 5.29



Nephrology pn 1/3/2019:  Acute kidney injury/ chronic renal failure. No 
evidence of renal recovery.



RISKS:

Nephrology Consult 12/28: Acute kidney injury on top of chronic renal failure, 
superimposed 

prerenal azotemia.  CHF. Hyperkalemia.





TREATMENT:

Nephrology 12/28: Emergent hemodialysis today.

Nephrology  1/04: We will proceed for placement of a permanent cuffed 
hemodialysis

                           catheter as well as arteriovenous fistula.

_____________________________________________________



Thank you,

Mildred

(This form is maintained as a part of the permanent medical record)

 2015 [employer].  All Rights Reserved

Mildred Stearns RN, BSN    [email or web address]    Office: [phone]

                                                              

 

[facility]

## 2019-01-04 NOTE — RAD
CHEST 1 VIEW:

 

Date:  01/04/19 

 

HISTORY:  

83-year-old male with history of right venous access catheter placement. 

 

FINDINGS:

There is a left central line and a right dual lumen venous access catheter in place since the 12/28/1
8 study. Loop recorder overlying the left chest. Rods and pedicle screws stabilize the thoracolumbar 
vertebral column. There is evidence for right pleural effusion, as well as bilateral vascular congest
ion. Stable appearance from prior study. 

 

IMPRESSION: 

Overall stable pleural and parenchymal opacity changes in the right chest. No pneumothorax. No signif
icant new process. 

 

 

POS: NATALIE

## 2019-01-04 NOTE — PRG
DATE OF SERVICE:  01/04/2019



SUBJECTIVE:  Mr. Snider is an 83-year-old white male, who was seen for his acute

kidney injury/chronic renal failure.  He underwent hemodialysis due to the severe

hyperkalemia.  I stopped the dialysis for a few days, but no evidence of renal

recovery was noted.  We attempted to dialyze him.  However, the catheter was poorly

functional.  For that reason, we discontinued the dialysis yesterday.  He is now

scheduled for placement of a permanent cuffed dialysis catheter as well as AV

fistula.  I had a long discussion with the patient, the wife and the daughter

regarding long-term prognosis.  They have agreed to try the hemodialysis.  I gave

them the option that if there is no renal recovery in the near future, he could

discontinue the dialysis and place him on hospice.  No other complaints today.  No

chest pain or shortness of breath. 



OBJECTIVE:  VITAL SIGNS:  Blood pressure 129/72, heart rate 52, respiratory rate 18,

temperature 97.6, and pulse ox 93%. 

GENERAL:  Awake, alert, obese, comfortable, not in distress. 

SKIN:  Adequate turgor. 

HEENT:  Slightly pale conjunctivae.  Anicteric sclerae. 

NECK:  No neck mass.  No carotid bruits.  No JVD. 

CHEST:  No deformities. 

LUNGS:  Clear breath sounds.  No wheezing.  No crackles. 

HEART:  Normal sinus rhythm.  No murmur.  No gallops.  No rubs. 

ABDOMEN:  Globular, soft, nontender.  No masses. 

EXTREMITIES:  No edema.  No deformities.



MEDICATIONS:  Medications of January 4, 2018, was reviewed.



LABORATORY DATA:  Laboratories of January 4, 2019, white count 7.1, hemoglobin 7.8.

Sodium 140, potassium 4.1, chloride 100, carbon dioxide 26, BUN 67, creatinine 5.19,

glucose 146, calcium 8.4. 



ASSESSMENT AND PLAN:  

1. Acute kidney injury/chronic renal failure.  No evidence of renal recovery.  We

will proceed for placement of a permanent cuffed hemodialysis catheter as well as

arteriovenous fistula.  We will continue dialysis regimen.  The patient is scheduled 

for dialysis after the said procedure.

2. Anemia.  Continuing weekly Epogen.  P.r.n. blood transfusion.

3. Congestive heart failure, clinically improved.

4. Hyperkalemia, resolved with dialysis.  Overall, prognosis remains guarded.







Job ID:  [id number]

## 2019-01-04 NOTE — OP
DATE OF PROCEDURE:  01/04/2019



PREOPERATIVE DIAGNOSES:  End-stage renal disease, paraplegia, ankylosing spondylitis.



POSTOPERATIVE DIAGNOSES:  End-stage renal disease, paraplegia, ankylosing

spondylitis. 



PROCEDURES PERFORMED:  Right IJ-cuffed tunneled hemodialysis catheter, left internal

jugular vein triple-lumen catheter, fluoroscopy and ultrasound used. 



ANESTHESIA:  Intravenous sedation, local of 0.5% Marcaine with epinephrine 30 mL

mixed with 2% Xylocaine 10 mL, 20 mL mixture used. 



DESCRIPTION OF PROCEDURE:  The patient was taken to the operating room, where under

intravenous sedation, neck and chest were prepared with ChloraPrep and draped in

routine fashion.  Local anesthetic mixture was infiltrated into the skin and

subcutaneous tissue about the operative sites.  Using the ultrasound guidance, the

right and left internal jugular veins were cannulated with a trocar catheter and

J-wire was threaded and trocar catheter was removed.  Skin was incised and enlarged

sharply.  Stab incision was made over the right chest.  Seldinger technique was used

to place a triple-lumen catheter placed in the left internal jugular vein, securing

with 3-0 nylon suture, sterile dressings, Dermabond.  Each port aspirated blood,

flushed with saline solution. 



On the right side, the tunneling device used to tunnel the pre-curved [employer]

cuffed-tunneled hemodialysis catheter between the 2 incisions, placed the fabric

cuff beneath the skin exit site.  Catheter was secured with 2 sutures of 3-0 nylon

and sterile dressings were applied.  Small and medium size dilators were placed over

the J-wire into the internal jugular vein removed.  Dilator and Peel-Away sheath was

placed over the J-wire in superior vena cava.  Dilator and J-wire were removed.

Catheter was placed through the Peel-Away sheath.  Peel-Away sheath was removed.

Platysma was approximated with 4-0 Monocryl, skin with subdermal 4-0 Monocryl.  Each

port of the dialysis catheter aspirated blood, flushed with saline solution and

heparinized saline solution with 1000 units of heparin per mL indication volume of

the port.  Final fluoroscopic images revealed good line placement.  The patient

tolerated the procedure well. 



Plan is for left arm fistula on Monday.







Job ID:  [id number]

## 2019-01-05 PROCEDURE — 5A1D70Z PERFORMANCE OF URINARY FILTRATION, INTERMITTENT, LESS THAN 6 HOURS PER DAY: ICD-10-PCS | Performed by: INTERNAL MEDICINE

## 2019-01-05 RX ADMIN — Medication SCH: at 08:08

## 2019-01-05 RX ADMIN — Medication SCH: at 08:07

## 2019-01-05 RX ADMIN — Medication SCH ML: at 20:28

## 2019-01-05 RX ADMIN — MOMETASONE FUROATE AND FORMOTEROL FUMARATE DIHYDRATE SCH PUFF: 100; 5 AEROSOL RESPIRATORY (INHALATION) at 07:22

## 2019-01-05 RX ADMIN — Medication SCH TAB: at 20:27

## 2019-01-05 RX ADMIN — Medication SCH MG: at 20:25

## 2019-01-05 RX ADMIN — ASPIRIN SCH: 81 TABLET ORAL at 08:07

## 2019-01-05 RX ADMIN — MOMETASONE FUROATE AND FORMOTEROL FUMARATE DIHYDRATE SCH: 100; 5 AEROSOL RESPIRATORY (INHALATION) at 20:05

## 2019-01-05 RX ADMIN — INSULIN GLARGINE SCH: 100 INJECTION, SOLUTION SUBCUTANEOUS at 13:28

## 2019-01-05 NOTE — PRG
DATE OF SERVICE:  01/05/2019



SUBJECTIVE:  Mr. Snider is an 83-year-old white male, who was admitted for acute

kidney injury.  He was initiated on dialysis due to his hyperkalemia and volume

overload.  He is currently undergoing dialysis today.  A permanent cuffed dialysis

catheter was placed yesterday.  The catheter is working well.  No other complaints. 



OBJECTIVE:  VITAL SIGNS:  Blood pressure is 146/63, heart rate 63, respiratory rate

18, temperature 97.4, and pulse ox 96%. 

GENERAL:  Awake, alert, comfortable, not in distress. 

SKIN:  Adequate turgor.  Morbidly obese. 

HEENT:  He has a slightly pale conjunctivae.  Anicteric sclerae. 

NECK:  No neck mass.  No carotid bruits.  No JVD. 

CHEST:  No deformities. 

LUNGS:  Decreased breath sounds. 

HEART:  Normal sinus rhythm.  No murmur.  No gallops.  No rubs. 

ABDOMEN:  Globular, soft, nontender.  No masses. 

EXTREMITIES:  No edema.  No deformities.



MEDICATIONS:  Medications of January 5, 2019, reviewed.



LABORATORY DATA:  Laboratories of January 4, 2019, white count 7.1, hemoglobin 7.8.

Sodium 140, potassium 4.1, chloride 100, carbon dioxide 26, BUN 67, creatinine 5.19,

glucose 146, calcium 8.4. 



ASSESSMENT AND PLAN:  

1. Acute kidney injury/chronic renal failure - no evidence of renal recovery.  Our

plan is to maintain him on maintenance hemodialysis three times a week.  Fluid

removal as tolerated.  Awaiting outpatient dialysis placement. 

2. Anemia.  The patient has been started on Epogen 7500 units subcu q.week. 



Case discussed at length with the patient and his wife, as well as daughter

regarding long-term prognosis. 





Job ID:  [id number]

## 2019-01-05 NOTE — PRG
DATE OF SERVICE:  01/05/2019



SUBJECTIVE:  The patient is seen and examined at the bedside.  He just came back

from the hemodialysis unit.  He feels weak and tired post procedure. 



OBJECTIVE:  VITAL SIGNS:  Blood pressure is 159/72, pulse is 63, temperature 98.1,

respiratory rate is 18, and O2 saturation is 96% on 1.5 L.  He is obese man with BMI

of 43. 

SKIN:  Shows several ecchymotic areas on his upper extremities.  Skin looks somewhat

pale. 

HEENT:  His pupils respond to light properly.  Oral mucosa is moist. 

LUNGS:  Breath sounds somewhat diminished at both bases. 

HEART:  S1 and S2, is somewhat irregular.  No S3.  No S4. 

ABDOMEN:  Obese, nontender, nondistended. 

EXTREMITIES:  1 to 2+ peripheral edema, similar bilaterally on lower extremities. 



He has a right upper chest tunneled catheter in place and he has a central line in

the internal jugular vein.  He has a Brown catheter in. 



LABORATORY DATA:  None today.



IMPRESSION:  

1. Worsening kidney function.  The patient is getting dialyzed per Dr. Dye, his

nephrologist.  Outpatient hemodialysis placement is in process. 

2. Congestive heart failure, acute on chronic diastolic.

3. Diabetes mellitus type 2.

4. Multiple ecchymotic areas suggestive of some clotting abnormalities.

5. Gastroesophageal reflux disease, chronic, stable.

6. Hypertension, chronic, stable.

7. Hyperkalemia, resolved.

8. Hypokalemia, resolved.



PLAN:  Continuation of renal failure management per Dr. Dye.  Continue glipizide,

gabapentin, ferrous sulfate, Multaq, aspirin, atorvastatin, prazosin, pantoprazole, 

isosorbide dinitrate, and hydralazine.  We are going to check his PT, INR and

platelets since there is some thrombocytopenia present on previous labs. 







Job ID:  [id number]

## 2019-01-06 LAB
APTT PPP: 36.9 SEC (ref 22.9–36.1)
INR PPP: 1.4
PLATELET # BLD AUTO: 127 THOU/UL (ref 130–400)
PROTHROMBIN TIME: 17.1 SEC (ref 12–14.7)

## 2019-01-06 RX ADMIN — Medication SCH TAB: at 08:27

## 2019-01-06 RX ADMIN — MOMETASONE FUROATE AND FORMOTEROL FUMARATE DIHYDRATE SCH PUFF: 100; 5 AEROSOL RESPIRATORY (INHALATION) at 18:59

## 2019-01-06 RX ADMIN — Medication SCH ML: at 20:16

## 2019-01-06 RX ADMIN — Medication SCH TAB: at 08:29

## 2019-01-06 RX ADMIN — Medication SCH ML: at 08:29

## 2019-01-06 RX ADMIN — Medication SCH MG: at 08:27

## 2019-01-06 RX ADMIN — MOMETASONE FUROATE AND FORMOTEROL FUMARATE DIHYDRATE SCH PUFF: 100; 5 AEROSOL RESPIRATORY (INHALATION) at 11:52

## 2019-01-06 RX ADMIN — Medication SCH MG: at 20:15

## 2019-01-06 RX ADMIN — Medication SCH TAB: at 20:16

## 2019-01-06 RX ADMIN — Medication SCH UNITS: at 08:27

## 2019-01-06 RX ADMIN — INSULIN GLARGINE SCH MLS: 100 INJECTION, SOLUTION SUBCUTANEOUS at 08:26

## 2019-01-06 NOTE — PRG
DATE OF SERVICE:  01/06/2019



SUBJECTIVE:  Mr. Snider is an 83-year-old white man, who came in with acute

kidney injury on top of his chronic renal failure.  He was volume overloaded and he

was hyperkalemic.  Hemodialysis had been initiated.  He is currently on maintenance

hemodialysis.  No evidence of renal recovery.  He has had cuffed hemodialysis

catheter placed.  No new complaints this morning.  He is still feeling tired. 



OBJECTIVE:  VITAL SIGNS:  Blood pressure 102/68, heart rate 62, respiratory rate 16,

temperature 98.5, and pulse ox 99%. 

GENERAL:  Noted to be awake, supine, comfortable, somewhat lethargic, morbidly

obese. 

SKIN:  Adequate turgor. 

HEENT:  Slightly pale conjunctivae.  Anicteric sclerae.  No neck mass.  No carotid

bruits.  No JVD. 

CHEST:  No deformities. 

LUNGS:  Clear breath sounds.  No wheezing.  No crackles. 

HEART:  Normal sinus rhythm.  No murmur.  No gallops.  No rubs. 

ABDOMEN:  Globular, soft, nontender.  No masses. 

EXTREMITIES:  Trace edema.  No deformities.



MEDICATIONS:  Medications of January 6, 2019, was reviewed.



LABORATORY DATA:  Laboratories of January 4, 2019, hemoglobin 7.8. 



Sodium 140, potassium 4.1, chloride 100, carbon dioxide 26, BUN 67, creatinine 5.19,

calcium 8.4. 



ASSESSMENT AND PLAN:  

1. Acute kidney injury/chronic renal failure.  No evidence of renal recovery,

continuing three times a week hemodialysis.  He underwent dialysis yesterday without

any difficulty.  We will continue current management.  Continue three times a week 

dialysis.  Awaiting outpatient dialysis placement.

2. Anemia.  Continuing weekly Epogen.  P.r.n. blood transfusion.

3. Congestive heart failure, resolved.

4. Recheck basic metabolic profile and CBC in the morning.







Job ID:  [id number]

## 2019-01-06 NOTE — PRG
DATE OF SERVICE:  01/06/2019



SUBJECTIVE:  The patient is seen and examined at bedside.  He is not having much

complaints to offer.  He feels better.  Yesterday, he felt quite weak after

dialysis, but today is somewhat better. 



OBJECTIVE:  VITAL SIGNS:  His blood pressure is 102/68, pulse is 62, temperature

98.5, respiratory rate is 16, O2 saturation is 99 on nasal cannula 1.5 L. 

GENERAL:  He has some areas in the upper extremities bruised. 

HEENT:  His pupils are responding to light properly.  Sclerae are nonicteric.

Conjunctivae, palish.  Oral mucosa is moist. 

NECK:  Obese. 

LUNGS:  Breath sounds diminished at both bases. 

HEART:  S1 and S2, distant.  No S3.  No S4.  Somewhat irregular. 

ABDOMEN:  Obese, nontender.  Bowel sounds are present. 

EXTREMITIES:  1 to 2+ peripheral edema similar bilaterally that is chronic. 

NEUROLOGIC:  He is able to communicate with me, but that is quite limited.



LABORATORY DATA:  Platelet count is 127.  PT of 17.1, INR 1.4, APTT 36.9.  Glucose

101. 



IMPRESSION:  

1. Worsening kidney function, started on dialysis per Nephrology.  I believe he

tolerated 3 hour hemodialysis yesterday. 

2. Congestive heart failure acute on chronic diastolic.

3. Diabetes mellitus type 2, controlled.

4. Multiple ecchymotic areas suggestive of some clotting abnormality with a mildly

elevated INR and a mildly decreased platelet count. 

5. Gastroesophageal reflux disease, chronic, stable.

6. Hypertension, chronic, stable.

7. Hyperkalemia, resolved.



PLAN:  Plan is to continue his renal failure management per Dr. Dye.  Continue

glipizide and all his other medications.  Continue PT and  is working on

arranging his outpatient hemodialysis after he is discharged home. 







Job ID:  [id number]

## 2019-01-07 LAB
ANION GAP SERPL CALC-SCNC: 14 MMOL/L (ref 10–20)
BASOPHILS # BLD AUTO: 0 THOU/UL (ref 0–0.2)
BASOPHILS NFR BLD AUTO: 0.2 % (ref 0–1)
BUN SERPL-MCNC: 57 MG/DL (ref 8.4–25.7)
CALCIUM SERPL-MCNC: 8 MG/DL (ref 7.8–10.44)
CHLORIDE SERPL-SCNC: 99 MMOL/L (ref 98–107)
CO2 SERPL-SCNC: 28 MMOL/L (ref 23–31)
CREAT CL PREDICTED SERPL C-G-VRATE: 23 ML/MIN (ref 70–130)
EOSINOPHIL # BLD AUTO: 0.6 THOU/UL (ref 0–0.7)
EOSINOPHIL NFR BLD AUTO: 11.5 % (ref 0–10)
GLUCOSE SERPL-MCNC: 125 MG/DL (ref 83–110)
HGB BLD-MCNC: 6.6 G/DL (ref 14–18)
LYMPHOCYTES # BLD: 1.4 THOU/UL (ref 1.2–3.4)
LYMPHOCYTES NFR BLD AUTO: 23.9 % (ref 21–51)
MCH RBC QN AUTO: 31.3 PG (ref 27–31)
MCV RBC AUTO: 95 FL (ref 78–98)
MONOCYTES # BLD AUTO: 0.7 THOU/UL (ref 0.11–0.59)
MONOCYTES NFR BLD AUTO: 12.9 % (ref 0–10)
NEUTROPHILS # BLD AUTO: 2.9 THOU/UL (ref 1.4–6.5)
NEUTROPHILS NFR BLD AUTO: 51.5 % (ref 42–75)
PLATELET # BLD AUTO: 128 THOU/UL (ref 130–400)
POTASSIUM SERPL-SCNC: 4.2 MMOL/L (ref 3.5–5.1)
RBC # BLD AUTO: 2.09 MILL/UL (ref 4.7–6.1)
SODIUM SERPL-SCNC: 137 MMOL/L (ref 136–145)
WBC # BLD AUTO: 5.6 THOU/UL (ref 4.8–10.8)

## 2019-01-07 PROCEDURE — 30233N1 TRANSFUSION OF NONAUTOLOGOUS RED BLOOD CELLS INTO PERIPHERAL VEIN, PERCUTANEOUS APPROACH: ICD-10-PCS | Performed by: INTERNAL MEDICINE

## 2019-01-07 PROCEDURE — 05BY0ZZ EXCISION OF UPPER VEIN, OPEN APPROACH: ICD-10-PCS | Performed by: SURGERY

## 2019-01-07 PROCEDURE — 031C09F BYPASS LEFT RADIAL ARTERY TO LOWER ARM VEIN WITH AUTOLOGOUS VENOUS TISSUE, OPEN APPROACH: ICD-10-PCS | Performed by: SURGERY

## 2019-01-07 RX ADMIN — INSULIN GLARGINE SCH: 100 INJECTION, SOLUTION SUBCUTANEOUS at 12:52

## 2019-01-07 RX ADMIN — Medication SCH: at 09:12

## 2019-01-07 RX ADMIN — Medication SCH TAB: at 20:22

## 2019-01-07 RX ADMIN — MOMETASONE FUROATE AND FORMOTEROL FUMARATE DIHYDRATE SCH PUFF: 100; 5 AEROSOL RESPIRATORY (INHALATION) at 06:30

## 2019-01-07 RX ADMIN — Medication SCH: at 09:11

## 2019-01-07 RX ADMIN — Medication SCH MG: at 20:24

## 2019-01-07 RX ADMIN — Medication SCH: at 09:13

## 2019-01-07 RX ADMIN — MOMETASONE FUROATE AND FORMOTEROL FUMARATE DIHYDRATE SCH PUFF: 100; 5 AEROSOL RESPIRATORY (INHALATION) at 18:52

## 2019-01-07 RX ADMIN — Medication SCH ML: at 20:24

## 2019-01-07 RX ADMIN — Medication SCH: at 09:17

## 2019-01-07 NOTE — PRG
DATE OF SERVICE:  01/07/2019



SUBJECTIVE:  The patient is seen and examined at bedside.  His wife is present in

the room during my visit.  He just came back from the procedure.  He had left

arteriovenous fistula done by Dr. Finley.  He is sleeping during my visit.  Case is

discussed with his wife. 



OBJECTIVE:  VITAL SIGNS:  His blood pressure is 132/72, pulse is 82, temperature is

97.5, respirations 16, O2 saturation 95%. 

GENERAL:  He has several bruises on his left and right upper extremities.  The area

of recent surgery on the left arm is sutured. 

LUNGS:  Breath sounds diminished at both bases. 

HEART:  S1 and S2, somewhat distant.  No S3.  No S4. 

ABDOMEN:  Obese, nondistended, and nontender. 

EXTREMITIES:  No clubbing, cyanosis.  There is 1+ peripheral edema similar

bilaterally on lower extremities. 

NEUROLOGIC:  Postponed since he is presently sleeping after the procedure.



LABORATORY DATA:  Labs showed white count of 5.6, hemoglobin 6.6, hematocrit 19.9,

and platelet count 128,000.  Sodium of 137, potassium 4.2, chloride 99, CO2 of 28,

BUN 59, creatinine 5.03, glycemia is ranging from 101 to 209, and calcium 8.0. 



IMPRESSION:  

1. Worsening kidney function to the point that he was started on dialysis.  He

tolerated dialysis so far per protocol per Dr. Dye.   is working on an

outpatient hemodialysis placement for this patient. 

2. Congestive heart failure, acute on chronic, diastolic, improved.

3. Diabetes mellitus type 2, controlled.

4. Gastroesophageal reflux disease, chronic, stable.

5. Hypertension, chronic, stable.

6. Hyperkalemia, resolved.

7. Left forearm arteriovenous fistula creation today.



PLAN:  Continue current regimen until we have arrangements for outpatient

hemodialysis.  He is going to continue on his atorvastatin, budesonide, Multaq,

Neurontin, glipizide, hydralazine, Isordil, Dulera, pantoprazole, terazosin, vitamin

A, and zinc. 







Job ID:  [id number]

## 2019-01-07 NOTE — OP
DATE OF PROCEDURE:  01/07/2019



PREOPERATIVE DIAGNOSES:  End-stage renal disease, multiple IV access, ecchymosis,

left upper arm cephalic vein anteriorly and above the antecubital fossa, morbid

obesity. 



POSTOPERATIVE DIAGNOSES:  End-stage renal disease, multiple IV access, ecchymosis,

left upper arm cephalic vein anteriorly and above the antecubital fossa, morbid

obesity. 



PROCEDURES:  Left arm primary fistula, perforating branch of antecubital vein to the

proximal radial artery outflow primary cephalic vein also to the basilic vein with

communication retrograde, antecubital vein preserved. 



ANESTHESIA:  Regional TIVA, note coronary dilators from 2 mm to 3.5 mm coronary

dilator passed out, cephalic and basilic vein outflow without obstruction. 



DESCRIPTION OF PROCEDURE:  The patient was taken to the operating room under

regional anesthesia and IV sedation.  Left upper extremity was prepared with

ChloraPrep and draped in routine fashion.  An incision was made in the proximal

volar forearm longitudinally below the antecubital fossa, carried down through the

skin and subcutaneous tissue, deep antecubital vein identified.  There was

ecchymosis in the subcutaneous tissues from IV draws in this end-stage renal disease

patient.  This matched ecchymosis in the skin above the antecubital fossa.  The

veins however were felt to be patent.  Perforating branch antecubital vein dissected

free and branches were divided between clips and 4-0 silk ties and spatulated over

branch point.  The patient was given 6000 units of heparin intravenously.  After

adequate circulation time, the proximal radial artery was clamped proximally and

distally.  Longitudinal arteriotomy was made sharply, elongated with Boyer scissors

for 2.5 cm anastomosis.  The perforating branch of antecubital vein interrogated

with coronary dilators from 2 mm to 4 mm coronary dilator passed and unobstructed

out of cephalic and basilic vein outflow.  Anatomic considerations felt the primary

outflow was the cephalic vein, but there may be some significant flow in the basilic

vein.  In perforating branch antecubital vein anastomosis branch of the proximal

radial artery with continuous suture of 6-0 Prolene.  Branches ligated with 4-0 silk

ties, vascular flow released.  There was good hemostasis. 

The patient was given 25 mg of protamine intravenously by Anesthesia.  Hemostasis

noted.  There was good flow in the fistula interrogated by Doppler.  Subcutaneous

tissue was approximated with 3-0 Monocryl, skin with subdermal 4-0 Monocryl, and

Derma glue applied. 







Job ID:  [id number]

## 2019-01-08 LAB
ANION GAP SERPL CALC-SCNC: 19 MMOL/L (ref 10–20)
BASOPHILS # BLD AUTO: 0 THOU/UL (ref 0–0.2)
BASOPHILS NFR BLD AUTO: 0.2 % (ref 0–1)
BUN SERPL-MCNC: 70 MG/DL (ref 8.4–25.7)
CALCIUM SERPL-MCNC: 8.4 MG/DL (ref 7.8–10.44)
CHLORIDE SERPL-SCNC: 100 MMOL/L (ref 98–107)
CO2 SERPL-SCNC: 23 MMOL/L (ref 23–31)
CREAT CL PREDICTED SERPL C-G-VRATE: 19 ML/MIN (ref 70–130)
EOSINOPHIL # BLD AUTO: 0.6 THOU/UL (ref 0–0.7)
EOSINOPHIL NFR BLD AUTO: 9.3 % (ref 0–10)
GLUCOSE SERPL-MCNC: 139 MG/DL (ref 83–110)
HBSAG INDEX: 0.13 S/CO (ref 0–0.99)
HBV SURFACE AB SERPL IA-ACNC: 1.15 MIU/ML
HEP B CORE TOTAL INDEX: 0.05 S/CO (ref 0–0.79)
HEP C INDEX: 0.07 S/CO (ref 0–0.79)
HGB BLD-MCNC: 8 G/DL (ref 14–18)
LYMPHOCYTES # BLD: 1.1 THOU/UL (ref 1.2–3.4)
LYMPHOCYTES NFR BLD AUTO: 15.8 % (ref 21–51)
MCH RBC QN AUTO: 30.4 PG (ref 27–31)
MCV RBC AUTO: 94.4 FL (ref 78–98)
MONOCYTES # BLD AUTO: 0.8 THOU/UL (ref 0.11–0.59)
MONOCYTES NFR BLD AUTO: 11.8 % (ref 0–10)
NEUTROPHILS # BLD AUTO: 4.3 THOU/UL (ref 1.4–6.5)
NEUTROPHILS NFR BLD AUTO: 62.8 % (ref 42–75)
PLATELET # BLD AUTO: 137 THOU/UL (ref 130–400)
POTASSIUM SERPL-SCNC: 4.5 MMOL/L (ref 3.5–5.1)
RBC # BLD AUTO: 2.62 MILL/UL (ref 4.7–6.1)
SODIUM SERPL-SCNC: 137 MMOL/L (ref 136–145)
WBC # BLD AUTO: 6.9 THOU/UL (ref 4.8–10.8)

## 2019-01-08 PROCEDURE — 5A1D70Z PERFORMANCE OF URINARY FILTRATION, INTERMITTENT, LESS THAN 6 HOURS PER DAY: ICD-10-PCS | Performed by: INTERNAL MEDICINE

## 2019-01-08 RX ADMIN — INSULIN GLARGINE SCH MLS: 100 INJECTION, SOLUTION SUBCUTANEOUS at 08:11

## 2019-01-08 RX ADMIN — Medication SCH MG: at 21:10

## 2019-01-08 RX ADMIN — Medication SCH TAB: at 08:10

## 2019-01-08 RX ADMIN — ERYTHROPOIETIN SCH UNITS: 20000 INJECTION, SOLUTION INTRAVENOUS; SUBCUTANEOUS at 17:34

## 2019-01-08 RX ADMIN — Medication SCH UNITS: at 08:15

## 2019-01-08 RX ADMIN — Medication SCH TAB: at 21:10

## 2019-01-08 RX ADMIN — MOMETASONE FUROATE AND FORMOTEROL FUMARATE DIHYDRATE SCH PUFF: 100; 5 AEROSOL RESPIRATORY (INHALATION) at 07:07

## 2019-01-08 RX ADMIN — MOMETASONE FUROATE AND FORMOTEROL FUMARATE DIHYDRATE SCH: 100; 5 AEROSOL RESPIRATORY (INHALATION) at 20:18

## 2019-01-08 RX ADMIN — Medication SCH TAB: at 08:15

## 2019-01-08 RX ADMIN — Medication SCH ML: at 21:12

## 2019-01-08 RX ADMIN — Medication SCH: at 08:24

## 2019-01-08 RX ADMIN — Medication SCH: at 08:18

## 2019-01-08 NOTE — PDOC.PN
- Subjective


Encounter Start Date: 01/08/19


Encounter Start Time: 10:22


Sitting up in bed.  Agustina.  Discusses his hobbies and living in [place].  

Denies CP, SOB.  Eating "ok."  No nausea or vomiting.  "I'm doing the best I 

can."  Understands that maintenence hemodialysis is planned.








- Objective


Resuscitation Status - Order Detail:





12/28/18 18:25


Resuscitation Status Routine 


   Resuscitation Status: FULL: Full Resuscitation








Vital Signs & Weight: 


 Vital Signs (12 hours)











  Temp Pulse Resp BP Pulse Ox


 


 01/08/19 08:24   67   


 


 01/08/19 08:00  97.5 F L  67  22 H  133/50 L  96


 


 01/08/19 07:07   67  16   95


 


 01/08/19 06:59   68  16   95


 


 01/08/19 04:00  97.5 F L  70  16  144/53 H  100


 


 01/08/19 00:45  97.5 F L  67  18  119/59 L  100








 Weight











Admit Weight                   316 lb


 


Weight                         316 lb 12.8 oz














I&O: 


 











 01/07/19 01/08/19 01/09/19





 06:59 06:59 06:59


 


Intake Total 480 510 


 


Output Total 100 200 


 


Balance 380 310 











Result Diagrams: 


 01/08/19 08:13





 01/08/19 08:13


Additional Labs: 


 Accuchecks











  01/08/19 01/07/19 01/07/19





  05:31 20:25 15:34


 


POC Glucose  174 H  222 H  181 H














  01/07/19





  11:49


 


POC Glucose  163 H














Phys Exam





- Physical Examination


Constitutional: NAD


Oriented to person, place, not to time


HEENT: PERRLA, moist MMs


Neck: supple, full ROM


Respiratory: clear to auscultation bilateral


Cardiovascular: RRR


Gastrointestinal: soft, non-tender


Musculoskeletal: edema present


Neurological: non-focal, moves all 4 limbs


Deviation from normal: Not oriented to time or specifics of situation


Skin: no rash





Dx/Plan


(1) Anemia in chronic kidney disease (CKD)


Code(s): N18.9 - CHRONIC KIDNEY DISEASE, UNSPECIFIED; D63.1 - ANEMIA IN CHRONIC 

KIDNEY DISEASE   Status: Acute   Comment: Per Dr. Dye, transfuse one additional 

unit prbs today 1/8   





(2) Acute worsening of stage 3 chronic kidney disease


Code(s): N18.3 - CHRONIC KIDNEY DISEASE, STAGE 3 (MODERATE)   Status: Acute   

Comment: Referral for long term HD.    Fistula placed left AC.  Tunneled 

catheter also in place for current use.   





(3) Acute on chronic diastolic CHF (congestive heart failure)


Code(s): I50.33 - ACUTE ON CHRONIC DIASTOLIC (CONGESTIVE) HEART FAILURE   Status

: Acute   Comment: Generally improving   





(4) Morbid obesity with BMI of 40.0-44.9, adult


Code(s): E66.01 - MORBID (SEVERE) OBESITY DUE TO EXCESS CALORIES; Z68.41 - BODY 

MASS INDEX (BMI) 40.0-44.9, ADULT   Status: Chronic   





(5) GARCÍA (obstructive sleep apnea)


Code(s): G47.33 - OBSTRUCTIVE SLEEP APNEA (ADULT) (PEDIATRIC)   Status: Chronic

   Comment: CPAP qhs and with naps   





(6) Hyperkalemia


Code(s): E87.5 - HYPERKALEMIA   Status: Resolved   Comment: Stabilized   





(7) Ankylosing spondylitis


Code(s): M45.9 - ANKYLOSING SPONDYLITIS OF UNSPECIFIED SITES IN SPINE   Status: 

Chronic   





(8) DM type 2 (diabetes mellitus, type 2)


Status: Chronic   


Qualifiers: 


   Diabetes mellitus long term insulin use: with long term use   Diabetes 

mellitus complication status: with neurologic complications   Diabetes mellitus 

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 

diabetes mellitus with diabetic polyneuropathy; Z79.4 - Long term (current) use 

of insulin   


Comment: reasonably controlled   





(9) HTN (hypertension)


Code(s): I10 - ESSENTIAL (PRIMARY) HYPERTENSION   Status: Chronic   


Qualifiers: 


   Hypertension type: essential hypertension   Qualified Code(s): I10 - 

Essential (primary) hypertension   


Comment: continue PRN IV hydralazine   





(10) Paroxysmal atrial fibrillation


Code(s): I48.0 - PAROXYSMAL ATRIAL FIBRILLATION   Status: Chronic   





- Plan


cont current plan of care, incentive spirometry





* Wheelchair bound at home, uses lu lift.  Chronically deconditioned.


* Awaiting HD chair placement


* Morning labs ordered.


* Resume eliquis (ok do resume per surgery), will place on 2.5 mg po bid dosing 

due to age and renal dysfunction

## 2019-01-08 NOTE — PRG
DATE OF SERVICE:  01/08/2019



SUBJECTIVE:  Mr. Snider is an 83-year-old white male with acute kidney injury on

top of his chronic renal failure.  There is no evidence of renal recovery, will be

placed on indefinite maintenance hemodialysis.  In the interim, he received 1 unit

of packed RBC yesterday.  Further more, he had an AV fistula placed on left upper

extremity.  I saw him this morning.  He is quite confused.  He denies any complaints

of chest pain or shortness of breath. 



OBJECTIVE:  VITAL SIGNS:  Blood pressure 133/50, heart rate 67, respiratory rate 22,

temperature 97.5, and pulse ox 96%. 

GENERAL:  Noted to be awake, not in distress.  Obese. 

SKIN:  Adequate turgor. 

HEENT:  Slightly pale conjunctivae.  Anicteric sclerae. 

NECK:  No neck mass.  No carotid bruits.  No JVD. 

CHEST:  No deformities. 

LUNGS:  Clear breath sounds.  No wheezing.  No crackles. 

HEART:  Normal sinus rhythm.  No murmurs, gallops, or rubs. 

ABDOMEN:  Globular, soft, and nontender.  No masses. 

EXTREMITIES:  No edema.  No deformities.



MEDICATIONS:  Medications of January 8, 2019 was reviewed.



LABORATORY DATA:  Of January 8, 2019; white count 6.9, hemoglobin 8, sodium 137,

potassium 4.5, chloride 100, carbon dioxide 23, BUN 70, creatinine 5.98, glucose

139, and calcium 8.4. 



ASSESSMENT AND PLAN:  

1. Acute kidney injury/chronic renal failure-supportive dialysis.  Continue 3 times

a 

week hemodialysis regimen.  Awaiting outpatient dialysis placement.

2. Tolerating current hemodialysis regimen.  Fluid removal only as tolerated.

3. Anemia, p.r.n. blood transfusion.  The plan is to give him 1 more unit packed RBC

with dialysis.  Continue weekly Epogen. 

4. Congestive heart failure/hyperkalemia, resolved.





Job ID:  [id number]

## 2019-01-09 LAB
ANION GAP SERPL CALC-SCNC: 16 MMOL/L (ref 10–20)
BASOPHILS # BLD AUTO: 0 THOU/UL (ref 0–0.2)
BASOPHILS NFR BLD AUTO: 0.4 % (ref 0–1)
BUN SERPL-MCNC: 40 MG/DL (ref 8.4–25.7)
CALCIUM SERPL-MCNC: 8.4 MG/DL (ref 7.8–10.44)
CHLORIDE SERPL-SCNC: 101 MMOL/L (ref 98–107)
CO2 SERPL-SCNC: 25 MMOL/L (ref 23–31)
CREAT CL PREDICTED SERPL C-G-VRATE: 29 ML/MIN (ref 70–130)
EOSINOPHIL # BLD AUTO: 0.7 THOU/UL (ref 0–0.7)
EOSINOPHIL NFR BLD AUTO: 10.5 % (ref 0–10)
GLUCOSE SERPL-MCNC: 213 MG/DL (ref 83–110)
HGB BLD-MCNC: 9 G/DL (ref 14–18)
LYMPHOCYTES # BLD: 1.3 THOU/UL (ref 1.2–3.4)
LYMPHOCYTES NFR BLD AUTO: 18.5 % (ref 21–51)
MCH RBC QN AUTO: 30.8 PG (ref 27–31)
MCV RBC AUTO: 93.3 FL (ref 78–98)
MONOCYTES # BLD AUTO: 1 THOU/UL (ref 0.11–0.59)
MONOCYTES NFR BLD AUTO: 14.6 % (ref 0–10)
NEUTROPHILS # BLD AUTO: 3.8 THOU/UL (ref 1.4–6.5)
NEUTROPHILS NFR BLD AUTO: 56 % (ref 42–75)
PLATELET # BLD AUTO: 154 THOU/UL (ref 130–400)
POTASSIUM SERPL-SCNC: 4 MMOL/L (ref 3.5–5.1)
RBC # BLD AUTO: 2.93 MILL/UL (ref 4.7–6.1)
SODIUM SERPL-SCNC: 138 MMOL/L (ref 136–145)
WBC # BLD AUTO: 6.8 THOU/UL (ref 4.8–10.8)

## 2019-01-09 RX ADMIN — Medication SCH ML: at 20:34

## 2019-01-09 RX ADMIN — Medication SCH MG: at 07:49

## 2019-01-09 RX ADMIN — Medication SCH TAB: at 07:49

## 2019-01-09 RX ADMIN — INSULIN GLARGINE SCH MLS: 100 INJECTION, SOLUTION SUBCUTANEOUS at 07:48

## 2019-01-09 RX ADMIN — MOMETASONE FUROATE AND FORMOTEROL FUMARATE DIHYDRATE SCH: 100; 5 AEROSOL RESPIRATORY (INHALATION) at 11:18

## 2019-01-09 RX ADMIN — MOMETASONE FUROATE AND FORMOTEROL FUMARATE DIHYDRATE SCH PUFF: 100; 5 AEROSOL RESPIRATORY (INHALATION) at 19:50

## 2019-01-09 RX ADMIN — Medication SCH: at 09:46

## 2019-01-09 RX ADMIN — Medication SCH MG: at 20:32

## 2019-01-09 RX ADMIN — Medication SCH ML: at 07:51

## 2019-01-09 RX ADMIN — Medication SCH TAB: at 20:32

## 2019-01-09 NOTE — PDOC.PN
- Subjective


Encounter Start Date: 01/09/19


Encounter Start Time: 17:00


Subjective: feels better. able to eat but doesnot like food.


-: not able to walk as he is chronically bed bound





- Objective


Resuscitation Status - Order Detail:





12/28/18 18:25


Resuscitation Status Routine 


   Resuscitation Status: FULL: Full Resuscitation








MAR Reviewed: Yes


Vital Signs & Weight: 


 Vital Signs (12 hours)











  Temp Pulse Resp BP Pulse Ox


 


 01/09/19 14:33   86   


 


 01/09/19 08:00  98.0 F  86  20  158/75 H  93 L


 


 01/09/19 07:51   65   


 


 01/09/19 05:13  98.0 F  65  18  136/74  93 L








 Weight











Admit Weight                   316 lb


 


Weight                         316 lb 12.8 oz














I&O: 


 











 01/08/19 01/09/19 01/10/19





 06:59 06:59 06:59


 


Intake Total 510 250 480


 


Output Total 200 100 


 


Balance 310 150 480











Result Diagrams: 


 01/09/19 06:00





 01/09/19 06:00


Additional Labs: 


 Accuchecks











  01/09/19 01/09/19 01/09/19





  16:26 10:56 06:23


 


POC Glucose  200 H  168 H  200 H














  01/08/19





  21:11


 


POC Glucose  117 H








 Laboratory Tests











  01/03/19 01/03/19 01/04/19





  06:26 06:26 03:30


 


Hgb   8.5 L 


 


Creatinine  4.17 H   5.19 H














  01/04/19 01/07/19 01/07/19





  03:30 05:04 05:04


 


Hgb  7.8 L   6.6 L


 


Creatinine   5.03 H 














  01/08/19 01/08/19 01/09/19





  08:13 08:13 06:00


 


Hgb   8.0 L 


 


Creatinine  5.98 H   4.03 H














  01/09/19





  06:00


 


Hgb  9.0 L


 


Creatinine 














Phys Exam





- Physical Examination


Constitutional: NAD


pale,chatty but seems confused and rambling


HEENT: PERRLA, moist MMs, sclera anicteric, oral pharynx no lesions


Respiratory: no wheezing, no rales, no rhonchi, clear to auscultation bilateral


Cardiovascular: RRR, no significant murmur


R sided tunnled Subclavian


Gastrointestinal: soft, non-tender, no distention, positive bowel sounds


Musculoskeletal: no edema, pulses present


Left arm fistula healing


Neurological: non-focal, normal sensation, moves all 4 limbs





Dx/Plan


(1) Acute worsening of stage 3 chronic kidney disease


Code(s): N18.3 - CHRONIC KIDNEY DISEASE, STAGE 3 (MODERATE)   Status: Acute   

Comment: Referral for long term HD.    Fistula placed left AC.  Tunneled 

catheter also in place for current use.   





(2) Acute on chronic diastolic CHF (congestive heart failure)


Code(s): I50.33 - ACUTE ON CHRONIC DIASTOLIC (CONGESTIVE) HEART FAILURE   Status

: Acute   Comment: Generally improving   





(3) Anemia in chronic kidney disease (CKD)


Code(s): N18.9 - CHRONIC KIDNEY DISEASE, UNSPECIFIED; D63.1 - ANEMIA IN CHRONIC 

KIDNEY DISEASE   Status: Acute   Comment:  transfusd one additional unit prbs  1 /8   





(4) Weakness generalized


Code(s): R53.1 - WEAKNESS   Status: Acute   





(5) Ankylosing spondylitis


Code(s): M45.9 - ANKYLOSING SPONDYLITIS OF UNSPECIFIED SITES IN SPINE   Status: 

Chronic   





(6) CAD (coronary artery disease)


Code(s): I25.10 - ATHSCL HEART DISEASE OF NATIVE CORONARY ARTERY W/O ANG PCTRS 

  Status: Chronic   


Qualifiers: 


   Coronary Disease-Associated Artery/Lesion type: native artery   Native vs. 

transplanted heart: native heart   Associated angina: without angina   

Qualified Code(s): I25.10 - Atherosclerotic heart disease of native coronary 

artery without angina pectoris   





(7) Chronic a-fib


Code(s): I48.2 - CHRONIC ATRIAL FIBRILLATION   Status: Chronic   





(8) DM type 2 (diabetes mellitus, type 2)


Status: Chronic   


Qualifiers: 


   Diabetes mellitus long term insulin use: with long term use   Diabetes 

mellitus complication status: with neurologic complications   Diabetes mellitus 

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 

diabetes mellitus with diabetic polyneuropathy; Z79.4 - Long term (current) use 

of insulin   


Comment: reasonably controlled   





(9) GERD (gastroesophageal reflux disease)


Code(s): K21.9 - GASTRO-ESOPHAGEAL REFLUX DISEASE WITHOUT ESOPHAGITIS   Status: 

Chronic   


Qualifiers: 


   Esophagitis presence: esophagitis presence not specified   Qualified Code(s)

: K21.9 - Gastro-esophageal reflux disease without esophagitis   


Comment: stable   





(10) HTN (hypertension)


Code(s): I10 - ESSENTIAL (PRIMARY) HYPERTENSION   Status: Chronic   


Qualifiers: 


   Hypertension type: essential hypertension   Qualified Code(s): I10 - 

Essential (primary) hypertension   


Comment: continue PRN IV hydralazine   





(11) Morbid obesity with BMI of 40.0-44.9, adult


Code(s): E66.01 - MORBID (SEVERE) OBESITY DUE TO EXCESS CALORIES; Z68.41 - BODY 

MASS INDEX (BMI) 40.0-44.9, ADULT   Status: Chronic   





(12) GARCÍA (obstructive sleep apnea)


Code(s): G47.33 - OBSTRUCTIVE SLEEP APNEA (ADULT) (PEDIATRIC)   Status: Chronic

   Comment: CPAP qhs and with naps   





(13) Paroxysmal atrial fibrillation


Code(s): I48.0 - PAROXYSMAL ATRIAL FIBRILLATION   Status: Chronic   





- Plan


PT/OT, respiratory therapy, incentive spirometry, DVT proph w/SCDs


awaiting OP HD set up,O/W stable for DC clinically


-: cont inpt HD as long as remains in house


-: daily labs to monitor renal Fx


-: Hemodynamically stable.home meds as below


-: eliquis estarted at low dose yesterday.monitor H/H.cont multaq





* .








Review of Systems





- Review of Systems


Constitutional: weakness, malaise.  negative: fever, chills, sweats, other


ENT: negative: Ear Pain, Ear Discharge, Nose Pain, Nose Discharge, Nose 

Congestion, Mouth Pain, Mouth Swelling, Throat Pain, Throat Swelling, Other


Respiratory: negative: Cough, Dry, Shortness of Breath, Hemoptysis, SOB with 

Excertion, Pleuritic Pain, Sputum, Wheezing


Cardiovascular: negative: chest pain, palpitations, orthopnea, paroxysmal 

nocturnal dyspnea, edema, light headedness, other


Gastrointestinal: negative: Nausea, Vomiting, Abdominal Pain, Diarrhea, 

Constipation, Melena, Hematochezia, Other


Genitourinary: negative: Dysuria, Frequency, Incontinence, Hematuria, Retention

, Other


Neurological: negative: Weakness, Numbness, Incoordination, Change in Speech, 

Confusion, Seizures, Other





- Medications/Allergies


Allergies/Adverse Reactions: 


 Allergies











Allergy/AdvReac Type Severity Reaction Status Date / Time


 


diazepam [From Valium] Allergy   Verified 08/22/18 04:35


 


Iodinated Contrast- Oral and Allergy   Verified 08/22/18 04:35





IV Dye     





[Iodinated Contrast Media -     





IV Dye]     


 


nitrofurantoin Allergy   Verified 08/22/18 04:35





[From Macrobid]     


 


sodium hypochlorite solution Allergy   Verified 08/22/18 04:35





[sodium hypochlorite]     


 


Sulfa (Sulfonamide Allergy   Verified 08/22/18 04:35





Antibiotics)     


 


sulfamethoxazole Allergy   Verified 08/22/18 04:35





[From Bactrim]     


 


trimethoprim [From Bactrim] Allergy   Verified 08/22/18 04:35


 


sodium hyperchlorite Allergy Unknown  Uncoded 08/22/18 04:35











Medications: 


 Current Medications





Acetaminophen (Tylenol)  650 mg PO Q4H PRN


   PRN Reason: Headache/Fever/Mild Pain (1-3)


   Last Admin: 01/01/19 13:58 Dose:  650 mg


Acetaminophen (Tylenol)  1,000 mg PO Q6H PRN


   PRN Reason: Mild Pain (1-3)


Albuterol/Ipratropium (Duoneb)  3 ml NEB [id number] PRN


   PRN Reason: SOB &/or Wheezing


   Last Admin: 01/06/19 07:19 Dose:  3 ml


Apixaban (Eliquis)  2.5 mg PO BID [facility]


   Last Admin: 01/09/19 07:50 Dose:  2.5 mg


Ascorbic Acid (Vitamin C)  1,000 mg PO BID [facility]


   Last Admin: 01/09/19 07:49 Dose:  1,000 mg


Atorvastatin Calcium (Lipitor)  80 mg PO HS [facility]


   Last Admin: 01/08/19 21:10 Dose:  80 mg


Budesonide (Pulmicort Neb Solution)  0.5 mg INH BID-RT [facility]


   Last Admin: 01/09/19 11:18 Dose:  Not Given


Calcium/Vitamin D (Caltrate 600 + Vit D)  1 tab PO BID [facility]


   Last Admin: 01/09/19 07:49 Dose:  1 tab


Cyanocobalamin (Vitamin B-12)  1,000 mcg IM [id number] [facility]


Dextrose/Water (Dextrose 50%)  25 gm SLOW IVP PRN PRN


   PRN Reason: Hypoglycemia


Dronedarone (Multaq)  400 mg PO BID [facility]


   Last Admin: 01/09/19 07:50 Dose:  400 mg


Epoetin [place] (Procrit)  7,500 units SC Q7D [facility]


   Last Admin: 01/08/19 17:34 Dose:  7,500 units


Ferrous Sulfate (Feosol)  325 mg PO BID-WM [facility]


   Last Admin: 01/09/19 16:38 Dose:  325 mg


Fish Oil (Fish Oil)  1,000 mg PO DAILY [facility]


   Last Admin: 01/09/19 07:49 Dose:  1,000 mg


Gabapentin (Neurontin)  900 mg PO BID [facility]


   Last Admin: 01/09/19 07:50 Dose:  900 mg


Glipizide (Glucotrol)  10 mg PO DAILY-AC [facility]


   Last Admin: 01/09/19 07:51 Dose:  10 mg


Glucagon (Glucagon)  1 mg IM PRN PRN


   PRN Reason: Hypoglycemia


Heparin Sodium (Porcine) (Heparin Lock Flush 100 Units/Ml)  500 units IVF PRN 

PRN


   PRN Reason: Heparin Flush


Hydralazine HCl (Apresoline)  37.5 mg PO TID [facility]


   Last Admin: 01/09/19 14:33 Dose:  37.5 mg


Hydralazine HCl (Apresoline)  10 mg SLOW IVP Q6H PRN


   PRN Reason: SBP Greater Than 170


Dextrose/Water (D5w)  1,000 mls @ 0 mls/hr IV .Q0M PRN


   PRN Reason: Hypoglycemia


Insulin Glargine 10 units/ (Miscellaneous Medication)  0.1 mls @ 0 mls/hr SC 

QAM [facility]


   Last Admin: 01/09/19 07:48 Dose:  0.1 mls


Insulin Human Lispro (Humalog)  0 units SC .MODERATE SLIDING SC PRN


   PRN Reason: Moderate Correctional Scale


   Last Admin: 01/03/19 17:36 Dose:  4 unit


Isosorbide Dinitrate (Isordil)  20 mg PO TID [facility]


   Last Admin: 01/09/19 14:34 Dose:  20 mg


Mometasone Furoate/Formoterol Fumar (Dulera 100 Mcg/5 Mcg Inhaler)  2 puff INH 

BID-RT [facility]


   Last Admin: 01/09/19 11:18 Dose:  Not Given


Multivitamins/Zinc (Stress 600 With Zinc)  1 tab PO DAILY [facility]


   Last Admin: 01/09/19 07:49 Dose:  1 tab


Pantoprazole Sodium (Protonix)  40 mg PO HS [facility]


   Last Admin: 01/08/19 21:11 Dose:  40 mg


Sodium Chloride (Flush - Normal Saline)  10 ml IVF Q12HR [facility]


   Last Admin: 01/09/19 07:51 Dose:  10 ml


Sodium Chloride (Flush - Normal Saline)  10 ml IVF PRN PRN


   PRN Reason: Saline Flush


Terazosin HCl (Hytrin)  5 mg PO DAILY [facility]


   Last Admin: 01/09/19 07:51 Dose:  5 mg


Tramadol HCl (Ultram)  50 mg PO Q6H PRN


   PRN Reason: Moderate Pain (4-6)


Tramadol HCl (Ultram)  100 mg PO Q6H PRN


   PRN Reason: Severe Pain (7-10)


Vitamin A (Vitamin A)  10,000 units PO DAILY [facility]


   Last Admin: 01/09/19 09:46 Dose:  Not Given


Zinc Sulfate (Zinc Sulfate)  220 mg PO DAILY [facility]


   Last Admin: 01/09/19 07:51 Dose:  220 mg

## 2019-01-09 NOTE — PRG
DATE OF SERVICE:  01/09/2019



RENAL MEDICINE



SUBJECTIVE:  Mr. Snider is an 83-year-old white male with acute kidney injury on

top of his chronic renal failure.  Hemodialysis was initiated due to the

hyperkalemia and volume overload.  He is tolerating his dialysis.  This morning, he

is feeling better.  He denies any chest pain or shortness of breath. 



OBJECTIVE:  VITAL SIGNS:  Blood pressure is 158/75, heart rate 86, respiratory rate

20, temperature 98, and pulse ox 93%. 

GENERAL:  Noted to be awake, alert, obese, not in distress. 

SKIN:  Adequate turgor. 

HEENT:  Slightly pale conjunctivae.  Anicteric sclerae.  No neck mass.  No carotid

bruits.  No JVD. 

CHEST:  No deformities. 

LUNGS:  Decreased breath sounds. 

HEART:  Normal sinus rhythm.  No murmur.  No gallops.  No rubs. 

ABDOMEN:  Globular, soft, nontender.  No masses. 

EXTREMITIES:  Trace edema.  Decreased motor strength in lower extremities.



MEDICATIONS:  Medications of January 9, 2019 was reviewed.



LABORATORY DATA:  Laboratories of January 9, 2019; white count 6.8, hemoglobin 9.

Sodium 138, potassium 4, chloride 101, carbon dioxide 25, BUN 40, creatinine 4.03,

glucose 213, calcium 2.1. 



ASSESSMENT AND PLAN:  

1. Acute kidney injury/chronic renal failure.  No evidence of renal recovery.

Continue three times a week hemodialysis.  Fluid removal only as tolerated.

Awaiting outpatient dialysis placement. 

2. Anemia, continuing weekly Epogen.

3. Congestive heart failure/hyperkalemia, resolved.  Agree with current management.





Job ID:  [id number]

## 2019-01-10 PROCEDURE — 5A1D70Z PERFORMANCE OF URINARY FILTRATION, INTERMITTENT, LESS THAN 6 HOURS PER DAY: ICD-10-PCS | Performed by: INTERNAL MEDICINE

## 2019-01-10 RX ADMIN — INSULIN LISPRO PRN UNIT: 100 INJECTION, SOLUTION INTRAVENOUS; SUBCUTANEOUS at 20:48

## 2019-01-10 RX ADMIN — Medication SCH TAB: at 20:42

## 2019-01-10 RX ADMIN — Medication SCH ML: at 13:32

## 2019-01-10 RX ADMIN — Medication SCH MG: at 13:24

## 2019-01-10 RX ADMIN — Medication SCH TAB: at 13:26

## 2019-01-10 RX ADMIN — MOMETASONE FUROATE AND FORMOTEROL FUMARATE DIHYDRATE SCH PUFF: 100; 5 AEROSOL RESPIRATORY (INHALATION) at 06:51

## 2019-01-10 RX ADMIN — Medication SCH ML: at 20:44

## 2019-01-10 RX ADMIN — INSULIN LISPRO PRN UNIT: 100 INJECTION, SOLUTION INTRAVENOUS; SUBCUTANEOUS at 17:07

## 2019-01-10 RX ADMIN — Medication SCH MG: at 20:42

## 2019-01-10 RX ADMIN — INSULIN GLARGINE SCH MLS: 100 INJECTION, SOLUTION SUBCUTANEOUS at 13:40

## 2019-01-10 RX ADMIN — Medication SCH TAB: at 13:25

## 2019-01-10 RX ADMIN — Medication SCH UNITS: at 13:27

## 2019-01-10 RX ADMIN — MOMETASONE FUROATE AND FORMOTEROL FUMARATE DIHYDRATE SCH: 100; 5 AEROSOL RESPIRATORY (INHALATION) at 19:53

## 2019-01-10 NOTE — PRG
DATE OF SERVICE:  01/10/2019



RENAL MEDICINE



SUBJECTIVE:  Mr. Snider is an 83-year-old white male, who was admitted for

hyperkalemia and volume overload.  He was initiated on dialysis.  There is no

evidence of renal recovery.  Currently, on dialysis.  I am at the bedside

supervising his dialysis.  The patient voices that he is not ready to go home, that

no one will be able to care for him. 



No complaints of chest pain or shortness of breath.



OBJECTIVE:  VITAL SIGNS:  Blood pressure is 149/63, heart rate 73, respiratory rate

18, temperature 98, and pulse ox 92%. 

GENERAL EXAM:  Noted to be awake, alert, supine, comfortable, obese, not in

distress. 

SKIN:  Adequate turgor. 

HEENT:  He has a slightly pale conjunctivae.  Anicteric sclerae. 

NECK:  No neck mass.  No carotid bruits.  No JVD. 

CHEST:  No deformities. 

LUNGS:  Clear breath sounds.  No wheezing.  No crackles. 

HEART:  Normal sinus rhythm.  No murmurs, gallops, or rubs. 

ABDOMEN:  Globular, soft, nontender.  No masses. 

EXTREMITIES:  Trace edema.



MEDICATIONS:  Medications of January 10, 2019, was reviewed.



LABORATORY DATA:  Laboratories of January 9, 2019, white count 6.8, hemoglobin 9.

Sodium 138, potassium 4, chloride 101 carbon dioxide 25, BUN 40, creatinine 4.03,

glucose 213, calcium 8.4. 



ASSESSMENT AND PLAN:  

1. Acute kidney injury/chronic renal failure - no evidence of renal recovery.

Continuing three times a week hemodialysis.  Fluid removal only as tolerated. 

2. Anemia.  Continuing weekly Epogen, stabilizing. 



The patient feels uncomfortable going home since he will not be able to care for

himself and his wife is somewhat limited in taking care of him.  Consider nursing

home placement for this patient.  I discussed the case with the nurse and she will

get in touch with the . 







Job ID:  [id number]

## 2019-01-10 NOTE — RAD
RADIOGRAPH LEFT KNEE 4 VIEWS:

1/10/19 at 3:30 p.m.

 

HISTORY: 

83-year-old male with traumatic left knee pain due to fall. 

 

FINDINGS:  

Fine osseous detail is partially obscured by the combination of osteopenia and overlying clothing or 
dressings on the lateral view. The lateral view demonstrates what may be a mildly displaced fracture 
of the patella, involving upper pole, extending to mid pole region. No displaced fracture of the dist
al femur, proximal tibia, or proximal fibula is identified. The joint spaces are maintained without e
rosions or significant osteophytes. There is chondrocalcinosis at the medial and lateral compartments
. No moderate sized or large joint effusion is identified. Also as demonstrated on the lateral view, 
the patella is located slightly inferior to its expected location.

 

IMPRESSION:  

1.      Suspected mildly displaced acute, traumatic fracture of the patella. Recommend clinical corre
lation (is the patella the site of maximum point tenderness?).

2.      Questionable patella baja. 

3.      Diffuse osteopenia.

4.      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

 

POS: [facility]

## 2019-01-10 NOTE — PDOC.PN
- Subjective


Encounter Start Date: 01/10/19


Subjective: seen in HD/


-: c/o left knee pain-bumped in table few days ago





- Objective


Resuscitation Status - Order Detail:





12/28/18 18:25


Resuscitation Status Routine 


   Resuscitation Status: FULL: Full Resuscitation








MAR Reviewed: Yes


Vital Signs & Weight: 


 Vital Signs (12 hours)











  Temp Pulse Resp BP BP BP Pulse Ox


 


 01/10/19 15:11   68   161/67 H   


 


 01/10/19 15:10      161/67 H  


 


 01/10/19 13:49        94 L


 


 01/10/19 13:25   68   185/67 H   


 


 01/10/19 07:43  98.0 F  73  18    149/63 H  92 L


 


 01/10/19 06:49   75  16     94 L








 Weight











Admit Weight                   316 lb


 


Weight                         316 lb 12.8 oz














I&O: 


 











 01/09/19 01/10/19 01/11/19





 06:59 06:59 06:59


 


Intake Total 250 930 


 


Output Total 100 75 


 


Balance 150 855 











Result Diagrams: 


 01/09/19 06:00





 01/09/19 06:00


Additional Labs: 


 Accuchecks











  01/10/19 01/10/19 01/09/19





  13:41 04:58 20:40


 


POC Glucose  113 H  197 H  175 H














  01/09/19





  16:26


 


POC Glucose  200 H














Phys Exam





- Physical Examination


Constitutional: NAD


HEENT: PERRLA, moist MMs, sclera anicteric, oral pharynx no lesions


Neck: no nodes, no JVD, supple, full ROM


Respiratory: no wheezing, no rales, no rhonchi, clear to auscultation bilateral


Cardiovascular: RRR, no significant murmur, no rub


Gastrointestinal: soft, non-tender, no distention, positive bowel sounds


Musculoskeletal: no edema, pulses present


Neurological: non-focal, normal sensation, moves all 4 limbs


Psychiatric: normal affect, A&O x 3


Skin: no rash





Dx/Plan


(1) Acute worsening of stage 3 chronic kidney disease


Code(s): N18.3 - CHRONIC KIDNEY DISEASE, STAGE 3 (MODERATE)   Status: Acute   

Comment: Referral for long term HD.    Fistula placed left AC.  Tunneled 

catheter also in place for current use.   





(2) Acute on chronic diastolic CHF (congestive heart failure)


Code(s): I50.33 - ACUTE ON CHRONIC DIASTOLIC (CONGESTIVE) HEART FAILURE   Status

: Acute   Comment: Generally improving   





(3) Anemia in chronic kidney disease (CKD)


Code(s): N18.9 - CHRONIC KIDNEY DISEASE, UNSPECIFIED; D63.1 - ANEMIA IN CHRONIC 

KIDNEY DISEASE   Status: Acute   Comment:  transfusd one additional unit prbs  1 /8   





(4) Weakness generalized


Code(s): R53.1 - WEAKNESS   Status: Acute   





(5) Ankylosing spondylitis


Code(s): M45.9 - ANKYLOSING SPONDYLITIS OF UNSPECIFIED SITES IN SPINE   Status: 

Chronic   





(6) CAD (coronary artery disease)


Code(s): I25.10 - ATHSCL HEART DISEASE OF NATIVE CORONARY ARTERY W/O ANG PCTRS 

  Status: Chronic   


Qualifiers: 


   Coronary Disease-Associated Artery/Lesion type: native artery   Native vs. 

transplanted heart: native heart   Associated angina: without angina   

Qualified Code(s): I25.10 - Atherosclerotic heart disease of native coronary 

artery without angina pectoris   





(7) Chronic a-fib


Code(s): I48.2 - CHRONIC ATRIAL FIBRILLATION   Status: Chronic   





(8) DM type 2 (diabetes mellitus, type 2)


Status: Chronic   


Qualifiers: 


   Diabetes mellitus long term insulin use: with long term use   Diabetes 

mellitus complication status: with neurologic complications   Diabetes mellitus 

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 

diabetes mellitus with diabetic polyneuropathy; Z79.4 - Long term (current) use 

of insulin   


Comment: reasonably controlled   





(9) GERD (gastroesophageal reflux disease)


Code(s): K21.9 - GASTRO-ESOPHAGEAL REFLUX DISEASE WITHOUT ESOPHAGITIS   Status: 

Chronic   


Qualifiers: 


   Esophagitis presence: esophagitis presence not specified   Qualified Code(s)

: K21.9 - Gastro-esophageal reflux disease without esophagitis   


Comment: stable   





(10) HTN (hypertension)


Code(s): I10 - ESSENTIAL (PRIMARY) HYPERTENSION   Status: Chronic   


Qualifiers: 


   Hypertension type: essential hypertension   Qualified Code(s): I10 - 

Essential (primary) hypertension   


Comment: continue PRN IV hydralazine   





(11) Morbid obesity with BMI of 40.0-44.9, adult


Code(s): E66.01 - MORBID (SEVERE) OBESITY DUE TO EXCESS CALORIES; Z68.41 - BODY 

MASS INDEX (BMI) 40.0-44.9, ADULT   Status: Chronic   





(12) GARCÍA (obstructive sleep apnea)


Code(s): G47.33 - OBSTRUCTIVE SLEEP APNEA (ADULT) (PEDIATRIC)   Status: Chronic

   Comment: CPAP qhs and with naps   





(13) Paroxysmal atrial fibrillation


Code(s): I48.0 - PAROXYSMAL ATRIAL FIBRILLATION   Status: Chronic   





- Plan


respiratory therapy, incentive spirometry, DVT proph w/SCDs


left knee Xray.


-: SNIF eval.Pt reluctant


-: OP HD set up done.


-: am labs





* .








Review of Systems





- Review of Systems


Constitutional: weakness, malaise


Respiratory: negative: Cough, Dry, Shortness of Breath, Hemoptysis, SOB with 

Excertion, Pleuritic Pain, Sputum, Wheezing


Cardiovascular: negative: chest pain, palpitations, orthopnea, paroxysmal 

nocturnal dyspnea, edema, light headedness, other


Gastrointestinal: negative: Nausea, Vomiting, Abdominal Pain, Diarrhea, 

Constipation, Melena, Hematochezia, Other


Genitourinary: negative: Dysuria, Frequency, Incontinence, Hematuria, Retention

, Other


Musculoskeletal: negative: Neck Pain, Shoulder Pain, Arm Pain, Back Pain, Hand 

Pain, Leg Pain, Foot Pain, Other


Neurological: negative: Weakness, Numbness, Incoordination, Change in Speech, 

Confusion, Seizures, Other





- Medications/Allergies


Allergies/Adverse Reactions: 


 Allergies











Allergy/AdvReac Type Severity Reaction Status Date / Time


 


diazepam [From Valium] Allergy   Verified 08/22/18 04:35


 


Iodinated Contrast- Oral and Allergy   Verified 08/22/18 04:35





IV Dye     





[Iodinated Contrast Media -     





IV Dye]     


 


nitrofurantoin Allergy   Verified 08/22/18 04:35





[From Macrobid]     


 


sodium hypochlorite solution Allergy   Verified 08/22/18 04:35





[sodium hypochlorite]     


 


Sulfa (Sulfonamide Allergy   Verified 08/22/18 04:35





Antibiotics)     


 


sulfamethoxazole Allergy   Verified 08/22/18 04:35





[From Bactrim]     


 


trimethoprim [From Bactrim] Allergy   Verified 08/22/18 04:35


 


sodium hyperchlorite Allergy Unknown  Uncoded 08/22/18 04:35











Medications: 


 Current Medications





Acetaminophen (Tylenol)  650 mg PO Q4H PRN


   PRN Reason: Headache/Fever/Mild Pain (1-3)


   Last Admin: 01/01/19 13:58 Dose:  650 mg


Acetaminophen (Tylenol)  1,000 mg PO Q6H PRN


   PRN Reason: Mild Pain (1-3)


Albuterol/Ipratropium (Duoneb)  3 ml NEB [id number] PRN


   PRN Reason: SOB &/or Wheezing


   Last Admin: 01/06/19 07:19 Dose:  3 ml


Apixaban (Eliquis)  2.5 mg PO BID [facility]


   Last Admin: 01/10/19 13:28 Dose:  2.5 mg


Ascorbic Acid (Vitamin C)  1,000 mg PO BID [facility]


   Last Admin: 01/10/19 13:24 Dose:  1,000 mg


Atorvastatin Calcium (Lipitor)  80 mg PO HS [facility]


   Last Admin: 01/09/19 20:32 Dose:  80 mg


Budesonide (Pulmicort Neb Solution)  0.5 mg INH BID-RT [facility]


   Last Admin: 01/10/19 06:49 Dose:  0.5 mg


Calcium/Vitamin D (Caltrate 600 + Vit D)  1 tab PO BID [facility]


   Last Admin: 01/10/19 13:25 Dose:  1 tab


Cyanocobalamin (Vitamin B-12)  1,000 mcg IM [id number] [facility]


Dextrose/Water (Dextrose 50%)  25 gm SLOW IVP PRN PRN


   PRN Reason: Hypoglycemia


Dronedarone (Multaq)  400 mg PO BID [facility]


   Last Admin: 01/10/19 13:25 Dose:  400 mg


Epoetin [place] (Procrit)  7,500 units SC Q7D [facility]


   Last Admin: 01/08/19 17:34 Dose:  7,500 units


Ferrous Sulfate (Feosol)  325 mg PO BID-WM [facility]


   Last Admin: 01/10/19 13:27 Dose:  325 mg


Fish Oil (Fish Oil)  1,000 mg PO DAILY [facility]


   Last Admin: 01/10/19 13:25 Dose:  1,000 mg


Gabapentin (Neurontin)  900 mg PO BID [facility]


   Last Admin: 01/10/19 13:37 Dose:  900 mg


Glipizide (Glucotrol)  10 mg PO DAILY-AC [facility]


   Last Admin: 01/10/19 08:00 Dose:  Not Given


Glucagon (Glucagon)  1 mg IM PRN PRN


   PRN Reason: Hypoglycemia


Heparin Sodium (Porcine) (Heparin Lock Flush 100 Units/Ml)  500 units IVF PRN 

PRN


   PRN Reason: Heparin Flush


Hydralazine HCl (Apresoline)  37.5 mg PO TID [facility]


   Last Admin: 01/10/19 15:11 Dose:  Not Given


Hydralazine HCl (Apresoline)  10 mg SLOW IVP Q6H PRN


   PRN Reason: SBP Greater Than 170


Dextrose/Water (D5w)  1,000 mls @ 0 mls/hr IV .Q0M PRN


   PRN Reason: Hypoglycemia


Insulin Glargine 10 units/ (Miscellaneous Medication)  0.1 mls @ 0 mls/hr SC 

QAM [facility]


   Last Admin: 01/10/19 13:40 Dose:  0.1 mls


Insulin Human Lispro (Humalog)  0 units SC .MODERATE SLIDING SC PRN


   PRN Reason: Moderate Correctional Scale


   Last Admin: 01/03/19 17:36 Dose:  4 unit


Isosorbide Dinitrate (Isordil)  20 mg PO TID [facility]


   Last Admin: 01/10/19 15:12 Dose:  20 mg


Mometasone Furoate/Formoterol Fumar (Dulera 100 Mcg/5 Mcg Inhaler)  2 puff INH 

BID-RT [facility]


   Last Admin: 01/10/19 06:51 Dose:  2 puff


Multivitamins/Zinc (Stress 600 With Zinc)  1 tab PO DAILY [facility]


   Last Admin: 01/10/19 13:26 Dose:  1 tab


Pantoprazole Sodium (Protonix)  40 mg PO HS [facility]


   Last Admin: 01/09/19 20:34 Dose:  40 mg


Sodium Chloride (Flush - Normal Saline)  10 ml IVF Q12HR [facility]


   Last Admin: 01/10/19 13:32 Dose:  10 ml


Sodium Chloride (Flush - Normal Saline)  10 ml IVF PRN PRN


   PRN Reason: Saline Flush


Terazosin HCl (Hytrin)  5 mg PO DAILY [facility]


   Last Admin: 01/10/19 13:25 Dose:  5 mg


Tramadol HCl (Ultram)  50 mg PO Q6H PRN


   PRN Reason: Moderate Pain (4-6)


Tramadol HCl (Ultram)  100 mg PO Q6H PRN


   PRN Reason: Severe Pain (7-10)


Vitamin A (Vitamin A)  10,000 units PO DAILY [facility]


   Last Admin: 01/10/19 13:27 Dose:  10,000 units


Zinc Sulfate (Zinc Sulfate)  220 mg PO DAILY [facility]


   Last Admin: 01/10/19 13:29 Dose:  220 mg

## 2019-01-11 VITALS — SYSTOLIC BLOOD PRESSURE: 145 MMHG | TEMPERATURE: 97.3 F | DIASTOLIC BLOOD PRESSURE: 67 MMHG

## 2019-01-11 RX ADMIN — INSULIN LISPRO PRN UNIT: 100 INJECTION, SOLUTION INTRAVENOUS; SUBCUTANEOUS at 05:53

## 2019-01-11 RX ADMIN — Medication SCH TAB: at 09:26

## 2019-01-11 RX ADMIN — Medication SCH TAB: at 09:24

## 2019-01-11 RX ADMIN — MOMETASONE FUROATE AND FORMOTEROL FUMARATE DIHYDRATE SCH PUFF: 100; 5 AEROSOL RESPIRATORY (INHALATION) at 06:34

## 2019-01-11 RX ADMIN — Medication SCH UNITS: at 09:27

## 2019-01-11 RX ADMIN — INSULIN LISPRO PRN UNIT: 100 INJECTION, SOLUTION INTRAVENOUS; SUBCUTANEOUS at 14:25

## 2019-01-11 RX ADMIN — Medication SCH ML: at 09:29

## 2019-01-11 RX ADMIN — INSULIN GLARGINE SCH MLS: 100 INJECTION, SOLUTION SUBCUTANEOUS at 09:23

## 2019-01-11 RX ADMIN — Medication SCH MG: at 09:26

## 2019-01-11 NOTE — HP
HISTORY OF PRESENT ILLNESS:  Mr. Snider is an 83-year-old white male with chronic

renal failure/acute kidney injury.  Currently, on maintenance hemodialysis.  He

shows no evidence of renal recovery.  He is going to be discharged today.  He

prefers to go home rather than going to the nursing home.  No new complaints.  No

chest pain or shortness of breath. 



OBJECTIVE:  VITAL SIGNS:  Blood pressure 126/56, heart rate 72, respiratory rate 16,

temperature 97.6, and pulse ox 94%. 

GENERAL:  Noted to be awake, alert, supine, morbidly obese. 

SKIN:  Adequate turgor. 

HEENT:  Slightly pale conjunctivae.  Anicteric sclerae.  No neck mass.  No carotid

bruits.  No JVD. 

CHEST:  No deformities. 

LUNGS:  Clear breath sounds. 

HEART:  Normal sinus rhythm.  No murmur.  No gallops.  No rubs. 

ABDOMEN:  Globular, soft, and nontender.  No masses. 

EXTREMITIES:  No edema.



MEDICATIONS:  Medications of January 11, 2019, was reviewed.



LABORATORY DATA:  Laboratories of January 9, 2019; white count 6.8, hemoglobin 9. 

Sodium 138, potassium 4, chloride 101, carbon dioxide 25, BUN 40, creatinine 4.03,

glucose 213, and calcium 8.4. 



ASSESSMENT AND PLAN:  

1. Acute kidney injury/chronic renal failure.  No evidence of renal recovery.  We

will observe for now for several days and if no evidence of renal recovery, we will

declare this patient as ESRD.  Continue current maintenance hemodialysis.  The

patient is to be discharged today.  We will follow him at outpatient dialysis unit. 

2. Anemia, continue weekly Epogen.  Adjust Epogen as needed.

3. Congestive heart failure, resolved.  Clinically asymptomatic. 



Overall agree with current management.





Job ID:  [id number]

## 2019-01-12 NOTE — DIS
DATE OF ADMISSION:  12/28/2018



DATE OF DISCHARGE:  01/11/2019



DISCHARGE DISPOSITION:  Home with home health.



DISCHARGE DIAGNOSES:  

1. Acute-on-chronic congestive heart failure, started on hemodialysis this admission.

2. Anemia of chronic kidney disease, on weekly Epogen.

3. Acute on chronic diastolic congestive heart failure.

4. Morbid obesity with a BMI of 43.

5. Generalized weakness.

6. Chronic ankylosing spondylitis, bed-bound status.

7. History of coronary artery disease.

8. Chronic atrial fibrillation.

9. Diabetes mellitus type 2.

10. Gastroesophageal reflux disease.

11. Hypertension.

12. Sleep apnea.

13. Paroxysmal atrial fibrillation.



DISCHARGE MEDICATIONS:  Resume home medications as follows;

1. Glipizide 10 mg daily.

2. Minocycline 100 mg p.o. b.i.d.

3. Fluoxetine 40 mg daily.

4. Losartan 50 mg daily. 

5. Iron 65 mg p.o. b.i.d.

6. Coreg 6.25 mg p.o. b.i.d.

7. Gabapentin 900 mg p.o. b.i.d.

8. Terazosin 5 mg daily.  

9. Ranitidine 300 mg daily.

10. Isosorbide dinitrate/hydralazine 1 tablet p.o. t.i.d.

11. Atorvastatin 80 mg daily.

12. Aspirin 81 mg daily. 

13. Eliquis 5 mg p.o. b.i.d. 

14. Please note that the patient has been taken off his Lasix and Zaroxolyn.



IN-HOUSE CONSULTATIONS:  

1. Nephrology, Dr. Dye. 

2. General Surgery, Dr. Finley.

3. Cardiology, Dr. Art.

4. Pulmonary Medicine, Dr. Jordan.



PROCEDURES DONE IN THE HOSPITAL:  

1. Placement of initially temporary then permanent dialysis access with left arm

fistula by Dr. Finley. 

2. Initiation and maintenance of hemodialysis.

3. Renal ultrasound and marking ultrasound.

4. Knee x-ray, which showed mildly displaced patellar fracture of the left knee.



HISTORY OF PRESENTING ILLNESS:  Mr. Snider is an 83-year-old male with past

medical history of chronic kidney disease and other comorbidities as outlined above,

who presented to the emergency room with complaints of fatigue.  He is paraplegic,

wheelchair and bed bound.  He was found to have a creatinine of 6.6, potassium of

6.5, and was hospitalized for acute on chronic renal insufficiency.  He was found to

be uremic and Nephrology was consulted for emergent dialysis.  He was also found to

be volume overloaded.  Please see admission history and physical dictated by Dr. Candis Guaman for further details. 



HOSPITAL COURSE:  Dr. Dye saw the patient and agreed on initiation of hemodialysis.

Dr. Finley was consulted and initially a temporary hemodialysis catheter was placed

and dialysis was started.  It was eventually changed to a permanent access in his

left arm as well as IJ tunneled catheter on the left side as it was clear that the

patient will need to be on long-term hemodialysis.  His outpatient dialysis was set

up for him with the help of the . 



He also saw Cardiology for acute volume overload, thought to be secondary to

worsening renal function. 



He was also seen by Dr. Jordan upon presentation as he was admitted to [facility].  He

had no specific new recommendation, but continuation of diuresis, hemodialysis

eventually, and continuation of his CPAP that he uses at night. 



By the time of discharge, the patient has been stabilized and is doing well.

Skilled nursing rehab was recommended for them by myself and by Dr. Dye, but they

refused.  I have had multiple long discussions with the patient and his wife, but at

this time, they want to take him home instead.  I have a feeling that they will not

be able to take care of him in this current situation as bed-bound as he is with

acute illness on top of that.  Home Health has been arranged for them; however, it

is unclear how they will manage transportation back and forth to hemodialysis.  Dr. Dye and  have been working to arrange transportation with the help of

[facility] for him.  Once again, they have refused offer to place him in a

skilled nursing rehab for few weeks. 



He was seen and examined prior to discharge.  All questions were answered.

Discharge plan was discussed with Ms. Gan, his wife, as well as with the patient

who verbalized understanding.  Please see hospitalist's progress note from the date

of discharge for further details. 



TIME SPENT:  Total time spent in the discharge of this patient, 38 minutes.







Job ID:  [id number]

## 2019-01-25 ENCOUNTER — HOSPITAL ENCOUNTER (OUTPATIENT)
Dept: HOSPITAL 92 - ONC/OP | Age: 84
LOS: 1 days | Discharge: HOME | End: 2019-01-26
Attending: INTERNAL MEDICINE
Payer: MEDICARE

## 2019-01-25 DIAGNOSIS — D64.9: Primary | ICD-10-CM

## 2019-01-25 PROCEDURE — 36430 TRANSFUSION BLD/BLD COMPNT: CPT

## 2019-01-25 PROCEDURE — 30283B1 TRANSFUSION OF NONAUTOLOGOUS 4-FACTOR PROTHROMBIN COMPLEX CONCENTRATE INTO VEIN, PERCUTANEOUS APPROACH: ICD-10-PCS | Performed by: INTERNAL MEDICINE

## 2019-01-25 PROCEDURE — 86870 RBC ANTIBODY IDENTIFICATION: CPT

## 2019-01-25 PROCEDURE — 86900 BLOOD TYPING SEROLOGIC ABO: CPT

## 2019-01-25 PROCEDURE — 86905 BLOOD TYPING RBC ANTIGENS: CPT

## 2019-01-25 PROCEDURE — 86850 RBC ANTIBODY SCREEN: CPT

## 2019-01-25 PROCEDURE — P9016 RBC LEUKOCYTES REDUCED: HCPCS

## 2019-01-25 PROCEDURE — 86922 COMPATIBILITY TEST ANTIGLOB: CPT

## 2019-01-25 PROCEDURE — 86901 BLOOD TYPING SEROLOGIC RH(D): CPT

## 2019-01-25 PROCEDURE — 36415 COLL VENOUS BLD VENIPUNCTURE: CPT

## 2019-01-26 VITALS — DIASTOLIC BLOOD PRESSURE: 70 MMHG | SYSTOLIC BLOOD PRESSURE: 164 MMHG | TEMPERATURE: 97.8 F

## 2019-02-18 ENCOUNTER — HOSPITAL ENCOUNTER (OUTPATIENT)
Dept: HOSPITAL 92 - WCC | Age: 84
Discharge: HOME | End: 2019-02-18
Attending: FAMILY MEDICINE
Payer: MEDICARE

## 2019-02-18 DIAGNOSIS — I10: ICD-10-CM

## 2019-02-18 DIAGNOSIS — Z86.79: ICD-10-CM

## 2019-02-18 DIAGNOSIS — K21.9: ICD-10-CM

## 2019-02-18 DIAGNOSIS — M45.9: ICD-10-CM

## 2019-02-18 DIAGNOSIS — G47.33: ICD-10-CM

## 2019-02-18 DIAGNOSIS — I25.10: ICD-10-CM

## 2019-02-18 DIAGNOSIS — N18.6: ICD-10-CM

## 2019-02-18 DIAGNOSIS — E11.621: Primary | ICD-10-CM

## 2019-02-18 DIAGNOSIS — E11.22: ICD-10-CM

## 2019-02-18 DIAGNOSIS — L97.519: ICD-10-CM

## 2019-02-18 DIAGNOSIS — L97.529: ICD-10-CM

## 2019-02-18 DIAGNOSIS — I89.0: ICD-10-CM

## 2019-02-18 PROCEDURE — 87070 CULTURE OTHR SPECIMN AEROBIC: CPT

## 2019-02-18 PROCEDURE — A4218 STERILE SALINE OR WATER: HCPCS

## 2019-02-18 PROCEDURE — 97602 WOUND(S) CARE NON-SELECTIVE: CPT

## 2019-02-18 PROCEDURE — 87205 SMEAR GRAM STAIN: CPT

## 2019-02-18 NOTE — PRG
DATE OF SERVICE:  02/18/2019



SUBJECTIVE:  Mr. Martín Snider is a very pleasant 83-year-old gentleman,

accompanied by his wife, who presents to the [facility] for evaluation of an

ulceration of the right lateral foot.  The patient's wife states that the ulceration

of the right lateral foot for which the patient had been seen in November 2018,

subsequently healed completely.  She states that the wound recurred in a slightly

different location.  The patient presents to the Wound Center today also with

multiple ulcerations of the left foot.  The patient is apparently taking p.o.

antibiotics, possibly minocycline as per Dr. Donaldson.  The patient continues to

receive dressing changes with the assistance of Home Health. 



OBJECTIVE:  VITAL SIGNS:  Temperature 98.1, pulse 66, respirations 18, and blood

pressure 92/53.  Accu-Chek 283. 

EXTREMITIES:  An ulceration of the right lateral foot is present, which measures

approximately 3.0 x 1.4 cm.  Granulation tissue is present within the wound margins.

 No bone is exposed within the wound margins.  No purulent drainage is associated

with the wound.  No cellulitis of the right foot is appreciated.  No maceration of

the skin of the periwound is noted.  Edema of the right and left feet and lower legs

is present on exam today.  Multiple ulcerations of the left foot are present.

Cultures were obtained from an ulceration of the left medial foot over the first

metatarsophalangeal joint. 



ASSESSMENT AND PLAN:  

1. Ulceration of right lateral foot.  The patient also has multiple ulcerations of

the left foot.  For each ulceration, dressing changes of Medihoney, 4x4s, Kerlix,

and an ACE bandage will be continued 3 times per week after cleansing and irrigation

with the assistance of Home Health.  Antibiotic therapy will be initiated based upon

the results of the cultures obtained today.  Arrangements will also be made for Mr. Snider to be seen by Dr. Roland Natarajan of Infectious Diseases.  The patient's

wife states that Mr. Snider has not been seen after completing a course of IV

antibiotics for osteomyelitis. 

2. Lymphedema.

3. Coronary artery disease.

4. Hypertension.

5. Ankylosing spondylitis.

6. Obstructive sleep apnea.

7. History of paroxysmal atrial fibrillation.

8. Gastroesophageal reflux disease.

9. Diabetes mellitus.  The patient's Accu-Chek in clinic today is 283.  The patient

has been reminded that for optimal wound healing, his blood glucoses should remain

below 150. 

10. End-stage renal disease.  The patient is now receiving hemodialysis.







Job ID:  [id number]

## 2019-03-02 ENCOUNTER — HOSPITAL ENCOUNTER (INPATIENT)
Dept: HOSPITAL 92 - ERS | Age: 84
LOS: 5 days | Discharge: HOME HEALTH | DRG: 602 | End: 2019-03-07
Attending: HOSPITALIST | Admitting: HOSPITALIST
Payer: MEDICARE

## 2019-03-02 VITALS — BODY MASS INDEX: 36.8 KG/M2

## 2019-03-02 DIAGNOSIS — E11.22: ICD-10-CM

## 2019-03-02 DIAGNOSIS — L97.919: ICD-10-CM

## 2019-03-02 DIAGNOSIS — Z91.041: ICD-10-CM

## 2019-03-02 DIAGNOSIS — Z99.3: ICD-10-CM

## 2019-03-02 DIAGNOSIS — I50.32: ICD-10-CM

## 2019-03-02 DIAGNOSIS — Z99.2: ICD-10-CM

## 2019-03-02 DIAGNOSIS — L89.899: ICD-10-CM

## 2019-03-02 DIAGNOSIS — J44.9: ICD-10-CM

## 2019-03-02 DIAGNOSIS — E66.01: ICD-10-CM

## 2019-03-02 DIAGNOSIS — Z88.2: ICD-10-CM

## 2019-03-02 DIAGNOSIS — G47.33: ICD-10-CM

## 2019-03-02 DIAGNOSIS — M45.9: ICD-10-CM

## 2019-03-02 DIAGNOSIS — I87.2: ICD-10-CM

## 2019-03-02 DIAGNOSIS — G82.20: ICD-10-CM

## 2019-03-02 DIAGNOSIS — Z79.899: ICD-10-CM

## 2019-03-02 DIAGNOSIS — L03.116: Primary | ICD-10-CM

## 2019-03-02 DIAGNOSIS — I13.2: ICD-10-CM

## 2019-03-02 DIAGNOSIS — E78.5: ICD-10-CM

## 2019-03-02 DIAGNOSIS — I48.2: ICD-10-CM

## 2019-03-02 DIAGNOSIS — I25.10: ICD-10-CM

## 2019-03-02 DIAGNOSIS — Z88.8: ICD-10-CM

## 2019-03-02 DIAGNOSIS — L97.529: ICD-10-CM

## 2019-03-02 DIAGNOSIS — Z79.4: ICD-10-CM

## 2019-03-02 DIAGNOSIS — L97.429: ICD-10-CM

## 2019-03-02 DIAGNOSIS — K21.9: ICD-10-CM

## 2019-03-02 DIAGNOSIS — E11.621: ICD-10-CM

## 2019-03-02 DIAGNOSIS — E11.51: ICD-10-CM

## 2019-03-02 DIAGNOSIS — D63.1: ICD-10-CM

## 2019-03-02 DIAGNOSIS — H60.93: ICD-10-CM

## 2019-03-02 DIAGNOSIS — N18.6: ICD-10-CM

## 2019-03-02 DIAGNOSIS — E11.65: ICD-10-CM

## 2019-03-02 DIAGNOSIS — Z95.5: ICD-10-CM

## 2019-03-02 DIAGNOSIS — Z88.1: ICD-10-CM

## 2019-03-02 LAB
ALBUMIN SERPL BCG-MCNC: 3.3 G/DL (ref 3.4–4.8)
ALP SERPL-CCNC: 107 U/L (ref 40–150)
ALT SERPL W P-5'-P-CCNC: 13 U/L (ref 8–55)
ANION GAP SERPL CALC-SCNC: 17 MMOL/L (ref 10–20)
AST SERPL-CCNC: 21 U/L (ref 5–34)
BILIRUB SERPL-MCNC: 0.3 MG/DL (ref 0.2–1.2)
BUN SERPL-MCNC: 27 MG/DL (ref 8.4–25.7)
CALCIUM SERPL-MCNC: 8.5 MG/DL (ref 7.8–10.44)
CHLORIDE SERPL-SCNC: 101 MMOL/L (ref 98–107)
CO2 SERPL-SCNC: 26 MMOL/L (ref 23–31)
CREAT CL PREDICTED SERPL C-G-VRATE: 0 ML/MIN (ref 70–130)
GLOBULIN SER CALC-MCNC: 2.8 G/DL (ref 2.4–3.5)
GLUCOSE SERPL-MCNC: 256 MG/DL (ref 83–110)
HGB BLD-MCNC: 10.3 G/DL (ref 14–18)
MCH RBC QN AUTO: 31.7 PG (ref 27–31)
MCV RBC AUTO: 104 FL (ref 78–98)
MDIFF COMPLETE?: YES
PLATELET # BLD AUTO: 187 THOU/UL (ref 130–400)
POTASSIUM SERPL-SCNC: 4.8 MMOL/L (ref 3.5–5.1)
RBC # BLD AUTO: 3.25 MILL/UL (ref 4.7–6.1)
SODIUM SERPL-SCNC: 139 MMOL/L (ref 136–145)
WBC # BLD AUTO: 5.2 THOU/UL (ref 4.8–10.8)

## 2019-03-02 PROCEDURE — 96365 THER/PROPH/DIAG IV INF INIT: CPT

## 2019-03-02 PROCEDURE — 93923 UPR/LXTR ART STDY 3+ LVLS: CPT

## 2019-03-02 PROCEDURE — G0257 UNSCHED DIALYSIS ESRD PT HOS: HCPCS

## 2019-03-02 PROCEDURE — 36901 INTRO CATH DIALYSIS CIRCUIT: CPT

## 2019-03-02 PROCEDURE — 87070 CULTURE OTHR SPECIMN AEROBIC: CPT

## 2019-03-02 PROCEDURE — 96376 TX/PRO/DX INJ SAME DRUG ADON: CPT

## 2019-03-02 PROCEDURE — 93005 ELECTROCARDIOGRAM TRACING: CPT

## 2019-03-02 PROCEDURE — 80202 ASSAY OF VANCOMYCIN: CPT

## 2019-03-02 PROCEDURE — 80053 COMPREHEN METABOLIC PANEL: CPT

## 2019-03-02 PROCEDURE — 96367 TX/PROPH/DG ADDL SEQ IV INF: CPT

## 2019-03-02 PROCEDURE — 83605 ASSAY OF LACTIC ACID: CPT

## 2019-03-02 PROCEDURE — 87040 BLOOD CULTURE FOR BACTERIA: CPT

## 2019-03-02 PROCEDURE — S0028 INJECTION, FAMOTIDINE, 20 MG: HCPCS

## 2019-03-02 PROCEDURE — 87205 SMEAR GRAM STAIN: CPT

## 2019-03-02 PROCEDURE — 36415 COLL VENOUS BLD VENIPUNCTURE: CPT

## 2019-03-02 PROCEDURE — 85025 COMPLETE CBC W/AUTO DIFF WBC: CPT

## 2019-03-02 PROCEDURE — 90935 HEMODIALYSIS ONE EVALUATION: CPT

## 2019-03-02 PROCEDURE — 96375 TX/PRO/DX INJ NEW DRUG ADDON: CPT

## 2019-03-02 PROCEDURE — 36416 COLLJ CAPILLARY BLOOD SPEC: CPT

## 2019-03-02 PROCEDURE — 80048 BASIC METABOLIC PNL TOTAL CA: CPT

## 2019-03-02 NOTE — RAD
FOUR VIEWS RIGHT FOOT:

3/2/19

 

HISTORY: 

Bilateral foot wounds and infection. 

 

AP, lateral and both oblique views right foot obtained. 

 

FINDINGS/IMPRESSION:  

Images demonstrate varus deformity of the right foot. Extensive periosteal changes seen along the bas
es of the third, fourth and fifth metatarsals which appear to be chronic. 

 

No evidence of acute fractures seen. 

 

There is extensive soft tissue swelling. 

 

Wrapping material is seen in the mid right foot indenting the skin surface. 

 

If there is concern for osteomyelitis, correlation with pre and postcontrast enhanced MRI images of t
he right foot electively may be of use. 

 

POS: [facility]

## 2019-03-02 NOTE — RAD
THREE VIEWS LEFT FOOT

3/2/19

 

HISTORY: 

Left foot infection.

 

AP, lateral and oblique views left foot is obtained. 

 

Images demonstrate distal tibial and fibular surgical hardware. 

 

There is diffuse left foot demineralization seen. Vascular calcifications seen. 

 

There is an area of lucency seen involving the base of the fifth left metatarsal. This may represent 
a proximal fifth left metatarsal fracture. 

 

No other acute abnormality seen. 

 

IMPRESSION:  

Possible base fifth left metatarsal fracture. 

 

POS: [facility]

## 2019-03-03 RX ADMIN — INSULIN HUMAN PRN UNIT: 100 INJECTION, SOLUTION PARENTERAL at 17:47

## 2019-03-03 NOTE — HP
PRIMARY CARE PHYSICIAN:  Dr. Donaldson.



CHIEF COMPLAINT:  I think my ears are worse.



HISTORY OF PRESENT ILLNESS:  Mr. Snider is a pleasant 83-year-old gentleman, who

has a history of end-stage renal disease and diabetes.  He also has history of

hypertension and atrial fibrillation.  He was in his usual state of health until

recently when he was diagnosed with an ear infection by Dr. Koehler.  He was placed on

antibiotic drops and has an ear wick in both ears and was told that if his symptoms

got worse, he should come to the ER for evaluation.  He says that he felt like his

symptoms were worse.  He was noting more drainage from the ears and as a result, he

came to the ER.  While he was in the emergency room, it was noted that he was having

some green drainage from some chronic leg wounds that he has had for over a year.

He says that he has been seeing Dr. Adorno for this for about a year and that it

seem like they were getting better until about three months ago when she noticed an

area that she was concerned and had osteomyelitis and in fact, he did have

osteomyelitis diagnosed there and has been seeing Dr. Natarajan.  The patient's wife

said he has an appointment scheduled for Tuesday due to the worsening drainage in

his legs.  He also had a problem with his fistula and apparently going to have an

appointment scheduled to evaluate his fistula tomorrow.  Otherwise, the patient

notices a rushing in his ears, which he thinks is related to his blood pressure.  He

denies any chest pain or difficulty breathing.  No fevers.  No chills. 



REVIEW OF SYSTEMS:  All systems were reviewed and are negative except for that

mentioned in the history of present illness. 



PAST MEDICAL HISTORY:  Significant for end-stage renal disease, on hemodialysis,

chronic diastolic heart failure, coronary artery disease, diabetes mellitus type 2,

ankylosing spondylitis, atrial fibrillation, hypertension, and dyslipidemia. 



PAST SURGICAL HISTORY:  He has had a stent placed and hemorrhoidectomy.



SOCIAL HISTORY:  He is wheelchair bound.  He would like to be a full code.  He is a

nonsmoker and nondrinker. 



FAMILY HISTORY:  Significant for multiple myeloma.



CURRENT MEDICATIONS:  He is not really sure about his medications.  He currently

does not have them with him; however, the emergency room records indicate; 

1. Lantus insulin 30 units daily.

2. Humalog R sliding scale.

3. Atorvastatin 80 mg daily.

4. Carvedilol 6.25 mg twice daily.

5. Metformin 1000 mg twice a day.

6. Terazosin 5 mg daily.

7. Ranitidine 150 mg two tablets daily.

8. Dexilant 60 mg daily.

9. Multaq 400 mg twice a day.

10. BiDil 20/37.5 one tablet twice a day.

11. Glipizide 10 mg daily.

12. Iron sulfate 325 mg twice daily.

13. Furosemide 20 tablets four tablets twice a day.

14. Gabapentin 300 mg twice daily and 600 mg in the day.



PHYSICAL EXAMINATION:

GENERAL:  He is alert and oriented.  He appears to be in no acute distress. 

VITAL SIGNS:  His blood pressure is ranged from 151 to 214 systolic, heart rate is

85, respiratory rate of 16, temperature is 98.3. 

HEENT:  His pupils are equal, round, and reactive.  Extraocular muscles are intact.

His sclerae anicteric.  Throat, no erythema, no exudates.  No adenopathy on his

ears.  He has some whitish liquid drainage from both ears and some bloody drainage

from the left.  Throat, he has dry mucous membranes. 

NECK:  There is no adenopathy.  No bruits. 

LUNGS:  Clear to auscultation.  There is no wheezing.  No rales. 

CARDIOVASCULAR:  He had a normal S1, S2.  There is no S3 or S4.  No murmurs, clicks,

or rubs. 

ABDOMEN:  Obese.  It is soft, nontender, and nondistended.  Positive for bowel

sounds.  No rebound or guarding. 

EXTREMITIES:  He has multiple open wounds on the left shin and the heel of the left

foot and also on the right foot, both of these have dark eschar base. 

NEUROLOGIC:  He has flaccid paralysis of both lower extremities.  Upper extremity

strength is intact. 

SKIN AND INTEGUMENT:  As before.  He has chronic venous stasis as well as some

eschar on the lower extremities on the left and right. 



LABORATORY DATA:  Sodium was 139, potassium 4.8, chloride is 101, CO2 is 26, BUN of

27, creatinine 3.15, glucose is 256.  White blood cell count 5.2, hemoglobin 10.3,

hematocrit is 33.8, and platelet count is 187. 



ASSESSMENT:  This is a pleasant 83-year-old gentleman, who is being placed in

observation for possible worsening lower extremity wounds as well as concerned for

worsening of a what appears to be an otitis externa. 

1. With regard to the otitis externa, we will consult ENT.  He sees Dr. Koehler and he

sees says that he will see only Dr. Koehler in the hospital as he has had bad

experiences with other physicians in the past.  We will continue the current IV

antibiotics including Zosyn and we will add vancomycin. 

2. With regard to the lower extremity leg wounds, we will continue Zosyn as well as

vancomycin.  Get an MRI of the lower extremities and consult Dr. Natarajan as well as

get a wound care consult. 

3. End-stage renal disease, on hemodialysis.  Dr. Dye is his nephrologist, we will

go ahead and consult him.  Hopefully, he can coordinate what was needed with regard

to his AV fistula. 

4. Diabetes mellitus.  We need to reconcile and restart his home medications as well

as a sliding scale insulin. 

5. Hypertension.  Again reconcile home medications and have p.r.n. medications

available. 

6. The patient will be on DVT and GI prophylaxis.







Job ID:  [id number]

## 2019-03-04 LAB
ANION GAP SERPL CALC-SCNC: 18 MMOL/L (ref 10–20)
BASOPHILS # BLD AUTO: 0 THOU/UL (ref 0–0.2)
BASOPHILS NFR BLD AUTO: 0.7 % (ref 0–1)
BUN SERPL-MCNC: 43 MG/DL (ref 8.4–25.7)
CALCIUM SERPL-MCNC: 8.6 MG/DL (ref 7.8–10.44)
CHLORIDE SERPL-SCNC: 103 MMOL/L (ref 98–107)
CO2 SERPL-SCNC: 21 MMOL/L (ref 23–31)
CREAT CL PREDICTED SERPL C-G-VRATE: 23 ML/MIN (ref 70–130)
EOSINOPHIL # BLD AUTO: 0.2 THOU/UL (ref 0–0.7)
EOSINOPHIL NFR BLD AUTO: 3 % (ref 0–10)
GLUCOSE SERPL-MCNC: 203 MG/DL (ref 83–110)
HGB BLD-MCNC: 9.9 G/DL (ref 14–18)
LYMPHOCYTES # BLD: 1.4 THOU/UL (ref 1.2–3.4)
LYMPHOCYTES NFR BLD AUTO: 19.4 % (ref 21–51)
MCH RBC QN AUTO: 32 PG (ref 27–31)
MCV RBC AUTO: 105 FL (ref 78–98)
MONOCYTES # BLD AUTO: 1 THOU/UL (ref 0.11–0.59)
MONOCYTES NFR BLD AUTO: 14 % (ref 0–10)
NEUTROPHILS # BLD AUTO: 4.4 THOU/UL (ref 1.4–6.5)
NEUTROPHILS NFR BLD AUTO: 63 % (ref 42–75)
PLATELET # BLD AUTO: 199 THOU/UL (ref 130–400)
POTASSIUM SERPL-SCNC: 4.8 MMOL/L (ref 3.5–5.1)
RBC # BLD AUTO: 3.1 MILL/UL (ref 4.7–6.1)
SODIUM SERPL-SCNC: 137 MMOL/L (ref 136–145)
WBC # BLD AUTO: 7.1 THOU/UL (ref 4.8–10.8)

## 2019-03-04 RX ADMIN — ALUMINUM ZIRCONIUM TRICHLOROHYDREX GLY SCH EACH: 0.2 STICK TOPICAL at 20:55

## 2019-03-04 RX ADMIN — INSULIN HUMAN PRN UNIT: 100 INJECTION, SOLUTION PARENTERAL at 11:17

## 2019-03-04 RX ADMIN — PIPERACILLIN SODIUM, TAZOBACTAM SODIUM SCH MLS: 2; .25 INJECTION, POWDER, LYOPHILIZED, FOR SOLUTION INTRAVENOUS at 00:20

## 2019-03-04 RX ADMIN — INSULIN LISPRO PRN UNIT: 100 INJECTION, SOLUTION INTRAVENOUS; SUBCUTANEOUS at 20:50

## 2019-03-04 RX ADMIN — PIPERACILLIN SODIUM, TAZOBACTAM SODIUM SCH MLS: 2; .25 INJECTION, POWDER, LYOPHILIZED, FOR SOLUTION INTRAVENOUS at 11:16

## 2019-03-04 RX ADMIN — HYDROCODONE BITARTRATE AND ACETAMINOPHEN PRN TAB: 5; 325 TABLET ORAL at 09:27

## 2019-03-04 RX ADMIN — INSULIN LISPRO PRN UNIT: 100 INJECTION, SOLUTION INTRAVENOUS; SUBCUTANEOUS at 17:33

## 2019-03-04 NOTE — PDOC.PN
- Subjective


Encounter Start Date: 03/04/19


Encounter Start Time: 15:30


Sleepy, will awaken to voice, answer questions.  Discomfort left ear > right 

ear in context of otitis externa.  Discussed case at length with Mrs. Snider.  She contacted Dr. Koehler's office today, received recommendation to 

continue otic drops in addition to systemic antibiotics.  No nausea or 

vomiting.  Hyperglycemia on admission noted, severe.








- Objective


Resuscitation Status - Order Detail:





03/03/19 12:52


Resuscitation Status Routine 


   Resuscitation Status: FULL: Full Resuscitation








Vital Signs & Weight: 


 Vital Signs (12 hours)











  Temp Pulse Resp BP BP Pulse Ox


 


 03/04/19 11:20  97.4 F L  86  20   154/67 H  98


 


 03/04/19 07:03  98.4 F  85  19   122/59 L  100


 


 03/04/19 06:29   86   196/79 H  


 


 03/04/19 04:20  99.0 F  85  18   172/72 H  100








 Weight











Admit Weight                   292 lb 15.909 oz


 


Weight                         286 lb 14.4 oz














I&O: 


 











 03/03/19 03/04/19 03/05/19





 06:59 06:59 06:59


 


Intake Total  260 100


 


Balance  260 100











Result Diagrams: 


 03/04/19 05:59





 03/04/19 05:59


Additional Labs: 


 Accuchecks











  03/04/19 03/04/19 03/03/19





  10:56 05:24 20:50


 


POC Glucose  316 H  213 H  317 H














  03/03/19





  16:47


 


POC Glucose  341 H














Phys Exam





- Physical Examination


Sleepy, difficulty hearing


HEENT: PERRLA, moist MMs, oral pharynx no lesions


Neck: supple, full ROM


Respiratory: clear to auscultation bilateral


anterior/lateral


Cardiovascular: RRR


Gastrointestinal: soft, non-tender


chronic BLE


Neurological: non-focal, moves all 4 limbs


hearing impairment


Psychiatric: A&O x 3


Deviation from normal: somewhat sleepy


Deviation from normal: calluses present bilateral forefeet, right foot fourth 

and fifth toes with 


-: ulceration/eschar, blisters overlying left dorsum, heel





Dx/Plan


(1) Osteomyelitis


Code(s): M86.9 - OSTEOMYELITIS, UNSPECIFIED   Status: Acute   





(2) ESRD (end stage renal disease) on dialysis


Code(s): N18.6 - END STAGE RENAL DISEASE; Z99.2 - DEPENDENCE ON RENAL DIALYSIS 

  Status: Acute   





(3) Ankylosing spondylitis


Code(s): M45.9 - ANKYLOSING SPONDYLITIS OF UNSPECIFIED SITES IN SPINE   Status: 

Chronic   





(4) CAD (coronary artery disease)


Code(s): I25.10 - ATHSCL HEART DISEASE OF NATIVE CORONARY ARTERY W/O ANG PCTRS 

  Status: Chronic   


Qualifiers: 


   Coronary Disease-Associated Artery/Lesion type: native artery   Native vs. 

transplanted heart: native heart   Associated angina: without angina   

Qualified Code(s): I25.10 - Atherosclerotic heart disease of native coronary 

artery without angina pectoris   





(5) DM type 2 (diabetes mellitus, type 2)


Status: Chronic   


Qualifiers: 


   Diabetes mellitus long term insulin use: with long term use   Diabetes 

mellitus complication status: with neurologic complications   Diabetes mellitus 

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 

diabetes mellitus with diabetic polyneuropathy; Z79.4 - Long term (current) use 

of insulin   


Comment: reasonably controlled   





(6) HTN (hypertension)


Code(s): I10 - ESSENTIAL (PRIMARY) HYPERTENSION   Status: Chronic   


Qualifiers: 


   Hypertension type: essential hypertension   Qualified Code(s): I10 - 

Essential (primary) hypertension   


Comment: continue PRN IV hydralazine   





(7) Paroxysmal atrial fibrillation


Code(s): I48.0 - PAROXYSMAL ATRIAL FIBRILLATION   Status: Chronic   





(8) Ulcer of right foot


Code(s): L97.519 - NON-PRS CHRONIC ULCER OTH PRT RIGHT FOOT W UNSP SEVERITY   

Status: Chronic   





- Plan





* ID - history of osteomyelitis right foot, status post completed course of 

therapy.  Per outpt wound care, concern for interval worsening clinically, 

referred to Dr. Natarajan.  Plain x ray imaging appears stable.  Arterial dopplers 

ordered per Dr. Natarajan, his care is appreciated.  At this time, admit to 

inpatient as stay will cross an additional midnight pending recommendations.  

Patient high risk for osteomyelitis given previous history of the same in the 

context of ESRD/diabetes with peripheral neuropathy.  Presently on Zosyn and 

Vancomycin.  Blood cultures thus far are negative.


* Otitis externa - Patient has follow up appt 3/8 with Dr. Koehler.  Otic drops 

resumed (Ciprodex 0.3-0.1% three gtt both ears tid)


* Renal - ESRD on HD with tunneled catheter, Tues/Thurs/Sat.  Fistula placed 

but not yet ready for use.  Epogen 7500 units weekly per Dr. Dye, his care is 

appreciated.


* Diastolic CHF - appears compensated presently


* Cards - history of PAF, presently on multaq


* Endo - Diabetes, suboptimal control, change to aggressive humalog ss, which 

more closely aligns with his home sliding scale.  Somewhat sleepy, if 

persistent decrease gabapentin.  Mrs. Snider attributes to poor sleep in ER 

hold/hospital.


* Ankylosing spondylitis with pelvic fusion and paraplegia


* High risk given age/severe medical comorbidities.

## 2019-03-04 NOTE — ULT
ULTRASOUND LOWER EXTREMITY BILATERAL ARTERIAL DOPPLER:

 

HISTORY: 

Chronic ulcers of the feet.

 

COMPARISON: 

None.

 

FINDINGS: 

Real-time, gray scale, color Doppler, with spectral analysis of the bilateral lower extremity arteria
l system is performed.

 

There is monophasic waveform of the right common femoral and deep femoral arteries.  There is biphasi
c waveform of the proximal mid and distal femoral artery.  Monophasic waveform of the popliteal arter
y as well of the anterior tibial, posterior tibial, and dorsalis pedis arteries.  Moderate atheroscle
rotic plaque throughout the vascular system.  No focal hemodynamically significant stenosis.  Flow th
rough the trifurcation is present.

 

In the left lower extremity there is biphasic waveform from the common femoral artery to the anterior
 tibial artery.  Posterior tibial and dorsalis pedis arteries have monophasic waveform.  There is spe
ctral broadening below the trifurcation.  There is abnormally elevated peak systolic velocity in the 
distal femoral artery of 207 cm/s.  There is flow to the foot through the trifurcation.

 

IMPRESSION: 

1.  Moderate atherosclerotic disease with flow to the feet bilaterally.

 

2.  Abnormal elevation and peak systolic velocity distal femoral artery on the left suggesting a sten
osis which may be not be hemodynamically significant.  Conventional angiogram may be beneficial in th
is patient.

 

POS: HOLGER

## 2019-03-04 NOTE — PRG
DATE OF SERVICE:  03/04/2019



SUBJECTIVE:  Mr. Snider is an 83-year-old white male with ESRD, on maintenance

hemodialysis, who initially came complaining of foot infection.  He also mentioned

about this area getting worse.  He tells me he is getting medication for this.  He

has been started also on some IV antibiotics.  We are following this patient up for

his maintenance hemodialysis. 



This morning, he voices no new complaints.  He is sleepy, but arousable. 



He was previously on gabapentin and we have decided to hold it off temporarily to

see if the patient will be more awake. 



OBJECTIVE:  VITAL SIGNS:  Blood pressure is 172/72, heart rate 85, respiratory rate

18, temperature 99, and pulse ox 100% on room air. 

GENERAL:  Awake, alert, supine, comfortable, not in overt distress.  Obese. 

SKIN:  Adequate turgor. 

HEENT:  He has a slightly pale conjunctivae.  Anicteric sclerae. 

NECK:  No neck mass.  No carotid bruits.  No JVD. 

CHEST:  No deformities. 

LUNGS:  Clear breath sounds.  No wheezing.  No crackles. 

HEART:  Normal sinus rhythm.  No murmur.  No gallops.  No rubs. 

ABDOMEN:  Globular, soft, and nontender.  No masses. 

EXTREMITIES:  Positive for edema.  Positive for wound dressing.  Decreased motor.



MEDICATIONS:  Medications of March 4, 2019, reviewed.



LABORATORY DATA:  Laboratories of March 4, 2019; white count 7.1, hemoglobin 9.9.

Sodium 137, potassium 4.8, chloride 103, carbon dioxide 21, BUN 43, creatinine 4.56,

glucose 203, and calcium 8.6. 



ASSESSMENT AND PLAN:  

1. Chronic left leg infection/cellulitis, on IV antibiotics.

2. Anemia.  Restart Epogen at 7500 units subcu every week.

3. End-stage renal disease, stable.  We will continue current Tuesday, Thursday, and

Saturday hemodialysis regimen.  No indication for an emergent dialysis today.  We

will recheck basic metabolic and CBC in a.m. 







Job ID:  [id number]

## 2019-03-04 NOTE — CON
DATE OF CONSULTATION:  03/04/2019



REASON FOR CONSULTATION:  Leg inflammatory changes, ulcers.



HISTORY OF PRESENT ILLNESS:  An 83-year-old who has a history of bilateral clubbed

feet with chronic callus in the forefoot area.  The patient had been ambulatory

until few years before and reportedly developed paraplegia and has been confined to

a wheelchair since then.  I had seen him in the outpatient setting in the past

because of possible osteomyelitis in view of the bone exposure.  An x-ray

demonstrated areas of fractures with callus formation and some possible air at the

heel segment.  My initial impression was that the x-ray findings probably

represented chronic fractures without superimposed infection in view of the

improvement in the wound and lack of inflammatory changes.  The patient had an MRI

to verify that impression and it showed findings consistent with osteomyelitis of

the fifth metatarsal.  He felt that he would like to try conservative management and

we gave instructions to the nurse to start treatment.  He completed therapy on

10/29/2018 and we switched him to minocycline for 2 weeks.  The patient now presents

with complaint of worsening ear drainage.  Of note, he does have a history of

longstanding type-2 diabetes, ankylosing spondylitis which seems to have resulted in

the paraplegia, and end-stage renal disease, on hemodialysis with tunneled catheter

in the right IJ position.  After arrival, he was noticed to have multiple leg wounds

and we were asked to evaluate those.  He denies any pain at this time.  No change in

visual symptoms, sore throat, odynophagia, or dysphagia.  No toothache.  No back

pain.  No respiratory symptoms, specifically no cough or sputum production.  No

chest pain.  No abdominal pain or diarrhea.  He makes minimal urinary output.

Initial labs CO2 was 26, creatinine 3.15, glucose 256.  White cell count 5.2,

hemoglobin 10.3, platelets 187. 



PAST MEDICAL HISTORY:  Includes;

1. Type-2 diabetes.

2. End-stage renal disease, on hemodialysis with tunneled catheter.  He had a recent

AV fistula placed, left upper extremity, which does not appear to be mature at this

time. 

3. Coronary artery disease.

4. Ankylosing spondylitis with eventual development of paraplegia.

5. Atrial fibrillation.

6. Chronic wounds in lower extremities.

7. Hypertension.

8. Dyslipidemia.



PAST SURGICAL HISTORY:  

1. Stent placement.

2. Hemorrhoidectomy.



SOCIAL HISTORY:  Wheelchair bound.  I believe he lives in [place] with wife.  Wife

drives him to dialysis three times a week.  Never smoker. 



FAMILY HISTORY:  Multiple myeloma.



ALLERGY HISTORY:  Diazepam, iodinated contrast, nitrofurantoin.



CURRENT MEDICATIONS:  Include;

1. Norco.

2. Tums.

3. Coreg.

4. Dextrose.

5. Procrit.

6. Glucagon.

7. Apresoline.

8. Insulin.

9. Isordil.

10. Zosyn.

11. Vancomycin.



PHYSICAL EXAMINATION:

VITAL SIGNS:  T-max 99, blood pressure 120/59, pulse 85, respirations 19, O2

saturation 100. 

SKIN:  Shows an irregular ulcerated area, oval shaped, with light yellow base cover.

 No undermining at the wound margins.  In the more most anterior portion of the

wound, there is a little hemorrhagic ulcerated area.  Around this area, there is

skin, which is not erythematous.  There is hyperkeratosis noted over the dorsum of

the right foot.  In fourth toe, dorsal aspect, there is some dark eschar along the

nail bed.  In the distal aspect of the fourth toe dorsal side, he has a little

ulcerated region about 3 cm linear shaped.  He has a necrotic ulcer, left anterior

leg, measuring about 3 cm with dark eschar at the base and a thin rim of erythema

surrounding it.  Few other areas of blistering, there is an area of necrotic

ulceration in medial aspect of the left first toe amputation MPJ site.  In the back

and heel, there is an area of irregular ulceration measuring about 4 x 4 cm with

about 50% red tissue and 50% dark eschar covered.  In the left foot, there is a lot

of destruction of the nails.  In the scab over the nail bed, hyperkeratosis noted as

well.  Irregular ulcer over the dorsum aspect of the left foot.  There is evidence

suggestive of stasis dermatitis in the lower extremities.  Some macerations and

early stage ulceration noted. 

LYMPH:  The patient has no lymphadenopathy. 

HEENT:  Ocular movements are bit disconjugate.  Sclerae are white.  Conjunctivae

showed evidence of slight blepharitis.  Oral cavity still with quite a few teeth in

place with quite a bit of decay and gum disease. 

NECK:  Supple. 

LUNGS:  Symmetric air entry.  No crackles or wheezing. 

CARDIAC:  S1, S2.  Regular rate with a soft aortic murmur. 

ABDOMEN:  Soft, not distended or tender.  No ascites.  No bladder distention. 

EXTREMITIES:  I could not feel any dorsalis pedis or popliteal pulses. 

SKIN:  Warm in the lower extremities.  Cap refill is about 3 seconds. 

NEUROLOGIC:  He is oriented, follow commands, pleasant.  He is paraplegic, which is

a chronic finding. 



LABORATORY DATA:  Latest labs; white cell count is at 7.1, hemoglobin 9.9, platelets

199, 62% neutrophils, 19% lymphocytes.  AST 21, ALT 13, and alkaline phosphatase is

107.  Albumin 3.3.  The patient had a foot x-ray done on 03/02 and shows varus

deformity, right foot.  Periosteal changes seen along the base of third, fourth, and

fifth metatarsal, which are chronic. 



ASSESSMENT:  

1. Type-2 diabetes with end-stage renal disease.

2. Likely peripheral vascular disease.

3. Chronic wounds in lower extremities associated with the combination of pressure

injury plus arterial vascular insufficiency. 

4. Recent treatment for osteomyelitis of the right foot, which was completed.



DISCUSSION:  The followup x-rays show stability and I would not recommend any

further antimicrobial therapy for the extremities.  He does need arterial vascular

study to evaluate the perfusion of lower extremities, but for now, I would only

continue wound management and prevention of further pressure injury. 







Job ID:  [id number]

## 2019-03-05 LAB
ANION GAP SERPL CALC-SCNC: 18 MMOL/L (ref 10–20)
BASOPHILS # BLD AUTO: 0 THOU/UL (ref 0–0.2)
BASOPHILS NFR BLD AUTO: 0.3 % (ref 0–1)
BUN SERPL-MCNC: 56 MG/DL (ref 8.4–25.7)
CALCIUM SERPL-MCNC: 8.4 MG/DL (ref 7.8–10.44)
CHLORIDE SERPL-SCNC: 102 MMOL/L (ref 98–107)
CO2 SERPL-SCNC: 21 MMOL/L (ref 23–31)
CREAT CL PREDICTED SERPL C-G-VRATE: 20 ML/MIN (ref 70–130)
EOSINOPHIL # BLD AUTO: 0.2 THOU/UL (ref 0–0.7)
EOSINOPHIL NFR BLD AUTO: 3 % (ref 0–10)
GLUCOSE SERPL-MCNC: 307 MG/DL (ref 83–110)
HGB BLD-MCNC: 9.5 G/DL (ref 14–18)
LYMPHOCYTES # BLD: 1.3 THOU/UL (ref 1.2–3.4)
LYMPHOCYTES NFR BLD AUTO: 22.7 % (ref 21–51)
MCH RBC QN AUTO: 32.5 PG (ref 27–31)
MCV RBC AUTO: 103 FL (ref 78–98)
MONOCYTES # BLD AUTO: 0.8 THOU/UL (ref 0.11–0.59)
MONOCYTES NFR BLD AUTO: 13.1 % (ref 0–10)
NEUTROPHILS # BLD AUTO: 3.6 THOU/UL (ref 1.4–6.5)
NEUTROPHILS NFR BLD AUTO: 60.9 % (ref 42–75)
PLATELET # BLD AUTO: 184 THOU/UL (ref 130–400)
POTASSIUM SERPL-SCNC: 5 MMOL/L (ref 3.5–5.1)
RBC # BLD AUTO: 2.91 MILL/UL (ref 4.7–6.1)
SODIUM SERPL-SCNC: 136 MMOL/L (ref 136–145)
VANCOMYCIN SERPL-MCNC: 15.4 UG/ML
VANCOMYCIN SERPL-MCNC: 15.4 UG/ML
WBC # BLD AUTO: 5.9 THOU/UL (ref 4.8–10.8)

## 2019-03-05 PROCEDURE — 5A1D70Z PERFORMANCE OF URINARY FILTRATION, INTERMITTENT, LESS THAN 6 HOURS PER DAY: ICD-10-PCS | Performed by: INTERNAL MEDICINE

## 2019-03-05 RX ADMIN — INSULIN LISPRO PRN UNIT: 100 INJECTION, SOLUTION INTRAVENOUS; SUBCUTANEOUS at 06:18

## 2019-03-05 RX ADMIN — HYDROCODONE BITARTRATE AND ACETAMINOPHEN PRN TAB: 5; 325 TABLET ORAL at 14:09

## 2019-03-05 RX ADMIN — INSULIN LISPRO PRN UNIT: 100 INJECTION, SOLUTION INTRAVENOUS; SUBCUTANEOUS at 17:49

## 2019-03-05 RX ADMIN — INSULIN LISPRO PRN UNIT: 100 INJECTION, SOLUTION INTRAVENOUS; SUBCUTANEOUS at 20:44

## 2019-03-05 RX ADMIN — ALUMINUM ZIRCONIUM TRICHLOROHYDREX GLY SCH EACH: 0.2 STICK TOPICAL at 09:42

## 2019-03-05 RX ADMIN — ALUMINUM ZIRCONIUM TRICHLOROHYDREX GLY SCH EACH: 0.2 STICK TOPICAL at 20:43

## 2019-03-05 RX ADMIN — PIPERACILLIN SODIUM, TAZOBACTAM SODIUM SCH MLS: 2; .25 INJECTION, POWDER, LYOPHILIZED, FOR SOLUTION INTRAVENOUS at 01:08

## 2019-03-05 RX ADMIN — PIPERACILLIN SODIUM, TAZOBACTAM SODIUM SCH MLS: 2; .25 INJECTION, POWDER, LYOPHILIZED, FOR SOLUTION INTRAVENOUS at 13:14

## 2019-03-05 RX ADMIN — INSULIN LISPRO PRN UNIT: 100 INJECTION, SOLUTION INTRAVENOUS; SUBCUTANEOUS at 13:26

## 2019-03-05 RX ADMIN — ALUMINUM ZIRCONIUM TRICHLOROHYDREX GLY SCH EACH: 0.2 STICK TOPICAL at 16:33

## 2019-03-05 NOTE — PDOC.PN
- Subjective


Encounter Start Date: 03/05/19


Encounter Start Time: 08:30


Subjective: no sob, feels better





- Objective


Resuscitation Status - Order Detail:





03/03/19 12:52


Resuscitation Status Routine 


   Resuscitation Status: FULL: Full Resuscitation








MAR Reviewed: Yes


Vital Signs & Weight: 


 Vital Signs (12 hours)











  Temp Pulse Resp BP Pulse Ox


 


 03/05/19 07:07  98.6 F  85  20  137/63  96


 


 03/05/19 04:00  98.1 F  89  20  139/59 L  99








 Weight











Admit Weight                   292 lb 15.909 oz


 


Weight                         289 lb














I&O: 


 











 03/04/19 03/05/19 03/06/19





 06:59 06:59 06:59


 


Intake Total 260 100 


 


Balance 260 100 











Result Diagrams: 


 03/05/19 05:43





 03/05/19 05:43


Additional Labs: 


 Accuchecks











  03/05/19 03/04/19 03/04/19





  05:49 20:29 17:19


 


POC Glucose  305 H  457 H  451 H














  03/04/19





  10:56


 


POC Glucose  316 H














Phys Exam





- Physical Examination


HEENT: moist MMs, sclera anicteric


Neck: no JVD, supple


Respiratory: no wheezing, no rales


Cardiovascular: RRR, no significant murmur


Gastrointestinal: soft, positive bowel sounds


multiple ulcers b/l LE, edema+


chronic paraparesis


Psychiatric: normal affect, A&O x 3





Dx/Plan


(1) Ulcers of both lower extremities


Code(s): L97.919 - NON-PRS CHRONIC ULC UNSP PRT OF R LOW LEG W UNSP SEVERITY; 

L97.929 - NON-PRS CHRONIC ULC UNSP PRT OF L LOW LEG W UNSP SEVERITY   Status: 

Acute   Comment: multiple b/l, likely pressure ulcers   





(2) ESRD (end stage renal disease) on dialysis


Code(s): N18.6 - END STAGE RENAL DISEASE; Z99.2 - DEPENDENCE ON RENAL DIALYSIS 

  Status: Chronic   





(3) CAD (coronary artery disease)


Code(s): I25.10 - ATHSCL HEART DISEASE OF NATIVE CORONARY ARTERY W/O ANG PCTRS 

  Status: Chronic   


Qualifiers: 


   Coronary Disease-Associated Artery/Lesion type: native artery   Native vs. 

transplanted heart: native heart   Associated angina: without angina   

Qualified Code(s): I25.10 - Atherosclerotic heart disease of native coronary 

artery without angina pectoris   





(4) Chronic a-fib


Code(s): I48.2 - CHRONIC ATRIAL FIBRILLATION   Status: Chronic   





(5) Chronic anemia


Code(s): D64.9 - ANEMIA, UNSPECIFIED   Status: Chronic   





(6) Chronic diastolic heart failure


Code(s): I50.32 - CHRONIC DIASTOLIC (CONGESTIVE) HEART FAILURE   Status: 

Chronic   





(7) DM type 2 (diabetes mellitus, type 2)


Status: Chronic   


Qualifiers: 


   Diabetes mellitus long term insulin use: with long term use   Diabetes 

mellitus complication status: with neurologic complications   Diabetes mellitus 

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 

diabetes mellitus with diabetic polyneuropathy; Z79.4 - Long term (current) use 

of insulin   





(8) GERD (gastroesophageal reflux disease)


Code(s): K21.9 - GASTRO-ESOPHAGEAL REFLUX DISEASE WITHOUT ESOPHAGITIS   Status: 

Chronic   


Qualifiers: 


   Esophagitis presence: esophagitis presence not specified   Qualified Code(s)

: K21.9 - Gastro-esophageal reflux disease without esophagitis   


Comment: stable   





(9) HTN (hypertension)


Code(s): I10 - ESSENTIAL (PRIMARY) HYPERTENSION   Status: Chronic   


Qualifiers: 


   Hypertension type: essential hypertension   Qualified Code(s): I10 - 

Essential (primary) hypertension   





(10) GARCÍA (obstructive sleep apnea)


Code(s): G47.33 - OBSTRUCTIVE SLEEP APNEA (ADULT) (PEDIATRIC)   Status: Chronic

   Comment: CPAP qhs and with naps   





- Plan


is on vanc and zosyn


-: usg arterial doppler ?left femoral stenosis, CTS consultation


-: continue coreg, hydralazine, isordil, hytrin


-: PT eval, wound care for multiple ulcerations


-: prognosis guarded





* .








Review of Systems





- Medications/Allergies


Allergies/Adverse Reactions: 


 Allergies











Allergy/AdvReac Type Severity Reaction Status Date / Time


 


diazepam [From Valium] Allergy   Verified 08/22/18 04:35


 


Iodinated Contrast- Oral and Allergy   Verified 08/22/18 04:35





IV Dye     





[Iodinated Contrast Media -     





IV Dye]     


 


nitrofurantoin Allergy   Verified 08/22/18 04:35





[From Macrobid]     


 


sodium hypochlorite solution Allergy   Verified 08/22/18 04:35





[sodium hypochlorite]     


 


Sulfa (Sulfonamide Allergy   Verified 08/22/18 04:35





Antibiotics)     


 


sulfamethoxazole Allergy   Verified 08/22/18 04:35





[From Bactrim]     


 


trimethoprim [From Bactrim] Allergy   Verified 08/22/18 04:35


 


sodium hyperchlorite Allergy Unknown  Uncoded 08/22/18 04:35











Medications: 


 Current Medications





Acetaminophen (Tylenol)  650 mg PO Q4H PRN


   PRN Reason: Headache/Fever/Mild Pain (1-3)


Hydrocodone Bitart/Acetaminophen (Norco 5/325)  1 tab PO Q4H PRN


   PRN Reason: Moderate Pain (4-6)


   Last Admin: 03/04/19 09:27 Dose:  1 tab


Calcium Carbonate (Tums)  1,000 mg PO Q4H PRN


   PRN Reason: Heartburn  or Indigestion


Carvedilol (Coreg)  6.25 mg PO BID [facility]


   Last Admin: 03/04/19 20:57 Dose:  6.25 mg


Dextrose/Water (Dextrose 50%)  25 gm SLOW IVP PRN PRN


   PRN Reason: Hypoglycemia


Dextrose/Water (Dextrose 50%)  25 gm SLOW IVP PRN PRN


   PRN Reason: Hypoglycemia


Epoetin Franklin (Procrit)  7,500 units SC Q7D [facility]


   Last Admin: 03/04/19 11:40 Dose:  7,500 units


Glucagon (Glucagon)  1 mg IM PRN PRN


   PRN Reason: Hypoglycemia


Glucagon (Glucagon)  1 mg IM PRN PRN


   PRN Reason: Hypoglycemia


Hydralazine HCl (Apresoline)  10 mg SLOW IVP Q4H PRN


   PRN Reason: SBP > 180 and HR < 70


   Last Admin: 03/04/19 16:22 Dose:  10 mg


Hydralazine HCl (Apresoline)  37.5 mg PO TID [facility]


   Last Admin: 03/04/19 20:56 Dose:  37.5 mg


Dextrose/Water (D5w)  1,000 mls @ 0 mls/hr IV .Q0M PRN


   PRN Reason: Hypoglycemia


Piperacillin Sod/Tazobactam (Sod 2.25 gm/ Sodium Chloride)  100 mls @ 200 mls/

hr IVPB 0000,1200 [facility]


   Last Admin: 03/05/19 01:08 Dose:  100 mls


Piperacillin Sod/Tazobactam (Sod 0.75 gm/ Sodium Chloride)  100 mls @ 200 mls/

hr IVPB TuThSa [facility]


Vancomycin HCl 1.5 gm/ Sodium (Chloride)  300 mls @ 200 mls/hr IVPB WILLCALL [facility]


Vancomycin HCl 1.25 gm/ Sodium (Chloride)  250 mls @ 166.667 mls/hr IVPB 

WILLCALL [facility]


Vancomycin HCl 1 gm/ Device  200 mls @ 200 mls/hr IVPB WILLCALL [facility]


Vancomycin HCl 750 mg/ Sodium (Chloride)  250 mls @ 250 mls/hr IVPB WILLCALL [facility]


Dextrose/Water (D5w)  1,000 mls @ 0 mls/hr IV .Q0M PRN


   PRN Reason: Hypoglycemia


Insulin Human Lispro (Humalog)  0 units SC .AGGRESSIVE SLIDING  PRN


   PRN Reason: Aggressive Correctional Scale


   Last Admin: 03/05/19 06:18 Dose:  11 unit


Isosorbide Dinitrate (Isordil)  20 mg PO TID [facility]


   Last Admin: 03/04/19 20:55 Dose:  20 mg


Labetalol HCl (Normodyne)  20 mg SLOW IVP Q4H PRN


   PRN Reason: SBP > 180 and HR >/= 70


Miscellaneous Medication (Pharmacy To Dose)  0 each IVPB DAILYPRN PRN


   PRN Reason: LABS


Hold Vancomycin For (Level >20)  0 each FS .AT DIALYSIS [facility]


Ciprodex Patient's (Home Medication)  0 each EA EAR TID [facility]


   Last Admin: 03/04/19 20:55 Dose:  1 each


Terazosin HCl (Hytrin)  5 mg PO DAILY [facility]


   Last Admin: 03/04/19 09:13 Dose:  5 mg

## 2019-03-05 NOTE — PRG
DATE OF SERVICE:  03/05/2019



SUBJECTIVE:  Mr. Snider is an 83-year-old white male with known history of ESRD,

on maintenance hemodialysis, and we are following him up for his dialysis.  He also

was admitted due to infected feet.  He is on empiric antibiotics at the present

time. 



He is feeling better this morning.  He denies any chest pain or shortness of breath.



OBJECTIVE:  VITAL SIGNS:  Blood pressure is 139/59, heart rate 89, respiratory rate

20, temperature 98.1, pulse ox 99%. 

GENERAL:  Noted to be awake, obese, comfortable, not in distress. 

SKIN:  Adequate turgor. 

HEENT:  Slightly pale conjunctivae.  Anicteric sclerae.  No neck mass.  No carotid

bruits.  No JVD. 

CHEST:  No deformities. 

LUNGS:  Clear breath sounds.  No wheezing.  No crackles. 

HEART:  Normal sinus rhythm.  No murmurs, gallops, or rubs. 

ABDOMEN:  Globular, soft, nontender.  No masses. 

EXTREMITIES:  No edema.  No deformities.  Positive for bilateral foot dressing.



MEDICATIONS:  Medications of March 5, 2019, was reviewed.



LABORATORY DATA:  Laboratories of March 5, 2019, white count 5.9, hemoglobin 9.5,

sodium 136, potassium 5, chloride 102, carbon dioxide 21, BUN 56, creatinine 5.13,

glucose 307, calcium 8.4. 



ASSESSMENT AND PLAN:  

1. End-stage renal disease, stable.  We will continue current hemodialysis regimen

of Tuesday, Thursday, and Saturday.  Fluid removal only as tolerated by the patient. 

2. Bilateral foot infection, currently on IV antibiotics.  Consultation with Dr. Natarajan has been made.  He recommended a Doppler of the lower extremities, which was

done on March 4, 2019. 

3. Anemia, continuing weekly Epogen.





Job ID:  [id number]

## 2019-03-05 NOTE — CON
DATE OF CONSULTATION:  



HISTORY OF PRESENT ILLNESS:  This is an 83-year-old gentleman, who evidently

admitted for feeling poorly related to an ear infection.  He was told by Dr. Koehler

that if his symptoms worsen, he should go to the emergency room.  They were

admitted; however, there were no beds and they spent the first 24 to 36 hours in the

emergency room.  He has had longstanding ulcerations on his feet, which have been

draining some greenish exudate leading to an avascular ultrasound, which was

interpreted as severe bilateral femoral artery disease with monophasic signals. 



PAST MEDICAL HISTORY:  The patient's past medical history is significant for

end-stage renal disease on hemodialysis perhaps for a year, so based on the

patient's story.  History of diminished ejection fraction with coronary artery

disease resulting in coronary stenting when he was felt to be a nonsurgical patient.

 He has had diabetes for 50 years.  Ankylosing spondylitis for many years, which is

ultimately resulted in the nonuse of his lower extremities.  He has been bedridden

or wheelchair bound for the past year and a half, requiring a full-time assistance

at home.  He also has hypertension, atrial fibrillation, and dyslipidemia. 



His wound care for his legs has been predominantly through Dr. Adorno in the Wound

Care Service. 



PHYSICAL EXAMINATION:

GENERAL:  He is an elderly gentleman, currently on hemodialysis unit undergoing

dialysis.  He is in no distress and conversant with me. 

CHEST:  Physical examination was really limited to the lower extremities with the

exception that he does have a hemodialysis catheter in his right upper chest. 

EXTREMITIES:  His lower extremities he has palpable femoral pulses bilaterally as

well as palpable popliteal pulses and dorsalis pedis pulses are present at the

ankle.  There is a Doppler signals in his dorsalis pedis are good bilaterally.  He

has a dressing on his left foot and as well as his right lateral foot.  It appears

that he has at least to a good extent were looking at pressure ulcerations in his

lower extremities.  He is able to lift his left thigh off the bed slightly but

otherwise has no motor function that I can assess in his legs.  His feet have skin

pigmentation brownish could be consistent with chronic venous 

insufficiency.  He obviously has changes in his feet and toes consistent with his

severe neuropathy and issues. 



PLAN:  At this time, his vascular examination is surprisingly good and is unrelated

to his nonhealing ulcerations and no further intervention is necessary in this

regard. 







Job ID:  [id number]

## 2019-03-06 PROCEDURE — B518ZZZ FLUOROSCOPY OF SUPERIOR VENA CAVA: ICD-10-PCS | Performed by: RADIOLOGY

## 2019-03-06 PROCEDURE — B51WYZZ FLUOROSCOPY OF DIALYSIS SHUNT/FISTULA USING OTHER CONTRAST: ICD-10-PCS | Performed by: RADIOLOGY

## 2019-03-06 PROCEDURE — B51NZZZ FLUOROSCOPY OF LEFT UPPER EXTREMITY VEINS: ICD-10-PCS | Performed by: RADIOLOGY

## 2019-03-06 RX ADMIN — Medication SCH ML: at 20:36

## 2019-03-06 RX ADMIN — INSULIN LISPRO PRN UNIT: 100 INJECTION, SOLUTION INTRAVENOUS; SUBCUTANEOUS at 13:54

## 2019-03-06 RX ADMIN — HYDROCODONE BITARTRATE AND ACETAMINOPHEN PRN TAB: 5; 325 TABLET ORAL at 00:54

## 2019-03-06 RX ADMIN — PIPERACILLIN SODIUM, TAZOBACTAM SODIUM SCH MLS: 2; .25 INJECTION, POWDER, LYOPHILIZED, FOR SOLUTION INTRAVENOUS at 00:53

## 2019-03-06 RX ADMIN — ALUMINUM ZIRCONIUM TRICHLOROHYDREX GLY SCH EACH: 0.2 STICK TOPICAL at 20:35

## 2019-03-06 RX ADMIN — INSULIN LISPRO PRN UNIT: 100 INJECTION, SOLUTION INTRAVENOUS; SUBCUTANEOUS at 17:45

## 2019-03-06 RX ADMIN — ALUMINUM ZIRCONIUM TRICHLOROHYDREX GLY SCH EACH: 0.2 STICK TOPICAL at 09:09

## 2019-03-06 RX ADMIN — ALUMINUM ZIRCONIUM TRICHLOROHYDREX GLY SCH EACH: 0.2 STICK TOPICAL at 15:25

## 2019-03-06 NOTE — PDOC.PN
- Subjective


Encounter Start Date: 03/06/19


Encounter Start Time: 10:30


Subjective: no complaints, is watching tv





- Objective


Resuscitation Status - Order Detail:





03/03/19 12:52


Resuscitation Status Routine 


   Resuscitation Status: FULL: Full Resuscitation








MAR Reviewed: Yes


Vital Signs & Weight: 


 Vital Signs (12 hours)











  Temp Pulse Resp BP BP Pulse Ox


 


 03/06/19 09:07   78    


 


 03/06/19 08:00  98.7 F  97  17  140/62   97


 


 03/06/19 04:00  98.1 F  78  20   163/70 H  97








 Weight











Admit Weight                   292 lb 15.909 oz


 


Weight                         290 lb 9.6 oz














I&O: 


 











 03/05/19 03/06/19 03/07/19





 06:59 06:59 06:59


 


Intake Total 100 720 


 


Balance 100 720 











Result Diagrams: 


 03/05/19 05:43





 03/05/19 05:43


Additional Labs: 


 Accuchecks











  03/06/19 03/05/19 03/05/19





  05:55 20:38 16:53


 


POC Glucose  183 H  263 H  232 H














  03/05/19 03/05/19





  13:27 10:41


 


POC Glucose  153 H  196 H














Phys Exam





- Physical Examination


HEENT: PERRLA, moist MMs


Neck: no JVD, supple


Respiratory: no wheezing, no rales


Cardiovascular: RRR, no significant murmur


Gastrointestinal: soft, non-tender, positive bowel sounds


Musculoskeletal: pulses present


multiple ulcers both LE


Neurological: non-focal


paraparesis


Psychiatric: normal affect, A&O x 3





Dx/Plan


(1) Ulcers of both lower extremities


Code(s): L97.919 - NON-PRS CHRONIC ULC UNSP PRT OF R LOW LEG W UNSP SEVERITY; 

L97.929 - NON-PRS CHRONIC ULC UNSP PRT OF L LOW LEG W UNSP SEVERITY   Status: 

Acute   Comment: multiple b/l, likely pressure ulcers   





(2) ESRD (end stage renal disease) on dialysis


Code(s): N18.6 - END STAGE RENAL DISEASE; Z99.2 - DEPENDENCE ON RENAL DIALYSIS 

  Status: Chronic   





(3) CAD (coronary artery disease)


Code(s): I25.10 - ATHSCL HEART DISEASE OF NATIVE CORONARY ARTERY W/O ANG PCTRS 

  Status: Chronic   


Qualifiers: 


   Coronary Disease-Associated Artery/Lesion type: native artery   Native vs. 

transplanted heart: native heart   Associated angina: without angina   

Qualified Code(s): I25.10 - Atherosclerotic heart disease of native coronary 

artery without angina pectoris   





(4) Chronic a-fib


Code(s): I48.2 - CHRONIC ATRIAL FIBRILLATION   Status: Chronic   





(5) Chronic anemia


Code(s): D64.9 - ANEMIA, UNSPECIFIED   Status: Chronic   





(6) Chronic diastolic heart failure


Code(s): I50.32 - CHRONIC DIASTOLIC (CONGESTIVE) HEART FAILURE   Status: 

Chronic   





(7) DM type 2 (diabetes mellitus, type 2)


Status: Chronic   


Qualifiers: 


   Diabetes mellitus long term insulin use: with long term use   Diabetes 

mellitus complication status: with neurologic complications   Diabetes mellitus 

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 

diabetes mellitus with diabetic polyneuropathy; Z79.4 - Long term (current) use 

of insulin   





(8) GERD (gastroesophageal reflux disease)


Code(s): K21.9 - GASTRO-ESOPHAGEAL REFLUX DISEASE WITHOUT ESOPHAGITIS   Status: 

Chronic   


Qualifiers: 


   Esophagitis presence: esophagitis presence not specified   Qualified Code(s)

: K21.9 - Gastro-esophageal reflux disease without esophagitis   


Comment: stable   





(9) HTN (hypertension)


Code(s): I10 - ESSENTIAL (PRIMARY) HYPERTENSION   Status: Chronic   


Qualifiers: 


   Hypertension type: essential hypertension   Qualified Code(s): I10 - 

Essential (primary) hypertension   





(10) GARCÍA (obstructive sleep apnea)


Code(s): G47.33 - OBSTRUCTIVE SLEEP APNEA (ADULT) (PEDIATRIC)   Status: Chronic

   Comment: CPAP qhs and with naps   





- Plan


is off antibiotics


-: vascular supply is adequate per CTS with dopplerable signals


-: is having fistulogram per nephr adv


-: may dc home if ok with 


-: needs wound care referal, HH with wound care as well





* .








Review of Systems





- Medications/Allergies


Allergies/Adverse Reactions: 


 Allergies











Allergy/AdvReac Type Severity Reaction Status Date / Time


 


diazepam [From Valium] Allergy   Verified 08/22/18 04:35


 


Iodinated Contrast- Oral and Allergy   Verified 08/22/18 04:35





IV Dye     





[Iodinated Contrast Media -     





IV Dye]     


 


nitrofurantoin Allergy   Verified 08/22/18 04:35





[From Macrobid]     


 


sodium hypochlorite solution Allergy   Verified 08/22/18 04:35





[sodium hypochlorite]     


 


Sulfa (Sulfonamide Allergy   Verified 08/22/18 04:35





Antibiotics)     


 


sulfamethoxazole Allergy   Verified 08/22/18 04:35





[From Bactrim]     


 


trimethoprim [From Bactrim] Allergy   Verified 08/22/18 04:35


 


sodium hyperchlorite Allergy Unknown  Uncoded 08/22/18 04:35











Medications: 


 Current Medications





Acetaminophen (Tylenol)  650 mg PO Q4H PRN


   PRN Reason: Headache/Fever/Mild Pain (1-3)


Hydrocodone Bitart/Acetaminophen (Norco 5/325)  1 tab PO Q4H PRN


   PRN Reason: Moderate Pain (4-6)


   Last Admin: 03/06/19 00:54 Dose:  1 tab


Calcium Carbonate (Tums)  1,000 mg PO Q4H PRN


   PRN Reason: Heartburn  or Indigestion


Carvedilol (Coreg)  6.25 mg PO BID [facility]


   Last Admin: 03/06/19 09:08 Dose:  6.25 mg


Dextrose/Water (Dextrose 50%)  25 gm SLOW IVP PRN PRN


   PRN Reason: Hypoglycemia


Diphenhydramine HCl (Benadryl)  50 mg IVP WILLCALL [facility]


Epoetin Franklin (Procrit)  7,500 units SC Q7D [facility]


   Last Admin: 03/04/19 11:40 Dose:  7,500 units


Famotidine (Pepcid)  20 mg SLOW IVP WILLCALL [facility]


Glucagon (Glucagon)  1 mg IM PRN PRN


   PRN Reason: Hypoglycemia


Hydralazine HCl (Apresoline)  10 mg SLOW IVP Q4H PRN


   PRN Reason: SBP > 180 and HR < 70


   Last Admin: 03/04/19 16:22 Dose:  10 mg


Hydralazine HCl (Apresoline)  37.5 mg PO TID [facility]


   Last Admin: 03/06/19 09:07 Dose:  37.5 mg


Hydrocortisone Sodium Succinate (Solu-Cortef)  100 mg IVP WILLCALL [facility]


Dextrose/Water (D5w)  1,000 mls @ 0 mls/hr IV .Q0M PRN


   PRN Reason: Hypoglycemia


Insulin Human Lispro (Humalog)  0 units SC .AGGRESSIVE SLIDING  PRN


   PRN Reason: Aggressive Correctional Scale


   Last Admin: 03/05/19 20:44 Dose:  9 unit


Isosorbide Dinitrate (Isordil)  20 mg PO TID [facility]


   Last Admin: 03/06/19 09:08 Dose:  20 mg


Labetalol HCl (Normodyne)  20 mg SLOW IVP Q4H PRN


   PRN Reason: SBP > 180 and HR >/= 70


Ciprodex Patient's (Home Medication)  0 each EA EAR TID [facility]


   Last Admin: 03/06/19 09:09 Dose:  1 each


Sodium Chloride (Flush - Normal Saline)  10 ml IVF Q12HR [facility]


Sodium Chloride (Flush - Normal Saline)  10 ml IVF PRN PRN


   PRN Reason: Saline Flush


Terazosin HCl (Hytrin)  5 mg PO DAILY [facility]


   Last Admin: 03/06/19 09:08 Dose:  5 mg

## 2019-03-06 NOTE — PRG
DATE OF SERVICE:  03/06/2019



SUBJECTIVE:  Mr. Snider is 83-year-old white male with ESRD, currently on

maintenance hemodialysis.  He underwent dialysis yesterday without any difficulty.

He is doing well.  We are following up.  The Renal Service following up his

maintenance hemodialysis at the present time.  No other complaints today except for

his chronic decreased hearing.  He was also seen by Surgery regarding possible

peripheral vascular disease.  We plan simple observation with him.  There is no

surgical intervention needed at the present time. 



The patient's AV fistula will be looked at.  It has not matured.  I have ordered AV

fistulogram.  Unfortunately, he is allergic to iodine.  For that reason, he will be

premedicated prior to the said procedure.  No chest pain or shortness of breath. 



OBJECTIVE:  VITAL SIGNS:  Blood pressure 163/70, heart rate 78, respiratory rate 20,

temperature 98.1, pulse ox 97%. 

GENERAL:  Awake, comfortable, not in distress, obese. 

SKIN:  Adequate turgor. 

HEENT:  He has a slightly pale conjunctivae.  Anicteric sclerae. 

NECK:  No neck mass.  No carotid bruits.  No JVD. 

CHEST:  No deformities. 

LUNGS:  Clear breath sounds.  No wheezing.  No crackles. 

HEART:  Normal sinus rhythm.  No murmur.  No gallops.  No rubs. 

ABDOMEN:  Globular, soft, nontender, no masses. EXTREMITIES:  Positive for edema,

decreased motor strength in lower extremities. 



MEDICATIONS:  Of March 6, 2019, reviewed.



LABORATORIES:  March 5, 2019, white count 5.9, hemoglobin 9.5. March 6, 2019,

glucose 183. 

March 5, 2019, BUN 56, creatinine 5.13, calcium 8.4.



ASSESSMENT AND PLAN:  

1. End-stage renal disease, stable.  Continue current Tuesday, Thursday, and

Saturday hemodialysis regimen, fluid removal only as tolerated. 

2. Non-maturing AV fistula, obtain AV fistulogram.  We will follow the protocol for

iodine allergy.  We will get in touch with Radiology. 

3. Peripheral vascular disease-Surgery has evaluated the patient.  No surgical

intervention is needed at the present time with the patient. 

4. Anemia, continuing weekly Epogen.

5. Foot infection.  The patient is currently on IV antibiotics.







Job ID:  [id number]

## 2019-03-06 NOTE — SPC
LEFT UPPER EXTREMITY ARTERIAL VENOUS DIALYSIS FISTULOGRAM AND VENOGRAM:

 

Date: 3-6-19

 

History: Non-maturing left upper extremity arterial venous dialysis fistula in a patient with endstag
e renal disease. 

 

Fluoroscopy: Total fluoroscopy time is 1.2 minutes with total dose of 48,324 mGy*cm^2. 

 

Technique: 

The procedure including risks and complications were explained to the patient and informed consent wa
s obtained. Patient was placed on the angiography table in the supine position. The left upper extrem
ity was meticulously prepped and draped in the usual sterile fashion. Limited sonographic evaluation 
of the left upper extremity arterial venous dialysis fistula was performed. Ultrasound demonstrated v
enous outflow via the antecubital vein via the cephalic and basilic veins. The most proximal outflow 
at the level of the elbow was localized and the area meticulously prepped and draped in the usual monica
rile fashion. Utilizing concurrent real-time ultrasound guidance the left cephalic vein was accessed 
utilizing micropuncture technique and a 4 [place] introducer sheath was placed. 

 

A fistulogram and venogram were performed demonstrating flow within the cephalic vein. Manual evaristo
gabriela was applied to the cephalic vein outflow and then the basilic vein outflow was then refluxed. Bl
ood pressure cuff was inflated to above the level of the systolic blood pressure and contrast was inj
ected to reflux the arterial venous and anastomosis which demonstrated patency. 

 

Given the inability to adequately flux the basilic vein outflow, the left upper extremity basilic vei
n was accessed directed in the venous outflow direction utilizing micropuncture technique. A venogram
 to the SVC were then performed. 

 

The catheters were removed and hemostatis was achieved with direct pressure. Dry sterile dressings we
re placed. The patient tolerated the procedure well and without immediate complication. 

 

FINDINGS: 

Left upper extremity arterial dialysis fistula is noted with outflow via the basilic and cephalic vei
ns. However, there is a prominent collateral at the level of the outflow extending from the cephalic 
vein with severe long segment narrowing involving the more distal aspect of the cephalic vein outflow
 with collateral vessels present. The basilic vein outflow is widely patent to the SVC. A right upper
 extremity hemodialysis catheter is noted in place and the more distal aspect of the SVC is not visua
lized due to overlying metallic hardware and venous catheter in place. However, there are no collater
al vessels seen in the region and there is brisk washout from the fistula. There arterial venous anas
tomosis is patent. 

 

IMPRESSION: 

Left upper extremity arterial venous dialysis fistula with cephalic and basilic outflows. The basilic
 vein outflow is widely patent to the SVC. The distal aspect of the cephalic vein outflow at the leve
l of the shoulder demonstrates long segment area of narrowing with collateral vessels present. No foc
al stenosis is seen along the basilic vein outflow.

 

POS: NATALIE

## 2019-03-07 VITALS — TEMPERATURE: 98.5 F

## 2019-03-07 VITALS — DIASTOLIC BLOOD PRESSURE: 73 MMHG | SYSTOLIC BLOOD PRESSURE: 129 MMHG

## 2019-03-07 PROCEDURE — 5A1D70Z PERFORMANCE OF URINARY FILTRATION, INTERMITTENT, LESS THAN 6 HOURS PER DAY: ICD-10-PCS | Performed by: INTERNAL MEDICINE

## 2019-03-07 RX ADMIN — Medication SCH ML: at 12:29

## 2019-03-07 RX ADMIN — ALUMINUM ZIRCONIUM TRICHLOROHYDREX GLY SCH EACH: 0.2 STICK TOPICAL at 15:36

## 2019-03-07 RX ADMIN — INSULIN LISPRO PRN UNIT: 100 INJECTION, SOLUTION INTRAVENOUS; SUBCUTANEOUS at 12:28

## 2019-03-07 RX ADMIN — HYDROCODONE BITARTRATE AND ACETAMINOPHEN PRN TAB: 5; 325 TABLET ORAL at 14:01

## 2019-03-07 RX ADMIN — ALUMINUM ZIRCONIUM TRICHLOROHYDREX GLY SCH EACH: 0.2 STICK TOPICAL at 12:29

## 2019-03-07 NOTE — PRG
DATE OF SERVICE:  03/07/2019



SUBJECTIVE:  Mr. Snider is an 83-year-old white male with ESRD, on maintenance

hemodialysis.  He is undergoing hemodialysis.  I am at the bedside supervising his

dialysis.  We are still using his catheter.  An AV fistulogram was done yesterday

and it showed the left upper extremity arteriovenous dialysis fistula with cephalic

and the basilic outflows.  However, the distal aspect of the cephalic vein for the

level of the shoulder demonstrate long segment area of narrowing with collateral

vessels.  The patient's AV fistula was evaluated and was felt that it was not ready

for use. 



No other complaints today.



OBJECTIVE:  VITAL SIGNS:  Blood pressure is 141/63, heart rate 77, respiratory rate

20, temperature 97.9, and pulse ox 95%. 

GENERAL:  Sleepy, but arousable, comfortable, not in distress. 

SKIN:  Adequate turgor. 

HEENT:  Pinkish conjunctivae.  Anicteric sclerae. 

NECK:  No neck mass.  No carotid bruits.  No JVD. 

CHEST:  No deformities. 

LUNGS:  Clear breath sounds.  No wheezing.  No crackles. 

HEART:  Normal sinus rhythm.  No murmurs.  No gallops.  No rubs. 

ABDOMEN:  Globular, soft, and nontender.  No masses. 

EXTREMITIES:  No edema.



MEDICATIONS:  Medications of March 7, 2019, reviewed.



LABORATORY DATA:  Laboratories of March 5, 2019; white count 5.9, hemoglobin 9.5.

On March 7, 2019, glucose 236. 



On March 5, 2019; potassium 5, BUN 56, and creatinine 5.13.



ASSESSMENT AND PLAN:  

1. End-stage renal disease, stable, continuing current hemodialysis regimen.  Fluid

removal as tolerated. 

2. Anemia.  Continue weekly Epogen.

3. Foot infection - resolved.  The patient's surgeon has evaluated him for possible

revascularization, but there is no evidence for him to undergo revascularization.

Blood vessels are adequate at the present time. 

4. Immature arteriovenous fistula.  We may need to refer back the patient to surgery

in the future. 







Job ID:  [id number]

## 2019-03-07 NOTE — PDOC.PN
- Subjective


Encounter Start Date: 03/07/19


Encounter Start Time: 10:00


Subjective: is getting HD, no sob





- Objective


Resuscitation Status - Order Detail:





03/03/19 12:52


Resuscitation Status Routine 


   Resuscitation Status: FULL: Full Resuscitation








MAR Reviewed: Yes


Vital Signs & Weight: 


 Vital Signs (12 hours)











  Temp Pulse Resp BP Pulse Ox


 


 03/07/19 04:00  97.9 F  77  20  141/63 H  95








 Weight











Admit Weight                   292 lb 15.909 oz


 


Weight                         289 lb 6.4 oz














I&O: 


 











 03/06/19 03/07/19 03/08/19





 06:59 06:59 06:59


 


Intake Total 720 840 


 


Balance 720 840 











Result Diagrams: 


 03/05/19 05:43





 03/05/19 05:43


Additional Labs: 


 Accuchecks











  03/07/19 03/06/19 03/06/19





  05:50 20:22 16:55


 


POC Glucose  236 H  338 H  374 H














  03/06/19





  11:16


 


POC Glucose  328 H














Phys Exam





- Physical Examination


HEENT: PERRLA, moist MMs


Neck: no JVD, supple


Respiratory: no wheezing, no rales


Cardiovascular: RRR, no significant murmur


Gastrointestinal: soft, no distention, positive bowel sounds


Musculoskeletal: pulses present, edema present


Neurological: non-focal


chronic paraparesis


Psychiatric: A&O x 3





Dx/Plan


(1) Ulcers of both lower extremities


Code(s): L97.919 - NON-PRS CHRONIC ULC UNSP PRT OF R LOW LEG W UNSP SEVERITY; 

L97.929 - NON-PRS CHRONIC ULC UNSP PRT OF L LOW LEG W UNSP SEVERITY   Status: 

Acute   Comment: multiple b/l, likely pressure ulcers   





(2) ESRD (end stage renal disease) on dialysis


Code(s): N18.6 - END STAGE RENAL DISEASE; Z99.2 - DEPENDENCE ON RENAL DIALYSIS 

  Status: Chronic   





(3) CAD (coronary artery disease)


Code(s): I25.10 - ATHSCL HEART DISEASE OF NATIVE CORONARY ARTERY W/O ANG PCTRS 

  Status: Chronic   


Qualifiers: 


   Coronary Disease-Associated Artery/Lesion type: native artery   Native vs. 

transplanted heart: native heart   Associated angina: without angina   

Qualified Code(s): I25.10 - Atherosclerotic heart disease of native coronary 

artery without angina pectoris   





(4) Chronic a-fib


Code(s): I48.2 - CHRONIC ATRIAL FIBRILLATION   Status: Chronic   





(5) Chronic anemia


Code(s): D64.9 - ANEMIA, UNSPECIFIED   Status: Chronic   





(6) Chronic diastolic heart failure


Code(s): I50.32 - CHRONIC DIASTOLIC (CONGESTIVE) HEART FAILURE   Status: 

Chronic   





(7) DM type 2 (diabetes mellitus, type 2)


Status: Chronic   


Qualifiers: 


   Diabetes mellitus long term insulin use: with long term use   Diabetes 

mellitus complication status: with neurologic complications   Diabetes mellitus 

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 

diabetes mellitus with diabetic polyneuropathy; Z79.4 - Long term (current) use 

of insulin   





(8) GERD (gastroesophageal reflux disease)


Code(s): K21.9 - GASTRO-ESOPHAGEAL REFLUX DISEASE WITHOUT ESOPHAGITIS   Status: 

Chronic   


Qualifiers: 


   Esophagitis presence: esophagitis presence not specified   Qualified Code(s)

: K21.9 - Gastro-esophageal reflux disease without esophagitis   


Comment: stable   





(9) HTN (hypertension)


Code(s): I10 - ESSENTIAL (PRIMARY) HYPERTENSION   Status: Chronic   


Qualifiers: 


   Hypertension type: essential hypertension   Qualified Code(s): I10 - 

Essential (primary) hypertension   





(10) GARCÍA (obstructive sleep apnea)


Code(s): G47.33 - OBSTRUCTIVE SLEEP APNEA (ADULT) (PEDIATRIC)   Status: Chronic

   Comment: CPAP qhs and with naps   





- Plan


hemostable


-: may dc home after HD, d/w 


-: he will have his fistula checked as outpt with surgery


-: wound care referal downstairs and HH


-: continue coreg, bidil, hytrin





* .








Review of Systems





- Medications/Allergies


Allergies/Adverse Reactions: 


 Allergies











Allergy/AdvReac Type Severity Reaction Status Date / Time


 


diazepam [From Valium] Allergy   Verified 08/22/18 04:35


 


Iodinated Contrast- Oral and Allergy   Verified 08/22/18 04:35





IV Dye     





[Iodinated Contrast Media -     





IV Dye]     


 


nitrofurantoin Allergy   Verified 08/22/18 04:35





[From Macrobid]     


 


sodium hypochlorite solution Allergy   Verified 08/22/18 04:35





[sodium hypochlorite]     


 


Sulfa (Sulfonamide Allergy   Verified 08/22/18 04:35





Antibiotics)     


 


sulfamethoxazole Allergy   Verified 08/22/18 04:35





[From Bactrim]     


 


trimethoprim [From Bactrim] Allergy   Verified 08/22/18 04:35


 


sodium hyperchlorite Allergy Unknown  Uncoded 08/22/18 04:35











Medications: 


 Current Medications





Acetaminophen (Tylenol)  650 mg PO Q4H PRN


   PRN Reason: Headache/Fever/Mild Pain (1-3)


   Last Admin: 03/07/19 09:05 Dose:  650 mg


Hydrocodone Bitart/Acetaminophen (Norco 5/325)  1 tab PO Q4H PRN


   PRN Reason: Moderate Pain (4-6)


   Last Admin: 03/06/19 00:54 Dose:  1 tab


Atorvastatin Calcium (Lipitor)  80 mg PO HS [facility]


Calcium Carbonate (Tums)  1,000 mg PO Q4H PRN


   PRN Reason: Heartburn  or Indigestion


Carvedilol (Coreg)  6.25 mg PO BID [facility]


   Last Admin: 03/06/19 20:35 Dose:  6.25 mg


Dextrose/Water (Dextrose 50%)  25 gm SLOW IVP PRN PRN


   PRN Reason: Hypoglycemia


Diphenhydramine HCl (Benadryl)  [facility]


   Last Admin: 03/06/19 11:07 Dose:  50 mg


Epoetin [place] (Procrit)  7,500 units SC Q7D [facility]


   Last Admin: 03/04/19 11:40 Dose:  7,500 units


Famotidine (Pepcid)  20 mg SLOW IVP WILLCALL [facility]


   Last Admin: 03/06/19 11:07 Dose:  20 mg


Fluoxetine HCl (Prozac)  40 mg PO HS [facility]


Glucagon (Glucagon)  1 mg IM PRN PRN


   PRN Reason: Hypoglycemia


Hydralazine HCl (Apresoline)  10 mg SLOW IVP Q4H PRN


   PRN Reason: SBP > 180 and HR < 70


   Last Admin: 03/04/19 16:22 Dose:  10 mg


Hydralazine HCl (Apresoline)  37.5 mg PO TID [facility]


   Last Admin: 03/06/19 20:35 Dose:  37.5 mg


Hydrocortisone Sodium Succinate (Solu-Cortef)  100 mg IVP WILLCALL [facility]


   Last Admin: 03/06/19 11:07 Dose:  100 mg


Dextrose/Water (D5w)  1,000 mls @ 0 mls/hr IV .Q0M PRN


   PRN Reason: Hypoglycemia


Insulin Glargine 20 units/ (Miscellaneous Medication)  0.2 mls @ 0 mls/hr SC 

QAM [facility]


Insulin Human Lispro (Humalog)  0 units SC .AGGRESSIVE SLIDING  PRN


   PRN Reason: Aggressive Correctional Scale


   Last Admin: 03/06/19 17:45 Dose:  13 unit


Isosorbide Dinitrate (Isordil)  20 mg PO TID [facility]


   Last Admin: 03/06/19 20:35 Dose:  20 mg


Labetalol HCl (Normodyne)  20 mg SLOW IVP Q4H PRN


   PRN Reason: SBP > 180 and HR >/= 70


Losartan Potassium (Cozaar)  50 mg PO DAILY [facility]


Minocycline HCl (Minocycline Hcl)  100 mg PO BID [facility]


Ciprodex Patient's (Home Medication)  0 each EA EAR TID [facility]


   Last Admin: 03/06/19 20:35 Dose:  1 each


Ubidecarenone [Co Q- (10] 10 Mg)  0 each PO DAILY [facility]


Sodium Chloride (Flush - Normal Saline)  10 ml IVF Q12HR [facility]


   Last Admin: 03/06/19 20:36 Dose:  10 ml


Sodium Chloride (Flush - Normal Saline)  10 ml IVF PRN PRN


   PRN Reason: Saline Flush


Terazosin HCl (Hytrin)  5 mg PO DAILY [facility]


   Last Admin: 03/06/19 09:08 Dose:  5 mg

## 2019-03-08 NOTE — DIS
DATE OF ADMISSION:  03/04/2019



DATE OF DISCHARGE:  03/07/2019



DISCHARGE DISPOSITION:  To home.



PRIMARY DISCHARGE DIAGNOSIS:  Chronic lower extremity ulcerations due to

stasis/pressure ulcers. 



SECONDARY DISCHARGE DIAGNOSES:  End-stage renal disease, on hemodialysis, coronary

artery disease, chronic atrial fibrillation, chronic anemia, chronic diastolic heart

failure, diabetes mellitus type 2, GERD, hypertension, obstructive sleep apnea. 



PROCEDURES DONE DURING HOSPITALIZATION:  Right foot four-view x-ray showed extensive

soft tissue swelling.  No acute fractures were seen.  Extensive periosteal changes

were seen at the base of the 3rd, 4th and 5th metatarsals, which appear to be

chronic.  Three-view left foot x-ray done showed possible base of 5th left

metatarsal fracture.  Lower extremity ultrasound arterial Doppler done showed

moderate atherosclerotic disease with flow to the feet bilaterally.  There was

abnormal elevation and peak systolic velocity to distal femoral artery on the left

suggesting possible stenosis.  Av shuntogram done on 03/06/2019 showed left upper

extremity arterial venous dialysis fistula with cephalic and basilic outflows.  The

basilic outflow is widely patent to the SVC.  The distal aspect of the cephalic vein

outflow at the level of the shoulder demonstrates long segment area of narrowing

with collateral was present.  No focal stenosis was seen along the basilic vein

outflow.  Blood cultures x2, no growth.  H and H 10 and 29, platelet count 184, MCV

is 103.  Discharge BUN and creatinine 56 and 5.1.  Albumin is 3.3. 



INPATIENT CONSULT:  Dr. Arriaga for Vascular Surgery; Dr. Dye for Nephrology; Dr. Natarajan for Infectious Disease. 



DISCHARGE MEDICATIONS:  

1. Lipitor 80 mg p.o. at bedtime.

2. Ciprofloxacin eardrops three times daily.

3. Fluoxetine 40 mg p.o. at bedtime.

4. Iron 65 mg twice daily.

5. Losartan 50 mg daily.

6. Minocycline 100 mg p.o. twice daily for recurrent cellulitis prophylaxis.

7. Ranitidine 300 mg p.o. daily.

8. Terazosin 5 mg p.o. daily.

9. CoQ10 10 mg p.o. daily.

10. Eliquis 2.5 mg twice daily for atrial fibrillation.

11. Coreg 6.25 mg twice daily.

12. Gabapentin 300 mg p.o. twice daily.

13. BiDil 20/37.5 mg p.o. three times daily.



ALLERGIES:  ALLERGIC TO IODINE, DIAZEPAM, MACROBID, SODIUM HYPOCHLORITE SOLUTION,

SULFA. 



DISCHARGE PLAN:  The patient to follow up with primary care physician in one week.



BRIEF COURSE DURING HOSPITALIZATION:  The patient initially was brought to emergency

room for complaints of ear pain and lower extremity multiple ulcerations.  He was

initially placed on IV antibiotics and admitted to telemetry.  He was diagnosed with

otitis externa as an outpatient by Dr. Koehler and he was on ciprofloxacin eardrops.

The patient was evaluated by Dr. Natarajan and was found to have had decubitus

ulcerations in lower extremities due to paraparesis.  He has also had multiple

imaging studies and arterial Doppler as well.  Dr. Arriaga, Vascular Surgery,

evaluated the patient and the patient had good Doppler signals in both lower

extremities.  The patient has an AV fistula which is still not yet matured.  He had

a fistulogram done to check patency and maturation of it.  In view of iodine

allergy, the patient was given steroids and Benadryl, which have led to increase in

his blood sugar levels.  This should slowly come down by itself in the next 24-36

hours.  All his antibiotics have been discontinued at the time of discharge.  He is

on minocycline for recurrent cellulitis prevention.  The patient has [facility] and wound care will be provided at his home.  He is otherwise

hemodynamically stable and will be shortly discharged home.  Please see a

face-to-face documentation for the day of discharge on [employer]. 







Job ID:  [id number]

## 2019-03-11 NOTE — PQF
Martín Snider VINAYA KUMAR MD

[id number]                                                             [id number]

[id number]                             

                                   

CLINICAL DOCUMENTATION CLARIFICATION FORM:  POST DISCHARGE









DATE:    3/11/2019                                               ATTN:  Dr. Gottlieb



Please exercise your independent, professional judgment in responding to the 
clarification form. 

Clinical indicators are provided on the bottom of this form for your review



Please check appropriate box(s):





[  ] Pressure Ulcer: (Stage I: Erythema; Stage II: Partial thickness; Stage III
: Full thickness; Stage IV: Necrosis to muscle/bone)

[  ] Location: ___________________________________POA: [  ] Yes [  ] No[  ] 
Unable to determine

Stage (I to IV): _______ (Left_____   Right_____ Bilateral_____ N/A_____)



[  ] Location: ___________________________________POA: [  ] Yes [  ] No[  ] 
Unable to determine

Stage (I to IV): _______ (Left_____   Right_____ Bilateral_____ N/A_____)



[  ] Location: ___________________________________POA: [  ] Yes [  ] No[  ] 
Unable to determine

Stage (I to IV): _______ (Left_____   Right_____ Bilateral_____ N/A_____)



[  ] Gangrene present    [  ] Yes     [  ] ischemic gangrene     [  ] gas 
gangrene    [  ] No 

[  ] No pressure ulcer diagnosis

[  ] Deep tissue injury

[ x ] Other diagnosis (please specify): agree with wound care team's assesment.

[  ] Unable to determine



In addition, please specify:

Present on Admission (POA):  [  ] Yes             [  ] No             [  ] 
Unable to determine



For continuity of documentation, please document condition throughout progress 
notes and discharge summary.  Thank You.



CLINICAL INDICATORS - SIGNS / SYMPTOMS / LABS

Per DS:  Chronic lower extremity ulcerations due to stasis/pressure ulcers.  
The patient was evaluation by Dr. Natarajan and was found to have had decubitus 
ulcerations in the lower extremities due to paraparesis.  

Per H&P:  He has multiple open wounds on the left shin and the heel of the left 
foot and also on the right foot.  Both of these have dark eschar base.  

Per consult Dr. Natarajan:  Chronic wounds in the lower extremities associated with 
the combination of pressure injury plus arterial vascular insufficiency.  

Per Hospitalist PN's:  Ulcers of both lower extremities.  Likely pressure 
ulcers.  



Pre-ulcer skin changes limited to persistent focal edema (Stage 1)

Abrasion, blister, partial thickness skin loss involving epidermis and/or 
dermis (Stage 2)

Full thickness skin loss involving damage or necrosis of SQ tissue. (Stage 3)

Necrosis of soft tissue through to underlying muscle, tendon, or bone. (Stage 4)

Purple or maroon discolored skin or blood filled blister



RISK FACTORS:

Ankylosing spondylitis with paraparesis. Wheelchair bound.  

ESRD with diabetes/hypertension.

Obesity.  

Immobility.  



TREATMENTS:

IV Zosyn.  IV Vancomycin.  

Wound care consult

Specialty mattress--Bariatric Mattress.  [employer] Mattress.  

















(This form is maintained as a part of the permanent medical record)

2015 [employer].  All Rights Reserved

[email or web address]    [phone]

                                                              



MTDD

## 2019-03-21 ENCOUNTER — HOSPITAL ENCOUNTER (OUTPATIENT)
Dept: HOSPITAL 92 - WCC | Age: 84
Discharge: HOME | End: 2019-03-21
Attending: FAMILY MEDICINE
Payer: MEDICARE

## 2019-03-21 DIAGNOSIS — I12.0: ICD-10-CM

## 2019-03-21 DIAGNOSIS — I25.10: ICD-10-CM

## 2019-03-21 DIAGNOSIS — M45.9: ICD-10-CM

## 2019-03-21 DIAGNOSIS — K21.9: ICD-10-CM

## 2019-03-21 DIAGNOSIS — I89.0: ICD-10-CM

## 2019-03-21 DIAGNOSIS — E11.22: ICD-10-CM

## 2019-03-21 DIAGNOSIS — N18.6: ICD-10-CM

## 2019-03-21 DIAGNOSIS — E11.621: Primary | ICD-10-CM

## 2019-03-21 DIAGNOSIS — G47.33: ICD-10-CM

## 2019-03-21 DIAGNOSIS — Z86.79: ICD-10-CM

## 2019-03-21 DIAGNOSIS — L97.519: ICD-10-CM

## 2019-03-21 PROCEDURE — 97602 WOUND(S) CARE NON-SELECTIVE: CPT

## 2019-03-21 PROCEDURE — G0463 HOSPITAL OUTPT CLINIC VISIT: HCPCS

## 2019-03-21 PROCEDURE — A4218 STERILE SALINE OR WATER: HCPCS

## 2019-03-21 PROCEDURE — 99214 OFFICE O/P EST MOD 30 MIN: CPT

## 2019-03-21 NOTE — PRG
DATE OF SERVICE:  03/21/2019



HISTORY:  Mr. Martín Snider is a very pleasant 83-year-old gentleman, accompanied

by his wife, who [facility] for evaluation of an ulceration of the

right lateral foot.  The patient also has multiple ulcerations of the left lower

extremity.  The patient was recently admitted to [facility]

for worsening of his lower extremity wounds.  During the patient's hospital stay,

Mr. Snider was seen in consultation by both Infectious Diseases and Dr. Arriaga of

Cardiovascular Surgery.  The patient states that he continues to receive assistance

with dressing changes by Home Health. 



PHYSICAL EXAMINATION:

VITAL SIGNS:  Temperature 97.9, pulse 74, respirations 15, blood pressure 107/53.

Accu-Chek 137. 

EXTREMITIES:  An ulceration of the right lateral foot is present which measures

approximately 5.0 x 2.6 cm.  Multiple ulcerations of the left lower extremity are

present which measure approximately 5.5 x 4.0 cm, 2.5 x 2.1 cm, 4.0 x 2.0 cm, 0.6 x

0.6 cm, and 2.5 x 2.0 cm.  No purulent drainage is associated with any of the

wounds.  No cellulitis of the right or left lower extremity is appreciated.  No

maceration of the skin of the periwound of any of the wounds is noted. 



ASSESSMENT AND PLAN:  

1. Ulceration of right lateral foot and multiple ulcerations of left lower extremity

as described above.  Dressing changes of Xeroform gauze for all wounds will be

initiated today.  These dressing changes are to be performed 3 times per week after

cleansing and irrigation with the assistance of Home Health.  ABDs, Kerlix, and Ace

bandages will be utilized as secondary dressings.  As stated above, the patient was

seen in consultation by both Infectious Diseases and Cardiovascular Surgery during

his most recent admission to [facility].  Dr. Natarajan

recommended no further antimicrobial therapy and Dr. Arriaga stated that no further

vascular workup is indicated at the present time.  I will see Mr. Snider in

approximately 2 weeks and at this time, biopsy of one or more of the ulcerations

will be obtained. 

2. Lymphedema.

3. Coronary artery disease.

4. Hypertension.

5. Ankylosing spondylitis.

6. Obstructive sleep apnea.

7. History of paroxysmal atrial fibrillation.

8. Gastroesophageal reflux disease.

9. Diabetes mellitus.  The patient's Accu-Chek in clinic today is 137.  The patient

has been told that for optimal wound healing his blood glucoses should remain below

150. 

10. End-stage renal disease.  The patient is now receiving hemodialysis.







Job ID:  [id number]

## 2019-04-05 ENCOUNTER — HOSPITAL ENCOUNTER (OUTPATIENT)
Dept: HOSPITAL 92 - CT | Age: 84
Discharge: HOME | End: 2019-04-05
Attending: OTOLARYNGOLOGY
Payer: MEDICARE

## 2019-04-05 DIAGNOSIS — H60.393: Primary | ICD-10-CM

## 2019-04-05 DIAGNOSIS — H92.13: ICD-10-CM

## 2019-04-05 PROCEDURE — 70480 CT ORBIT/EAR/FOSSA W/O DYE: CPT

## 2019-04-05 NOTE — CT
FCT of the temporal bones:

4/5/2019



COMPARISON: None



HISTORY: Bilateral ear infection, bilateral jaw pain, right greater than left



TECHNIQUE: Axial CT imaging through the temporal bones at 0.63 mm intervals without contrast. Coronal
 reformatted imaging at 0.2 mm collimation.



FINDINGS: The imaged paranasal sinuses appear grossly unremarkable. The imaged brain parenchyma demon
strates no acute findings.



There is atherosclerotic calcification of the cavernous carotid arteries and distal vertebral arterie
s bilaterally.



Right temporal bone: The internal auditory canal, cochlea, vestibule, vestibular aqueduct, and semici
rcular canals appear unremarkable.



The course of the facial nerve appears within normal limits.



There are a few scattered opacified mastoid air cells on the right, especially inferiorly. There is s
oft tissue density/debris filling the external auditory canal on the right extending to the level of 
the tympanic membrane. There is minimal blunting of the scutum, significance uncertain. No significan
t soft tissue density is seen within Prussak's space.



The ossicles appear intact. The tympanic cavity appears well aerated. There is no evidence for osseou
s dehiscence. The vascular foramina appear grossly unremarkable.



Left temporal bone: The internal auditory canal, cochlea, vestibule, vestibular aqueduct, and semicir
cular canals appear grossly unremarkable. Course of the facial nerve appears grossly unremarkable.



There is extensive opacification of the mastoid air cells inferiorly with areas of peripheral osseous
 erosive change at the mastoid tip, especially anteriorly. Soft tissue density/debris fills the exter
nal auditory canal, extending to the level of the tympanic membrane. No significant scutal blunting. 
Minimal soft tissue density is seen with improved sac space. There is minimal soft tissue density inf
erior and posterior to the head of the malleus.



There is mild soft tissue density within the facial nerve recess and sinus tympani. There is no evide
nce for osseous dehisced since. Vascular foramina appear patent.



No acute osseous abnormality is noted. Mild bilateral temporomandibular joint degenerative change pre
sent



IMPRESSION: Nonspecific soft tissue density fills the external auditory canal, left greater than righ
t, extending to the level of the tympanic membrane bilaterally. Findings suspicious for otitis extern
a. 



There is partial opacification of the mastoid air cells bilaterally, left greater than right. Finding
s include peripheral erosion of the mastoid air cells on the left at the mastoid tip, particularly an
teriorly, suspicious for erosive mastoiditis. This is in continuity with abnormal soft tissue density
 within the external auditory canal and thus the erosion of the mastoid tip may be be associated with
 inflammatory change of the external auditory canal raising question for possible left malignant otit
is externa.



Reported By: Víctor Padilla 

Electronically Signed:  4/5/2019 1:26 PM

## 2019-04-08 ENCOUNTER — HOSPITAL ENCOUNTER (OUTPATIENT)
Dept: HOSPITAL 92 - WCC | Age: 84
Discharge: HOME | End: 2019-04-08
Attending: FAMILY MEDICINE
Payer: MEDICARE

## 2019-04-08 DIAGNOSIS — N18.6: ICD-10-CM

## 2019-04-08 DIAGNOSIS — L97.929: ICD-10-CM

## 2019-04-08 DIAGNOSIS — K21.9: ICD-10-CM

## 2019-04-08 DIAGNOSIS — M45.9: ICD-10-CM

## 2019-04-08 DIAGNOSIS — E11.22: ICD-10-CM

## 2019-04-08 DIAGNOSIS — Z99.2: ICD-10-CM

## 2019-04-08 DIAGNOSIS — I25.10: ICD-10-CM

## 2019-04-08 DIAGNOSIS — I12.0: ICD-10-CM

## 2019-04-08 DIAGNOSIS — Z86.79: ICD-10-CM

## 2019-04-08 DIAGNOSIS — I89.0: ICD-10-CM

## 2019-04-08 DIAGNOSIS — G47.33: ICD-10-CM

## 2019-04-08 DIAGNOSIS — E11.622: Primary | ICD-10-CM

## 2019-04-08 PROCEDURE — 97602 WOUND(S) CARE NON-SELECTIVE: CPT

## 2019-04-08 PROCEDURE — 11104 PUNCH BX SKIN SINGLE LESION: CPT

## 2019-04-08 PROCEDURE — 36416 COLLJ CAPILLARY BLOOD SPEC: CPT

## 2019-04-08 PROCEDURE — A4218 STERILE SALINE OR WATER: HCPCS

## 2019-04-08 NOTE — PRG
DATE OF SERVICE:  04/08/2019



HISTORY OF PRESENT ILLNESS:  Mr. Martín Snider is a very pleasant 83-year-old

gentleman accompanied by his wife, who presents to the [facility] for evaluation

of multiple ulcerations of the left lower extremity.  The patient was recently

admitted to [facility] for worsening of his lower extremity

wounds.  During the patient's hospital stay, Mr. Snider was seen in consultation

by both Infectious Diseases and Dr. Arriaga of Cardiovascular Surgery.  The patient

continues to receive assistance with dressing changes of Xeroform for his left lower

extremity wounds by Home Health. 



PHYSICAL EXAMINATION:

VITAL SIGNS:  Temperature 97.4, pulse 83, respirations 17, blood pressure 125/60,

Accu-Chek 237. 

EXTREMITIES:  Multiple ulcerations of the left lower extremity are present.  One of

the ulcerations over the left lower leg measures approximately 4.4 x 2.6 cm.  A 2%

lidocaine was injected subcutaneously to achieve local anesthesia after prepping the

skin with alcohol and Betadine.  Three punch biopsy specimens were obtained, one

from the center of the ulceration, one at the margin of the wound, and one over the

periwound.  The biopsy sites were dressed with Surgicel followed by Xeroform. 



ASSESSMENT AND PLAN:  

1. Multiple ulcerations of left lower extremity.  Three punch biopsy specimens of

one of the ulcerations were obtained today.  The specimens were obtained from the

center of the ulceration, the margin of the ulceration, and the periwound.  The

specimens were sent to Pathology in normal saline to look for findings suggestive of

calciphylaxis.  Dressing changes of Xeroform gauze for all wounds will be continued

3 times per week after cleansing and irrigation with the assistance of Home Health.

ABDs, Kerlix, and Ace bandages will be utilized as secondary dressings.  The patient

and his wife will be notified in regard to the results of the histology.  As stated

above, the patient was seen in consultation by both Infectious Diseases and

Cardiovascular Surgery during his most recent admission to [facility].  Dr. Natarajan recommended no further antimicrobial therapy and Dr. Arriaga

stated that no further vascular workup is indicated at the present time.  As stated

above, the patient and his wife will be contacted in regard to the histologic

findings. 

2. Lymphedema.

3. Coronary artery disease.

4. Hypertension.

5. Ankylosing spondylitis.

6. Obstructive sleep apnea.

7. History of paroxysmal atrial fibrillation.

8. Gastroesophageal reflux disease.

9. Diabetes mellitus.  The patient's Accu-Chek in clinic today is 237.  The patient

has been reminded that for optimal wound healing, his blood glucoses should remain

below 150. 

10. End-stage renal disease.  The patient receives hemodialysis.







Job ID:  [id number]

## 2020-02-25 NOTE — PRG
DATE OF SERVICE:  12/07/2017

 

HISTORY:  Mr. Martín Snider is a very pleasant 82-year-old gentleman, who presents to the Wound Ce
nter for evaluation of an ulceration of the right lateral foot.  The patient is again accompanied by 
his wife today.  The patient has been receiving dressing changes of Silverlon for the right lateral f
oot ulceration 3 times per week after cleansing and irrigation with the assistance of Home Health.  T
he patient also presents with a new blister of the left medial heel.  The patient has no other compla
ints today.  He denies any fever or chills.

 

PHYSICAL EXAMINATION:

VITAL SIGNS:  Temperature 97.4, pulse 59, respirations 17, and blood pressure 149/67.

EXTREMITIES:  An ulceration of the right lateral foot is present, which measures approximately 7.5 x 
2.7 cm.  The ulceration has markedly improved in its appearance since the patient's last visit.  Dry,
 stable eschar is present within the wound margins.  Granulation tissue is present at the periphery o
f the wound.  No purulent drainage is associated with the wound.  No erythema of the skin surrounding
 the wound is present.  No maceration of the skin of the periwound is noted.  Edema of the right foot
 and lower leg is present on exam today.  The blister of the left medial heel measures approximately 
4.5 x 8.5 cm.  A sample of the drainage contained within the blister was sent for aerobic and anaerob
ic cultures.  The blister was unroofed with an excisional partial-thickness debridement with the use 
of scissors.  No purulent drainage was noted within the blister.  No cellulitis of the left foot is a
ppreciated.  No maceration of the skin of the periwound is noted.  Edema of the left foot and lower l
eg is also present on exam today.

 

ASSESSMENT AND PLAN:

1.  Ulceration of right lateral foot as described above.  Dressing changes of Silverlon will be dorina
nued 3 times per week after cleansing and irrigation with the assistance of Home Health.  For the wou
nd at the left medial heel, dressing changes of Silverlon will also be performed 3 times per week aft
er cleansing and irrigation with the assistance of Home Health.  The patient has been given a prescri
ption for Keflex 500 mg #20 one p.o. b.i.d. x10 days.  Antibiotic therapy will be modified based upon
 the results of the cultures obtained today.  I will see Mr. Snider again on 01/11/2018.

2.  Lymphedema circumferences of the right lower extremity at the ankle, calf, and knee are as follow
s, 32.5 cm, 42.6 cm, and 52.2 cm.  Circumferences of the left lower extremity at the ankle, calf, and
 knee are 29.0 cm, 39.7 cm, and 49 cm.  Arrangements will continue for the initiation of inhome lymph
edema therapy.

3.  Coronary artery disease.

4.  Hypertension.

5.  Ankylosing spondylitis.

6.  Obstructive sleep apnea.

7.  History of paroxysmal atrial fibrillation

8.  Gastroesophageal reflux disease.

9.  Diabetes mellitus.  Accu-Cheks will be obtained at the time of the patient's clinic visits.  The 
patient has been reminded that for optimal wound healing, his blood glucoses should remain below 150.
no